# Patient Record
Sex: FEMALE | Race: WHITE | Employment: OTHER | URBAN - METROPOLITAN AREA
[De-identification: names, ages, dates, MRNs, and addresses within clinical notes are randomized per-mention and may not be internally consistent; named-entity substitution may affect disease eponyms.]

---

## 2019-01-02 ENCOUNTER — HOSPITAL ENCOUNTER (OUTPATIENT)
Age: 64
Setting detail: SPECIMEN
Discharge: HOME | End: 2019-01-02
Payer: COMMERCIAL

## 2019-01-04 LAB — SURGICAL PATHOLOGY REPORT: NORMAL

## 2019-01-24 ENCOUNTER — OFFICE VISIT (OUTPATIENT)
Dept: FAMILY MEDICINE CLINIC | Age: 64
End: 2019-01-24
Payer: COMMERCIAL

## 2019-01-24 VITALS
DIASTOLIC BLOOD PRESSURE: 82 MMHG | SYSTOLIC BLOOD PRESSURE: 120 MMHG | WEIGHT: 162 LBS | TEMPERATURE: 99.3 F | HEART RATE: 92 BPM | HEIGHT: 67 IN | BODY MASS INDEX: 25.43 KG/M2 | RESPIRATION RATE: 20 BRPM

## 2019-01-24 DIAGNOSIS — K58.8 OTHER IRRITABLE BOWEL SYNDROME: ICD-10-CM

## 2019-01-24 DIAGNOSIS — K21.00 GASTROESOPHAGEAL REFLUX DISEASE WITH ESOPHAGITIS: Primary | ICD-10-CM

## 2019-01-24 DIAGNOSIS — F41.9 ANXIETY: ICD-10-CM

## 2019-01-24 DIAGNOSIS — R05.3 CHRONIC COUGH: ICD-10-CM

## 2019-01-24 DIAGNOSIS — I34.1 MITRAL VALVE PROLAPSE: ICD-10-CM

## 2019-01-24 DIAGNOSIS — J45.909 UNCOMPLICATED ASTHMA, UNSPECIFIED ASTHMA SEVERITY, UNSPECIFIED WHETHER PERSISTENT: ICD-10-CM

## 2019-01-24 PROBLEM — K58.9 IRRITABLE BOWEL SYNDROME: Status: ACTIVE | Noted: 2017-05-08

## 2019-01-24 PROBLEM — K21.9 GASTROESOPHAGEAL REFLUX DISEASE: Status: ACTIVE | Noted: 2017-05-08

## 2019-01-24 LAB
EXPIRATORY TIME-POST: NORMAL SEC
EXPIRATORY TIME: NORMAL SEC
FEF 25-75% %CHNG: NORMAL
FEF 25-75% %PRED-POST: NORMAL %
FEF 25-75% %PRED-PRE: NORMAL L/SEC
FEF 25-75% PRED: NORMAL L/SEC
FEF 25-75%-POST: NORMAL L/SEC
FEF 25-75%-PRE: NORMAL L/SEC
FEV1 %PRED-POST: NORMAL %
FEV1 %PRED-PRE: NORMAL %
FEV1 PRED: NORMAL L
FEV1-POST: NORMAL L
FEV1-PRE: NORMAL L
FEV1/FVC %PRED-POST: NORMAL %
FEV1/FVC %PRED-PRE: NORMAL %
FEV1/FVC PRED: NORMAL %
FEV1/FVC-POST: NORMAL %
FEV1/FVC-PRE: NORMAL %
FVC %PRED-POST: NORMAL L
FVC %PRED-PRE: NORMAL %
FVC PRED: NORMAL L
FVC-POST: NORMAL L
FVC-PRE: NORMAL L
PEF %PRED-POST: NORMAL %
PEF %PRED-PRE: NORMAL L/SEC
PEF PRED: NORMAL L/SEC
PEF%CHNG: NORMAL
PEF-POST: NORMAL L/SEC
PEF-PRE: NORMAL L/SEC

## 2019-01-24 PROCEDURE — G8484 FLU IMMUNIZE NO ADMIN: HCPCS | Performed by: NURSE PRACTITIONER

## 2019-01-24 PROCEDURE — 3017F COLORECTAL CA SCREEN DOC REV: CPT | Performed by: NURSE PRACTITIONER

## 2019-01-24 PROCEDURE — G8419 CALC BMI OUT NRM PARAM NOF/U: HCPCS | Performed by: NURSE PRACTITIONER

## 2019-01-24 PROCEDURE — 1036F TOBACCO NON-USER: CPT | Performed by: NURSE PRACTITIONER

## 2019-01-24 PROCEDURE — 99203 OFFICE O/P NEW LOW 30 MIN: CPT | Performed by: NURSE PRACTITIONER

## 2019-01-24 PROCEDURE — G8427 DOCREV CUR MEDS BY ELIG CLIN: HCPCS | Performed by: NURSE PRACTITIONER

## 2019-01-24 RX ORDER — BENZONATATE 100 MG/1
100 CAPSULE ORAL 3 TIMES DAILY PRN
COMMUNITY
End: 2019-09-25

## 2019-01-24 RX ORDER — PANTOPRAZOLE SODIUM 40 MG/1
40 TABLET, DELAYED RELEASE ORAL NIGHTLY
Qty: 30 TABLET | Refills: 0 | Status: SHIPPED | OUTPATIENT
Start: 2019-01-24 | End: 2019-02-19 | Stop reason: SDUPTHER

## 2019-01-24 RX ORDER — ALBUTEROL SULFATE 2.5 MG/3ML
SOLUTION RESPIRATORY (INHALATION) PRN
COMMUNITY
Start: 2019-01-11 | End: 2019-01-24 | Stop reason: ALTCHOICE

## 2019-01-24 RX ORDER — ZOLPIDEM TARTRATE 5 MG/1
5 TABLET ORAL PRN
COMMUNITY
Start: 2012-07-25 | End: 2019-01-24 | Stop reason: ALTCHOICE

## 2019-01-24 RX ORDER — ACYCLOVIR 50 MG/G
OINTMENT TOPICAL DAILY
COMMUNITY
Start: 2017-05-08 | End: 2019-01-24 | Stop reason: ALTCHOICE

## 2019-01-24 RX ORDER — RANITIDINE 150 MG/1
150 TABLET ORAL 3 TIMES DAILY PRN
COMMUNITY
Start: 2018-11-02 | End: 2019-01-24 | Stop reason: ALTCHOICE

## 2019-01-24 RX ORDER — OMEPRAZOLE 20 MG/1
20 CAPSULE, DELAYED RELEASE ORAL DAILY
COMMUNITY
Start: 2018-12-26 | End: 2019-01-24 | Stop reason: ALTCHOICE

## 2019-01-24 ASSESSMENT — PATIENT HEALTH QUESTIONNAIRE - PHQ9
SUM OF ALL RESPONSES TO PHQ QUESTIONS 1-9: 1
1. LITTLE INTEREST OR PLEASURE IN DOING THINGS: 0
SUM OF ALL RESPONSES TO PHQ QUESTIONS 1-9: 1
2. FEELING DOWN, DEPRESSED OR HOPELESS: 1
SUM OF ALL RESPONSES TO PHQ9 QUESTIONS 1 & 2: 1

## 2019-01-24 ASSESSMENT — ENCOUNTER SYMPTOMS
COUGH: 1
ABDOMINAL PAIN: 1
RHINORRHEA: 0
EYE REDNESS: 0
SORE THROAT: 0
EYE ITCHING: 0
DIARRHEA: 0
SHORTNESS OF BREATH: 1
CONSTIPATION: 0
NAUSEA: 1
EYE DISCHARGE: 0

## 2019-03-22 ENCOUNTER — TELEPHONE (OUTPATIENT)
Dept: FAMILY MEDICINE CLINIC | Age: 64
End: 2019-03-22

## 2019-03-22 DIAGNOSIS — B37.0 THRUSH: Primary | ICD-10-CM

## 2019-03-22 RX ORDER — CLOTRIMAZOLE 10 MG/1
10 LOZENGE ORAL; TOPICAL
Qty: 50 TABLET | Refills: 0 | Status: SHIPPED | OUTPATIENT
Start: 2019-03-22 | End: 2019-04-01

## 2019-03-22 NOTE — TELEPHONE ENCOUNTER
Patient notified and verbalized understanding. Patient states that she did go to the [facility] clinic in [facility] to be evaluated as well. She states that they did prescribe something for her to take.

## 2019-03-22 NOTE — TELEPHONE ENCOUNTER
I can send some anti fungal to see if this relieves issues. Make sure she rinsing mouth out after steroid is taken. If issues do not resolve, let us know.  -SR

## 2019-03-22 NOTE — TELEPHONE ENCOUNTER
Patient called stating she believes she has thrush due to newly prescribed steroids. On day 4 of steroid medication prescribed by pulmonary specialist Dr. Venkat Dotson. Cannot get in with pulmonary. Patient is experiencing burning/fire in her throat, which is keeping her up at night. She has tried cough drops with no relief. Wondering if antifungal can be prescribed without being seen. No same day appointments available today. Suggested walk in clinic with worsening symptoms.

## 2019-12-11 ENCOUNTER — HOSPITAL ENCOUNTER (EMERGENCY)
Age: 64
Discharge: LEFT BEFORE DISCHARGE | End: 2019-12-11
Attending: EMERGENCY MEDICINE
Payer: COMMERCIAL

## 2019-12-14 ENCOUNTER — HOSPITAL ENCOUNTER (OUTPATIENT)
Age: 64
Setting detail: SPECIMEN
Discharge: HOME | End: 2019-12-14
Payer: COMMERCIAL

## 2019-12-14 ENCOUNTER — PATIENT MESSAGE (OUTPATIENT)
Dept: FAMILY MEDICINE CLINIC | Age: 64
End: 2019-12-14

## 2019-12-14 ENCOUNTER — OFFICE VISIT (OUTPATIENT)
Dept: FAMILY MEDICINE CLINIC | Age: 64
End: 2019-12-14
Payer: COMMERCIAL

## 2019-12-14 VITALS
BODY MASS INDEX: 24.43 KG/M2 | WEIGHT: 156 LBS | TEMPERATURE: 98.4 F | OXYGEN SATURATION: 98 % | DIASTOLIC BLOOD PRESSURE: 72 MMHG | SYSTOLIC BLOOD PRESSURE: 102 MMHG | HEART RATE: 102 BPM | RESPIRATION RATE: 18 BRPM

## 2019-12-14 DIAGNOSIS — R11.0 NAUSEA: ICD-10-CM

## 2019-12-14 DIAGNOSIS — I34.1 MITRAL VALVE PROLAPSE: ICD-10-CM

## 2019-12-14 DIAGNOSIS — F41.0 PANIC DISORDER: ICD-10-CM

## 2019-12-14 DIAGNOSIS — K58.0 IRRITABLE BOWEL SYNDROME WITH DIARRHEA: ICD-10-CM

## 2019-12-14 DIAGNOSIS — F41.9 ANXIETY: ICD-10-CM

## 2019-12-14 DIAGNOSIS — R10.11 RIGHT UPPER QUADRANT ABDOMINAL PAIN: ICD-10-CM

## 2019-12-14 DIAGNOSIS — Z79.2 PROPHYLACTIC ANTIBIOTIC: ICD-10-CM

## 2019-12-14 DIAGNOSIS — R73.01 ELEVATED FASTING BLOOD SUGAR: ICD-10-CM

## 2019-12-14 DIAGNOSIS — K21.9 GASTROESOPHAGEAL REFLUX DISEASE WITHOUT ESOPHAGITIS: ICD-10-CM

## 2019-12-14 DIAGNOSIS — F40.00 AGORAPHOBIA: ICD-10-CM

## 2019-12-14 DIAGNOSIS — F41.0 PANIC DISORDER: Primary | ICD-10-CM

## 2019-12-14 LAB
ABSOLUTE EOS #: <0.03 K/UL (ref 0–0.44)
ABSOLUTE IMMATURE GRANULOCYTE: 0.03 K/UL (ref 0–0.3)
ABSOLUTE LYMPH #: 1.35 K/UL (ref 1.1–3.7)
ABSOLUTE MONO #: 0.57 K/UL (ref 0.1–1.2)
ALBUMIN SERPL-MCNC: 4.6 G/DL (ref 3.5–5.2)
ALBUMIN/GLOBULIN RATIO: 1.4 (ref 1–2.5)
ALP BLD-CCNC: 84 U/L (ref 35–104)
ALT SERPL-CCNC: 15 U/L (ref 5–33)
AMYLASE: 75 U/L (ref 28–100)
ANION GAP SERPL CALCULATED.3IONS-SCNC: 18 MMOL/L (ref 9–17)
AST SERPL-CCNC: 23 U/L
BASOPHILS # BLD: 0 % (ref 0–2)
BASOPHILS ABSOLUTE: 0.04 K/UL (ref 0–0.2)
BILIRUB SERPL-MCNC: 0.69 MG/DL (ref 0.3–1.2)
BUN BLDV-MCNC: 16 MG/DL (ref 8–23)
BUN/CREAT BLD: ABNORMAL (ref 9–20)
CALCIUM SERPL-MCNC: 9.4 MG/DL (ref 8.6–10.4)
CHLORIDE BLD-SCNC: 99 MMOL/L (ref 98–107)
CHOLESTEROL, FASTING: 231 MG/DL
CHOLESTEROL/HDL RATIO: 3.6
CO2: 24 MMOL/L (ref 20–31)
CREAT SERPL-MCNC: 0.72 MG/DL (ref 0.5–0.9)
DIFFERENTIAL TYPE: ABNORMAL
EOSINOPHILS RELATIVE PERCENT: 0 % (ref 1–4)
GFR AFRICAN AMERICAN: >60 ML/MIN
GFR NON-AFRICAN AMERICAN: >60 ML/MIN
GFR SERPL CREATININE-BSD FRML MDRD: ABNORMAL ML/MIN/{1.73_M2}
GFR SERPL CREATININE-BSD FRML MDRD: ABNORMAL ML/MIN/{1.73_M2}
GLUCOSE FASTING: 66 MG/DL (ref 70–99)
HCT VFR BLD CALC: 46.5 % (ref 36.3–47.1)
HDLC SERPL-MCNC: 64 MG/DL
HEMOGLOBIN: 15.3 G/DL (ref 11.9–15.1)
IMMATURE GRANULOCYTES: 0 %
LDL CHOLESTEROL: 152 MG/DL (ref 0–130)
LIPASE: 19 U/L (ref 13–60)
LYMPHOCYTES # BLD: 12 % (ref 24–43)
MCH RBC QN AUTO: 30.9 PG (ref 25.2–33.5)
MCHC RBC AUTO-ENTMCNC: 32.9 G/DL (ref 28.4–34.8)
MCV RBC AUTO: 93.9 FL (ref 82.6–102.9)
MONOCYTES # BLD: 5 % (ref 3–12)
NRBC AUTOMATED: 0 PER 100 WBC
PDW BLD-RTO: 12.1 % (ref 11.8–14.4)
PLATELET # BLD: 322 K/UL (ref 138–453)
PLATELET ESTIMATE: ABNORMAL
PMV BLD AUTO: 12.5 FL (ref 8.1–13.5)
POTASSIUM SERPL-SCNC: 4.5 MMOL/L (ref 3.7–5.3)
RBC # BLD: 4.95 M/UL (ref 3.95–5.11)
RBC # BLD: ABNORMAL 10*6/UL
SEG NEUTROPHILS: 83 % (ref 36–65)
SEGMENTED NEUTROPHILS ABSOLUTE COUNT: 9.09 K/UL (ref 1.5–8.1)
SODIUM BLD-SCNC: 141 MMOL/L (ref 135–144)
TOTAL PROTEIN: 8 G/DL (ref 6.4–8.3)
TRIGLYCERIDE, FASTING: 76 MG/DL
TSH SERPL DL<=0.05 MIU/L-ACNC: 0.83 MIU/L (ref 0.3–5)
VITAMIN D 25-HYDROXY: 28.8 NG/ML (ref 30–100)
VLDLC SERPL CALC-MCNC: ABNORMAL MG/DL (ref 1–30)
WBC # BLD: 11.1 K/UL (ref 3.5–11.3)
WBC # BLD: ABNORMAL 10*3/UL

## 2019-12-14 PROCEDURE — G8420 CALC BMI NORM PARAMETERS: HCPCS | Performed by: NURSE PRACTITIONER

## 2019-12-14 PROCEDURE — 99214 OFFICE O/P EST MOD 30 MIN: CPT | Performed by: NURSE PRACTITIONER

## 2019-12-14 PROCEDURE — G8484 FLU IMMUNIZE NO ADMIN: HCPCS | Performed by: NURSE PRACTITIONER

## 2019-12-14 PROCEDURE — 1036F TOBACCO NON-USER: CPT | Performed by: NURSE PRACTITIONER

## 2019-12-14 PROCEDURE — G8427 DOCREV CUR MEDS BY ELIG CLIN: HCPCS | Performed by: NURSE PRACTITIONER

## 2019-12-14 PROCEDURE — 3017F COLORECTAL CA SCREEN DOC REV: CPT | Performed by: NURSE PRACTITIONER

## 2019-12-14 RX ORDER — FAMOTIDINE 20 MG
TABLET ORAL
Refills: 0 | COMMUNITY
Start: 2019-12-12 | End: 2020-09-28 | Stop reason: ALTCHOICE

## 2019-12-14 RX ORDER — OMEPRAZOLE 20 MG/1
20 CAPSULE, DELAYED RELEASE ORAL DAILY
COMMUNITY
Start: 2019-11-14 | End: 2020-02-20 | Stop reason: SDUPTHER

## 2019-12-14 RX ORDER — CLONAZEPAM 0.5 MG/1
.5-1 TABLET ORAL 2 TIMES DAILY PRN
Qty: 60 TABLET | Refills: 0 | Status: SHIPPED | OUTPATIENT
Start: 2019-12-14 | End: 2021-09-08 | Stop reason: SDUPTHER

## 2019-12-14 RX ORDER — AMOXICILLIN AND CLAVULANATE POTASSIUM 875; 125 MG/1; MG/1
1 TABLET, FILM COATED ORAL 2 TIMES DAILY
Qty: 6 TABLET | Refills: 3 | Status: SHIPPED | OUTPATIENT
Start: 2019-12-14 | End: 2019-12-26

## 2019-12-14 RX ORDER — FLUCONAZOLE 100 MG/1
100 TABLET ORAL DAILY
COMMUNITY
Start: 2019-11-25 | End: 2020-09-28 | Stop reason: ALTCHOICE

## 2019-12-14 ASSESSMENT — ENCOUNTER SYMPTOMS
CONSTIPATION: 0
EYE ITCHING: 0
EYE REDNESS: 0
SHORTNESS OF BREATH: 1
EYE DISCHARGE: 0
COUGH: 1
ABDOMINAL PAIN: 1
DIARRHEA: 0
RHINORRHEA: 0
SORE THROAT: 0
NAUSEA: 1

## 2019-12-15 LAB
BILIRUBIN URINE: NEGATIVE
COLOR: YELLOW
COMMENT UA: NORMAL
CULTURE: NORMAL
GLUCOSE URINE: NEGATIVE
KETONES, URINE: NEGATIVE
LEUKOCYTE ESTERASE, URINE: NEGATIVE
Lab: NORMAL
NITRITE, URINE: NEGATIVE
PH UA: 5 (ref 5–8)
PROTEIN UA: NEGATIVE
SPECIFIC GRAVITY UA: 1.02 (ref 1–1.03)
SPECIMEN DESCRIPTION: NORMAL
TURBIDITY: CLEAR
URINE HGB: NEGATIVE
UROBILINOGEN, URINE: NORMAL

## 2019-12-16 LAB
ESTIMATED AVERAGE GLUCOSE: 91 MG/DL
HBA1C MFR BLD: 4.8 % (ref 4–6)

## 2020-02-20 RX ORDER — OMEPRAZOLE 20 MG/1
20 CAPSULE, DELAYED RELEASE ORAL DAILY
Qty: 90 CAPSULE | Refills: 1 | Status: SHIPPED | OUTPATIENT
Start: 2020-02-20 | End: 2020-04-24 | Stop reason: SDUPTHER

## 2020-02-20 RX ORDER — PANTOPRAZOLE SODIUM 40 MG/1
40 TABLET, DELAYED RELEASE ORAL NIGHTLY
Qty: 30 TABLET | Refills: 0 | Status: SHIPPED | OUTPATIENT
Start: 2020-02-20 | End: 2020-09-28 | Stop reason: ALTCHOICE

## 2020-04-24 NOTE — TELEPHONE ENCOUNTER
Last visit: 1/24/19  Last Med refill: 2/20/20  Does patient have enough medication for 72 hours: Yes-patient's insurance changed and she now has to use [email or web address]. [facility] will be due for refill may 1st but they need it on file in order to process for a fill. Next Visit Date:  No future appointments.     Health Maintenance   Topic Date Due    DTaP/Tdap/Td vaccine (1 - Tdap) 03/09/1974    Cervical cancer screen  03/09/1976    Breast cancer screen  03/09/2005    Colon cancer screen colonoscopy  03/09/2005    DEXA (modify frequency per FRAX score)  03/09/2010    Pneumococcal 65+ years Vaccine (1 of 1 - PPSV23) 03/09/2020    Flu vaccine (Season Ended) 12/14/2020 (Originally 9/1/2020)    Shingles Vaccine (1 of 2) 12/14/2020 (Originally 3/9/2005)    Lipid screen  12/14/2024    Hepatitis A vaccine  Aged Out    Hepatitis B vaccine  Aged Out    Hib vaccine  Aged Out    Meningococcal (ACWY) vaccine  Aged Out    Hepatitis C screen  Discontinued    HIV screen  Discontinued       Hemoglobin A1C (%)   Date Value   12/14/2019 4.8             ( goal A1C is < 7)   No results found for: LABMICR  LDL Cholesterol (mg/dL)   Date Value   12/14/2019 152 (H)       (goal LDL is <100)   AST (U/L)   Date Value   12/14/2019 23     ALT (U/L)   Date Value   12/14/2019 15     BUN (mg/dL)   Date Value   12/14/2019 16     BP Readings from Last 3 Encounters:   12/14/19 102/72   01/24/19 120/82          (goal 120/80)    All Future Testing planned in [facility]              Patient Active Problem List:     Anxiety     Gastroesophageal reflux disease     Irritable bowel syndrome     Mitral valve prolapse     Asthma     Agoraphobia     Panic disorder     Prophylactic antibiotic

## 2020-04-27 RX ORDER — OMEPRAZOLE 20 MG/1
20 CAPSULE, DELAYED RELEASE ORAL DAILY
Qty: 90 CAPSULE | Refills: 1 | Status: SHIPPED | OUTPATIENT
Start: 2020-04-27 | End: 2020-11-15 | Stop reason: SDUPTHER

## 2020-09-28 ENCOUNTER — OFFICE VISIT (OUTPATIENT)
Dept: FAMILY MEDICINE CLINIC | Age: 65
End: 2020-09-28
Payer: MEDICARE

## 2020-09-28 VITALS
OXYGEN SATURATION: 98 % | RESPIRATION RATE: 20 BRPM | BODY MASS INDEX: 25.72 KG/M2 | SYSTOLIC BLOOD PRESSURE: 104 MMHG | DIASTOLIC BLOOD PRESSURE: 74 MMHG | WEIGHT: 164.2 LBS | TEMPERATURE: 97.9 F | HEART RATE: 84 BPM

## 2020-09-28 PROCEDURE — 4040F PNEUMOC VAC/ADMIN/RCVD: CPT | Performed by: NURSE PRACTITIONER

## 2020-09-28 PROCEDURE — 3017F COLORECTAL CA SCREEN DOC REV: CPT | Performed by: NURSE PRACTITIONER

## 2020-09-28 PROCEDURE — G8417 CALC BMI ABV UP PARAM F/U: HCPCS | Performed by: NURSE PRACTITIONER

## 2020-09-28 PROCEDURE — 99214 OFFICE O/P EST MOD 30 MIN: CPT | Performed by: NURSE PRACTITIONER

## 2020-09-28 PROCEDURE — G8400 PT W/DXA NO RESULTS DOC: HCPCS | Performed by: NURSE PRACTITIONER

## 2020-09-28 PROCEDURE — G8427 DOCREV CUR MEDS BY ELIG CLIN: HCPCS | Performed by: NURSE PRACTITIONER

## 2020-09-28 PROCEDURE — 1036F TOBACCO NON-USER: CPT | Performed by: NURSE PRACTITIONER

## 2020-09-28 PROCEDURE — 81003 URINALYSIS AUTO W/O SCOPE: CPT | Performed by: NURSE PRACTITIONER

## 2020-09-28 PROCEDURE — 1123F ACP DISCUSS/DSCN MKR DOCD: CPT | Performed by: NURSE PRACTITIONER

## 2020-09-28 PROCEDURE — 90471 IMMUNIZATION ADMIN: CPT | Performed by: NURSE PRACTITIONER

## 2020-09-28 PROCEDURE — 1090F PRES/ABSN URINE INCON ASSESS: CPT | Performed by: NURSE PRACTITIONER

## 2020-09-28 PROCEDURE — 90715 TDAP VACCINE 7 YRS/> IM: CPT | Performed by: NURSE PRACTITIONER

## 2020-09-28 RX ORDER — ZOLPIDEM TARTRATE 5 MG/1
5 TABLET ORAL PRN
COMMUNITY
End: 2021-07-16 | Stop reason: SDUPTHER

## 2020-09-28 RX ORDER — GREEN TEA/HOODIA GORDONII 315-12.5MG
1 CAPSULE ORAL DAILY
COMMUNITY

## 2020-09-28 RX ORDER — BENZONATATE 100 MG/1
CAPSULE ORAL PRN
COMMUNITY
End: 2022-07-24 | Stop reason: ALTCHOICE

## 2020-09-28 ASSESSMENT — ENCOUNTER SYMPTOMS
WHEEZING: 0
COUGH: 0
DIARRHEA: 1
ABDOMINAL PAIN: 0
SHORTNESS OF BREATH: 0
EYES NEGATIVE: 1
CONSTIPATION: 0
BACK PAIN: 0
CHEST TIGHTNESS: 0
TROUBLE SWALLOWING: 0
SINUS PRESSURE: 0
NAUSEA: 0
RHINORRHEA: 0
BLOOD IN STOOL: 0
SORE THROAT: 0

## 2020-09-28 ASSESSMENT — PATIENT HEALTH QUESTIONNAIRE - PHQ9
2. FEELING DOWN, DEPRESSED OR HOPELESS: 0
SUM OF ALL RESPONSES TO PHQ9 QUESTIONS 1 & 2: 0
1. LITTLE INTEREST OR PLEASURE IN DOING THINGS: 0
SUM OF ALL RESPONSES TO PHQ QUESTIONS 1-9: 0
SUM OF ALL RESPONSES TO PHQ QUESTIONS 1-9: 0

## 2020-09-28 ASSESSMENT — VISUAL ACUITY: OU: 1

## 2020-09-28 NOTE — PROGRESS NOTES
[address] [facility]   [address] [address]  [phone]    9/28/2020    Visit Information    Have you changed or started any medications since your last visit including any over-the-counter medicines, vitamins, or herbal medicines? yes - Med list updated   Are you having any side effects from any of your medications? -  no  Have you stopped taking any of your medications? Is so, why? -  yes - Med list updated    Have you seen any other physician or provider since your last visit? No  Have you had any other diagnostic tests since your last visit? No  Have you been seen in the emergency room and/or had an admission to a hospital since we last saw you? No  Have you had your routine dental cleaning in the past 6 months? yes -      Have you activated your [email or web address] account? If not, what are your barriers? Yes     Patient Care Team:  Verlinda Runner, APRN - CNP as PCP - General (Nurse Practitioner Family)  Verlinda Runner, APRN - CNP as PCP - [facility] Empaneled Provider      Dimitry Thomson is a 72 y.o. female who presents today for her  medical conditions/complaints as noted below. Dimitry Thomson is c/o of Anxiety; Panic Attack; Mitral Valve Prolapse; and Irritable Bowel Syndrome  . HPI:     Is a very pleasant 79-year-old  female. She presents the office today to follow-up regarding her care. Patient is being treated for anxiety as well as panic attacks. Patient goes on a great detail to tell me about her living situation. [place] the facility/building next to her holds recreational activities such as wetting receptions every weekend. The constant noise as well as constant strangers significantly worsens her anxiety and her panic attacks. Patient also has been being treated for IBS. Patient states when her anxiety is incline it does increase her loose bowel movements. Patient does take Klonopin as needed for her increased anxiety. She also has been prescribed in the past Ambien to help her sleep. I did discuss with patient in great detail that I would prefer her not to take both of these. Patient states she is willing to just do the Klonopin but instead of taking half she will take a whole and we will remove Ambien moving forward. Patient states she is never even taken an entire full dose of the Klonopin in the past.  Patient also does follow with gynecology. She has not had a cervical exam in 3+ years. Patient states she has known prolapsed bladder. I did encourage her to get another pelvic examination. I did offer that we could do within our facility, however if I did notice any type of prolapse I would send her back to her gynecologist as we do not do pessary or any type of interventions within our office. Patient also has a history of mitral valve prolapse. She does not follow with cardiology and this is simply being monitored. Patient is denying most of her health maintenance. She is willing to get a tetanus vaccination today. She has declined influenza and pneumonia. She has also declining mammograms or DEXA scans. She states if it is her time is her time. Vitals:    20 1235   BP: 104/74   Site: Left Upper Arm   Position: Sitting   Cuff Size: Medium Adult   Pulse: 84   Resp: 20   Temp: 97.9 °F (36.6 °C)   TempSrc: Temporal   SpO2: 98%   Weight: 164 lb 3.2 oz (74.5 kg)      Past Medical History:   Diagnosis Date    Acid reflux     Agoraphobia     Asthma     Herpes     Irritable bowel syndrome     Mitral valve prolapse       Past Surgical History:   Procedure Laterality Date    BLADDER REPAIR       SECTION      CHOLECYSTECTOMY       History reviewed. No pertinent family history.   Social History     Tobacco Use    Smoking status: Never Smoker    Smokeless tobacco: Never Used   Substance Use Topics    Alcohol use: No      Current Outpatient Medications   Medication Sig Dispense Refill    benzonatate (TESSALON PERLES) 100 MG capsule as needed      zolpidem (AMBIEN) 5 MG tablet Take 5 mg by mouth as needed.  Probiotic Acidophilus (FLORANEX) TABS Take 1 tablet by mouth daily      omeprazole (PRILOSEC) 20 MG delayed release capsule Take 1 capsule by mouth daily 90 capsule 1    clonazePAM (KLONOPIN) 0.5 MG tablet Take 1-2 tablets by mouth 2 times daily as needed for Anxiety (panic) for up to 15 days. 60 tablet 0     No current facility-administered medications for this visit. Allergies   Allergen Reactions    Sulfa Antibiotics      Unsure of reaction    Robitussin (Alcohol Free) [Guaifenesin] Hives and Rash       Health Maintenance   Topic Date Due    Cervical cancer screen  03/09/1976    Breast cancer screen  03/09/2005    Colon cancer screen colonoscopy  03/09/2005    DEXA (modify frequency per FRAX score)  03/09/2010    Pneumococcal 65+ years Vaccine (1 of 1 - PPSV23) 03/09/2020    Flu vaccine (1) 09/01/2020    Annual Wellness Visit (AWV)  09/02/2020    Shingles Vaccine (1 of 2) 12/14/2020 (Originally 3/9/2005)    Lipid screen  12/14/2024    DTaP/Tdap/Td vaccine (2 - Td) 09/28/2030    Hepatitis A vaccine  Aged Out    Hepatitis B vaccine  Aged Out    Hib vaccine  Aged Out    Meningococcal (ACWY) vaccine  Aged Out    Hepatitis C screen  Discontinued    HIV screen  Discontinued       Subjective:      Review of Systems   Constitutional: Negative for activity change, appetite change, chills, fatigue and fever. HENT: Negative for congestion, ear pain, postnasal drip, rhinorrhea, sinus pressure, sneezing, sore throat and trouble swallowing. Eyes: Negative. Respiratory: Negative for cough, chest tightness, shortness of breath and wheezing. Cardiovascular: Negative for chest pain and leg swelling. Gastrointestinal: Positive for diarrhea. Negative for abdominal pain, blood in stool, constipation and nausea. Known IBS    Endocrine: Negative.     Genitourinary: equal, round, and reactive to light. Neck:      Musculoskeletal: Full passive range of motion without pain and normal range of motion. No neck rigidity or pain with movement. Cardiovascular:      Rate and Rhythm: Normal rate and regular rhythm. No extrasystoles are present. Pulses: Normal pulses. Radial pulses are 2+ on the right side and 2+ on the left side. Dorsalis pedis pulses are 2+ on the right side and 2+ on the left side. Heart sounds: Normal heart sounds, S1 normal and S2 normal. No murmur. Pulmonary:      Effort: Pulmonary effort is normal. No accessory muscle usage, prolonged expiration or respiratory distress. Breath sounds: Normal breath sounds and air entry. Abdominal:      General: There is no distension. Palpations: Abdomen is soft. Tenderness: There is no abdominal tenderness. Musculoskeletal: Normal range of motion. Right lower leg: No edema. Left lower leg: No edema. Comments: Full active and passive range of motion. Lymphadenopathy:      Cervical: No cervical adenopathy. Skin:     General: Skin is warm and dry. Neurological:      General: No focal deficit present. Mental Status: She is alert and oriented to person, place, and time. Motor: Motor function is intact. Coordination: Coordination is intact. Gait: Gait is intact. Psychiatric:         Attention and Perception: Attention and perception normal.         Mood and Affect: Mood and affect normal.         Speech: Speech normal.         Behavior: Behavior normal. Behavior is cooperative. Thought Content: Thought content normal.         Cognition and Memory: Cognition and memory normal.         Judgment: Judgment normal.          Assessment:      1. Panic disorder  -Stable, medicated, monitered  May continue his Klonopin as prescribed. 2. Agoraphobia  -Stable, medicated, monitered     3.  Uncomplicated asthma, unspecified asthma severity, unspecified whether persistent  -Stable, monitered     4. Gastroesophageal reflux disease without esophagitis  - CBC; Future    5. Screening for cardiovascular condition  - CBC; Future  - Lipid Panel; Future  - Urinalysis; Future    6. Screening for diabetes mellitus (DM)  - Comprehensive Metabolic Panel, Fasting; Future    7. Need for tetanus, diphtheria, and acellular pertussis (Tdap) vaccine  - Tdap (age 6y and older) IM ([address])       Plan:      Return in about 1 year (around 9/28/2021) for Follow up as needed. Orders Placed This Encounter   Procedures    Tdap (age 6y and older) IM ([address])    CBC     Standing Status:   Future     Standing Expiration Date:   9/28/2021    Comprehensive Metabolic Panel, Fasting     Standing Status:   Future     Standing Expiration Date:   9/28/2021    Lipid Panel     Standing Status:   Future     Standing Expiration Date:   9/28/2021     Order Specific Question:   Is Patient Fasting?/# of Hours     Answer:   yes    Urinalysis     Standing Status:   Future     Standing Expiration Date:   9/28/2021     Orders Placed This Encounter   Medications    DISCONTD: Tetanus-Diphth-Acell Pertussis (BOOSTRIX) injection 0.5 mL       Reviewed health maintenance, prior labs and imaging. Patient given educational materials - see patient instructions. Discussed use, benefit, and side effects of prescribed medications. Barriers to medication compliance addressed. All patient questions answered. Pt voiced understanding to plan of care. Instructed to continue medications as discussed, healthy diet and exercise. Patient agreed with treatment plan. Follow up as directed below. Communication:      Items for Patient/Writer/Staff to Accomplish  To complete order blood work. Patient to follow-up as needed. I strongly encouraged her to look into and potentially decide yes to any health maintenance discussed at next ointment.   Patient is to continue taking the Klonopin for her anxiety. Strongly encouraged to stop taking Ambien as I would not prescribe this moving forward.     Quality Measures  BMI Readings from Last 1 Encounters:   09/28/20 25.72 kg/m²        Lab Results   Component Value Date    LABA1C 4.8 12/14/2019       BP Readings from Last 3 Encounters:   09/28/20 104/74   12/14/19 102/72   01/24/19 120/82        The 10-year ASCVD risk score (Mague Gill, et al., 2013) is: 3.8%    Values used to calculate the score:      Age: 72 years      Sex: Female      Is Non- : No      Diabetic: No      Tobacco smoker: No      Systolic Blood Pressure: 705 mmHg      Is BP treated: No      HDL Cholesterol: 64 mg/dL      Total Cholesterol: 231 mg/dL     PHQ Scores 9/28/2020 1/24/2019   PHQ2 Score 0 1   PHQ9 Score 0 1     Interpretation of Total Score Depression Severity: 1-4 = Minimal depression, 5-9 = Mild depression, 10-14 = Moderate depression, 15-19 = Moderately severe depression, 20-27 = Severe depression     Electronically signed by SIERRA Mcneill on 9/28/2020 at 1:49 PM

## 2020-11-16 RX ORDER — OMEPRAZOLE 20 MG/1
20 CAPSULE, DELAYED RELEASE ORAL DAILY
Qty: 90 CAPSULE | Refills: 1 | Status: SHIPPED | OUTPATIENT
Start: 2020-11-16 | End: 2021-02-10 | Stop reason: SDUPTHER

## 2020-11-16 NOTE — TELEPHONE ENCOUNTER
LOV 9/28/20  RTO 1 year  LRF 4/27/20    Health Maintenance   Topic Date Due    Cervical cancer screen  03/09/1976    Breast cancer screen  03/09/2005    Colon cancer screen colonoscopy  03/09/2005    DEXA (modify frequency per FRAX score)  03/09/2010    Pneumococcal 65+ years Vaccine (1 of 1 - PPSV23) 03/09/2020    Flu vaccine (1) 09/01/2020    Annual Wellness Visit (AWV)  09/02/2020    Shingles Vaccine (1 of 2) 12/14/2020 (Originally 3/9/2005)    Lipid screen  12/14/2024    DTaP/Tdap/Td vaccine (2 - Td) 09/28/2030    Hepatitis A vaccine  Aged Out    Hepatitis B vaccine  Aged Out    Hib vaccine  Aged Out    Meningococcal (ACWY) vaccine  Aged Out    Hepatitis C screen  Discontinued    HIV screen  Discontinued             (applicable per patient's age: Cancer Screenings, Depression Screening, Fall Risk Screening, Immunizations)    Hemoglobin A1C (%)   Date Value   12/14/2019 4.8     LDL Cholesterol (mg/dL)   Date Value   12/14/2019 152 (H)     AST (U/L)   Date Value   12/14/2019 23     ALT (U/L)   Date Value   12/14/2019 15     BUN (mg/dL)   Date Value   12/14/2019 16      (goal A1C is < 7)   (goal LDL is <100) need 30-50% reduction from baseline     BP Readings from Last 3 Encounters:   09/28/20 104/74   12/14/19 102/72   01/24/19 120/82    (goal /80)      All Future Testing planned in [facility]:  Lab Frequency Next Occurrence   CBC Once 09/28/2020   Comprehensive Metabolic Panel, Fasting Once 09/28/2020   Lipid Panel Once 09/28/2020   Urinalysis Once 09/28/2020       Next Visit Date:  No future appointments.          Patient Active Problem List:     Anxiety     Gastroesophageal reflux disease     Irritable bowel syndrome     Mitral valve prolapse     Asthma     Agoraphobia     Panic disorder     Prophylactic antibiotic

## 2020-11-19 ENCOUNTER — TELEPHONE (OUTPATIENT)
Dept: FAMILY MEDICINE CLINIC | Age: 65
End: 2020-11-19

## 2020-11-19 DIAGNOSIS — Z79.2 PROPHYLACTIC ANTIBIOTIC: Primary | ICD-10-CM

## 2020-11-19 RX ORDER — AMOXICILLIN 500 MG/1
2000 CAPSULE ORAL ONCE
Qty: 4 CAPSULE | Refills: 0 | Status: SHIPPED | OUTPATIENT
Start: 2020-11-19 | End: 2020-11-19

## 2021-02-07 DIAGNOSIS — K21.9 GASTROESOPHAGEAL REFLUX DISEASE WITHOUT ESOPHAGITIS: ICD-10-CM

## 2021-02-10 DIAGNOSIS — K21.9 GASTROESOPHAGEAL REFLUX DISEASE WITHOUT ESOPHAGITIS: ICD-10-CM

## 2021-02-10 RX ORDER — OMEPRAZOLE 20 MG/1
20 CAPSULE, DELAYED RELEASE ORAL DAILY
Qty: 90 CAPSULE | Refills: 1 | OUTPATIENT
Start: 2021-02-10 | End: 2021-08-09

## 2021-02-10 RX ORDER — OMEPRAZOLE 20 MG/1
20 CAPSULE, DELAYED RELEASE ORAL DAILY
Qty: 90 CAPSULE | Refills: 1 | Status: SHIPPED | OUTPATIENT
Start: 2021-02-10 | End: 2021-05-04 | Stop reason: SDUPTHER

## 2021-02-10 NOTE — TELEPHONE ENCOUNTER
LOV 9/28/20  RTO 1 year  [facility] 11/16/20    Health Maintenance   Topic Date Due    COVID-19 Vaccine (1 of 2) 03/09/1971    Cervical cancer screen  03/09/1976    Breast cancer screen  03/09/2005    Shingles Vaccine (1 of 2) 03/09/2005    Colon cancer screen colonoscopy  03/09/2005    DEXA (modify frequency per FRAX score)  03/09/2010    Pneumococcal 65+ years Vaccine (1 of 1 - PPSV23) 03/09/2020    Flu vaccine (1) 09/01/2020    Annual Wellness Visit (AWV)  09/02/2020    Lipid screen  12/14/2024    DTaP/Tdap/Td vaccine (2 - Td) 09/28/2030    Hepatitis A vaccine  Aged Out    Hepatitis B vaccine  Aged Out    Hib vaccine  Aged Out    Meningococcal (ACWY) vaccine  Aged Out    Hepatitis C screen  Discontinued    HIV screen  Discontinued             (applicable per patient's age: Cancer Screenings, Depression Screening, Fall Risk Screening, Immunizations)    Hemoglobin A1C (%)   Date Value   12/14/2019 4.8     LDL Cholesterol (mg/dL)   Date Value   12/14/2019 152 (H)     AST (U/L)   Date Value   12/14/2019 23     ALT (U/L)   Date Value   12/14/2019 15     BUN (mg/dL)   Date Value   12/14/2019 16      (goal A1C is < 7)   (goal LDL is <100) need 30-50% reduction from baseline     BP Readings from Last 3 Encounters:   09/28/20 104/74   12/14/19 102/72   01/24/19 120/82    (goal /80)      All Future Testing planned in [facility]:  Lab Frequency Next Occurrence   CBC Once 09/28/2020   Comprehensive Metabolic Panel, Fasting Once 09/28/2020   Lipid Panel Once 09/28/2020   Urinalysis Once 09/28/2020       Next Visit Date:  No future appointments.          Patient Active Problem List:     Anxiety     Gastroesophageal reflux disease     Irritable bowel syndrome     Mitral valve prolapse     Asthma     Agoraphobia     Panic disorder     Prophylactic antibiotic

## 2021-05-04 ENCOUNTER — OFFICE VISIT (OUTPATIENT)
Dept: PRIMARY CARE CLINIC | Age: 66
End: 2021-05-04
Payer: COMMERCIAL

## 2021-05-04 VITALS
SYSTOLIC BLOOD PRESSURE: 100 MMHG | HEART RATE: 85 BPM | DIASTOLIC BLOOD PRESSURE: 70 MMHG | BODY MASS INDEX: 25.69 KG/M2 | OXYGEN SATURATION: 98 % | WEIGHT: 164 LBS | TEMPERATURE: 98 F

## 2021-05-04 DIAGNOSIS — R31.29 OTHER MICROSCOPIC HEMATURIA: ICD-10-CM

## 2021-05-04 DIAGNOSIS — B34.9 VIRAL DISEASE: Primary | ICD-10-CM

## 2021-05-04 DIAGNOSIS — K21.9 GASTROESOPHAGEAL REFLUX DISEASE WITHOUT ESOPHAGITIS: ICD-10-CM

## 2021-05-04 DIAGNOSIS — R53.83 FATIGUE, UNSPECIFIED TYPE: ICD-10-CM

## 2021-05-04 LAB
BILIRUBIN, POC: NORMAL
BLOOD URINE, POC: NORMAL
CLARITY, POC: CLEAR
COLOR, POC: YELLOW
GLUCOSE URINE, POC: NORMAL
KETONES, POC: NORMAL
LEUKOCYTE EST, POC: NORMAL
NITRITE, POC: NORMAL
PH, POC: 5.5
PROTEIN, POC: NORMAL
SPECIFIC GRAVITY, POC: 1.02
UROBILINOGEN, POC: 0.2

## 2021-05-04 PROCEDURE — 81002 URINALYSIS NONAUTO W/O SCOPE: CPT | Performed by: FAMILY MEDICINE

## 2021-05-04 PROCEDURE — G8417 CALC BMI ABV UP PARAM F/U: HCPCS | Performed by: FAMILY MEDICINE

## 2021-05-04 PROCEDURE — 1036F TOBACCO NON-USER: CPT | Performed by: FAMILY MEDICINE

## 2021-05-04 PROCEDURE — 1123F ACP DISCUSS/DSCN MKR DOCD: CPT | Performed by: FAMILY MEDICINE

## 2021-05-04 PROCEDURE — G8400 PT W/DXA NO RESULTS DOC: HCPCS | Performed by: FAMILY MEDICINE

## 2021-05-04 PROCEDURE — 4040F PNEUMOC VAC/ADMIN/RCVD: CPT | Performed by: FAMILY MEDICINE

## 2021-05-04 PROCEDURE — G8427 DOCREV CUR MEDS BY ELIG CLIN: HCPCS | Performed by: FAMILY MEDICINE

## 2021-05-04 PROCEDURE — 99214 OFFICE O/P EST MOD 30 MIN: CPT | Performed by: FAMILY MEDICINE

## 2021-05-04 PROCEDURE — 3017F COLORECTAL CA SCREEN DOC REV: CPT | Performed by: FAMILY MEDICINE

## 2021-05-04 PROCEDURE — 1090F PRES/ABSN URINE INCON ASSESS: CPT | Performed by: FAMILY MEDICINE

## 2021-05-04 RX ORDER — NITROFURANTOIN 25; 75 MG/1; MG/1
100 CAPSULE ORAL 2 TIMES DAILY
Qty: 14 CAPSULE | Refills: 0 | Status: SHIPPED | OUTPATIENT
Start: 2021-05-04 | End: 2021-05-11

## 2021-05-04 RX ORDER — OMEPRAZOLE 20 MG/1
20 CAPSULE, DELAYED RELEASE ORAL DAILY
Qty: 90 CAPSULE | Refills: 1 | Status: SHIPPED | OUTPATIENT
Start: 2021-05-04 | End: 2021-11-29

## 2021-05-04 NOTE — TELEPHONE ENCOUNTER
Lov: 9/28/20  Lrf: 2/10/21  Na: 0  Health Maintenance   Topic Date Due    Breast cancer screen  Never done    Shingles Vaccine (1 of 2) Never done    Colon cancer screen colonoscopy  Never done    DEXA (modify frequency per FRAX score)  Never done    Pneumococcal 65+ years Vaccine (1 of 1 - PPSV23) Never done   [facility] Visit (AWV)  Never done    COVID-19 Vaccine (2 - Moderna 2-dose series) 05/05/2021    Flu vaccine (Season Ended) 09/01/2021    Lipid screen  12/14/2024    DTaP/Tdap/Td vaccine (2 - Td) 09/28/2030    Hepatitis A vaccine  Aged Out    Hepatitis B vaccine  Aged Out    Hib vaccine  Aged Out    Meningococcal (ACWY) vaccine  Aged Out    Hepatitis C screen  Discontinued             (applicable per patient's age: Cancer Screenings, Depression Screening, Fall Risk Screening, Immunizations)    Hemoglobin A1C (%)   Date Value   12/14/2019 4.8     LDL Cholesterol (mg/dL)   Date Value   12/14/2019 152 (H)     AST (U/L)   Date Value   12/14/2019 23     ALT (U/L)   Date Value   12/14/2019 15     BUN (mg/dL)   Date Value   12/14/2019 16      (goal A1C is < 7)   (goal LDL is <100) need 30-50% reduction from baseline     BP Readings from Last 3 Encounters:   05/04/21 100/70   09/28/20 104/74   12/14/19 102/72    (goal /80)      All Future Testing planned in [facility]:  Lab Frequency Next Occurrence   CBC Once 09/28/2021   Comprehensive Metabolic Panel, Fasting Once 09/28/2021   Lipid Panel Once 09/28/2021   Urinalysis Once 09/28/2021   Culture, Urine Once 05/04/2021       Next Visit Date:  No future appointments.          Patient Active Problem List:     Anxiety     Gastroesophageal reflux disease     Irritable bowel syndrome     Mitral valve prolapse     Asthma     Agoraphobia     Panic disorder     Prophylactic antibiotic

## 2021-05-04 NOTE — PROGRESS NOTES
Subjective:  Rosa Marvni presents for   Chief Complaint   Patient presents with    Fatigue     Started 2 days ago. Sleeping a lot more than usual and very tired all the time. Due for her 2nd covid shot tomorrow and she is nervous about getting it.  Other     heartbeat seems to be faster. and no appetite     No fevers or chills  Fluids are normal  Diet is less. No gi  Sx. No resp sx    Everyone around her is fine    Has  Hx of having utis' and feeling like this        Patient Active Problem List   Diagnosis    Anxiety    Gastroesophageal reflux disease    Irritable bowel syndrome    Mitral valve prolapse    Asthma    Agoraphobia    Panic disorder    Prophylactic antibiotic         Review of Systems:  · General: no significant weight changes. · Respiratory: no cough, pleuritic chest pain, dyspnea, or wheezing  · Cardiovascular: no pain, SHEEHAN, orthopnea, palpitations or claudication  · Gastrointestinal: no chronic nausea, vomiting, heartburn, diarrhea, constipation, bloating, or abdominal pain. No bloody or black stools. Objective:  Physical Exam   Vitals:   Vitals:    05/04/21 1233   BP: 100/70   Site: Left Upper Arm   Position: Sitting   Cuff Size: Medium Adult   Pulse: 85   Temp: 98 °F (36.7 °C)   SpO2: 98%   Weight: 164 lb (74.4 kg)     Wt Readings from Last 3 Encounters:   05/04/21 164 lb (74.4 kg)   09/28/20 164 lb 3.2 oz (74.5 kg)   12/14/19 156 lb (70.8 kg)     Ht Readings from Last 3 Encounters:   01/24/19 5' 7\" (1.702 m)     Body mass index is 25.69 kg/m². Constitutional: She is oriented to person, place, and time. She appears well-developed and well-nourished and in no acute distress. Answers all my questions appropriately. Head: Normocephalic and atraumatic. Eyes:conjunctiva appear normal.  Right Ear: External ear normal. TM is clear  Left Ear: External ear normal. TM is clear  Nose: pink, non-edematous mucosa. No polyps.   No septal deviation  Throat: no erythema, tonsillar

## 2021-06-14 ENCOUNTER — OFFICE VISIT (OUTPATIENT)
Dept: PRIMARY CARE CLINIC | Age: 66
End: 2021-06-14
Payer: MEDICARE

## 2021-06-14 ENCOUNTER — HOSPITAL ENCOUNTER (OUTPATIENT)
Age: 66
Setting detail: SPECIMEN
Discharge: HOME | End: 2021-06-14
Payer: MEDICARE

## 2021-06-14 VITALS
OXYGEN SATURATION: 98 % | SYSTOLIC BLOOD PRESSURE: 116 MMHG | TEMPERATURE: 98 F | HEART RATE: 72 BPM | BODY MASS INDEX: 24.9 KG/M2 | DIASTOLIC BLOOD PRESSURE: 76 MMHG | WEIGHT: 159 LBS

## 2021-06-14 DIAGNOSIS — N89.8 VAGINAL IRRITATION: ICD-10-CM

## 2021-06-14 DIAGNOSIS — R30.0 DYSURIA: ICD-10-CM

## 2021-06-14 DIAGNOSIS — R30.0 BURNING WITH URINATION: ICD-10-CM

## 2021-06-14 DIAGNOSIS — N89.8 VAGINAL IRRITATION: Primary | ICD-10-CM

## 2021-06-14 DIAGNOSIS — R35.0 INCREASED FREQUENCY OF URINATION: ICD-10-CM

## 2021-06-14 LAB
BILIRUBIN, POC: ABNORMAL
BLOOD URINE, POC: ABNORMAL
CLARITY, POC: CLEAR
COLOR, POC: YELLOW
GLUCOSE URINE, POC: ABNORMAL
KETONES, POC: 15
LEUKOCYTE EST, POC: ABNORMAL
NITRITE, POC: ABNORMAL
PH, POC: 5
PROTEIN, POC: ABNORMAL
SPECIFIC GRAVITY, POC: 1.03
UROBILINOGEN, POC: 0.2

## 2021-06-14 PROCEDURE — 81003 URINALYSIS AUTO W/O SCOPE: CPT | Performed by: PHYSICIAN ASSISTANT

## 2021-06-14 PROCEDURE — 99213 OFFICE O/P EST LOW 20 MIN: CPT | Performed by: PHYSICIAN ASSISTANT

## 2021-06-14 RX ORDER — FLUCONAZOLE 100 MG/1
100 TABLET ORAL DAILY
Qty: 3 TABLET | Refills: 0 | Status: SHIPPED | OUTPATIENT
Start: 2021-06-14 | End: 2021-06-17

## 2021-06-14 RX ORDER — CEPHALEXIN 500 MG/1
500 CAPSULE ORAL 2 TIMES DAILY
Qty: 14 CAPSULE | Refills: 0 | Status: SHIPPED | OUTPATIENT
Start: 2021-06-14 | End: 2021-06-21

## 2021-06-14 NOTE — PATIENT INSTRUCTIONS
sprays, and other feminine hygiene products that have deodorants. · After going to the bathroom, wipe from front to back. When should you call for help? Call your doctor now or seek immediate medical care if:    · Symptoms such as fever, chills, nausea, or vomiting get worse or appear for the first time.     · You have new pain in your back just below your rib cage. This is called flank pain.     · There is new blood or pus in your urine.     · You have any problems with your antibiotic medicine. Watch closely for changes in your health, and be sure to contact your doctor if:    · You are not getting better after taking an antibiotic for 2 days.     · Your symptoms go away but then come back. Where can you learn more? Go to [email or web address]://[email or web address]. org and sign in to your [employer] account. Enter Q314 in the [place] box to learn more about \"Urinary Tract Infection (UTI) in Women: Care Instructions. \"     If you do not have an account, please click on the \"Sign Up Now\" link. Current as of: June 29, 2020               Content Version: 12.8  © 2006-2021 [employer]. Care instructions adapted under license by [facility] ([facility]). If you have questions about a medical condition or this instruction, always ask your healthcare professional. [address] any warranty or liability for your use of this information. Patient Education        Vaginal Yeast Infection: Care Instructions  Overview     A vaginal yeast infection is the growth of too many yeast cells in the vagina. This is common in women of all ages. Itching, vaginal discharge and irritation, and other symptoms can bother you. But yeast infections don't often cause other health problems. Some medicines can increase your risk of getting a yeast infection. These include antibiotics, birth control pills, hormones, and steroids.  You may also be more likely to get a yeast infection if you are pregnant, have diabetes, douche, or wear tight clothes. With treatment, most yeast infections get better in 2 to 3 days. Follow-up care is a key part of your treatment and safety. Be sure to make and go to all appointments, and call your doctor if you are having problems. It's also a good idea to know your test results and keep a list of the medicines you take. How can you care for yourself at home? · Take your medicines exactly as prescribed. Call your doctor if you think you are having a problem with your medicine. · Ask your doctor about over-the-counter (OTC) medicines for yeast infections. They may cost less than prescription medicines. If you use an OTC treatment, read and follow all instructions on the label. · Don't use tampons while using a vaginal cream or suppository. The tampons can absorb the medicine. Use pads instead. · Wear loose cotton clothing. Don't wear nylon or other fabric that holds body heat and moisture close to the skin. · Try sleeping without underwear. · Don't scratch. Relieve itching with a cold pack or a cool bath. · Don't wash your vaginal area more than once a day. Use plain water or a mild, unscented soap. Air-dry the vaginal area. · Change out of wet swimsuits after swimming. · If you are using a vaginal medicine, don't have sex until you have finished your treatment. But if you do have sex, don't depend on a condom or diaphragm for birth control. The oil in some vaginal medicines weakens latex. · Don't douche. When should you call for help? Call your doctor now or seek immediate medical care if:    · You have unexpected vaginal bleeding.     · You have new or increased pain in your vagina or pelvis. Watch closely for changes in your health, and be sure to contact your doctor if:    · You have a fever.     · You are not getting better after 2 days.     · Your symptoms come back after you finish your medicines. Where can you learn more?   Go to [email or web address] org and sign in to your [employer]Feed[employer] account. Enter P797 in the [place] box to learn more about \"Vaginal Yeast Infection: Care Instructions. \"     If you do not have an account, please click on the \"Sign Up Now\" link. Current as of: July 17, 2020               Content Version: 12.8  © 4506-6793 [employer], [facility]. Care instructions adapted under license by [facility] ([facility]). If you have questions about a medical condition or this instruction, always ask your healthcare professional. [address] any warranty or liability for your use of this information.

## 2021-06-15 ENCOUNTER — TELEPHONE (OUTPATIENT)
Dept: FAMILY MEDICINE CLINIC | Age: 66
End: 2021-06-15

## 2021-06-15 LAB
DIRECT EXAM: ABNORMAL
Lab: ABNORMAL
SPECIMEN DESCRIPTION: ABNORMAL

## 2021-06-15 RX ORDER — METRONIDAZOLE 7.5 MG/G
1 GEL VAGINAL DAILY
Qty: 1 TUBE | Refills: 0 | Status: SHIPPED | OUTPATIENT
Start: 2021-06-15 | End: 2021-06-20

## 2021-06-16 LAB
CULTURE: NORMAL
Lab: NORMAL
SPECIMEN DESCRIPTION: NORMAL

## 2021-06-18 ENCOUNTER — TELEPHONE (OUTPATIENT)
Dept: FAMILY MEDICINE CLINIC | Age: 66
End: 2021-06-18

## 2021-06-18 DIAGNOSIS — N94.9 VAGINAL BURNING: Primary | ICD-10-CM

## 2021-06-18 RX ORDER — FLUCONAZOLE 150 MG/1
150 TABLET ORAL DAILY
Qty: 3 TABLET | Refills: 0 | Status: SHIPPED | OUTPATIENT
Start: 2021-06-18 | End: 2021-06-21

## 2021-06-18 ASSESSMENT — ENCOUNTER SYMPTOMS
GASTROINTESTINAL NEGATIVE: 1
RESPIRATORY NEGATIVE: 1

## 2021-06-18 NOTE — PROGRESS NOTES
[place] [address] [place]   [address] [facility]  Phone: [phone]  Fax: Alireza Jeff    Pt Name: Mian Puente  MRN: [id number]  Jermain 1955  Date of evaluation: 6/14/2021  Provider: Laura Lang PA-C     CHIEF COMPLAINT       Chief Complaint   Patient presents with    Vaginitis     Started last week. Burning vagina- thinks she has a yeast infection- took monistat.  Urinary Tract Infection     Everything \"feels raw\" Urinary frequency and doesnt have a lot of urine. Has some blood showing up in her urine and now when she did the vaginitis swab there was blood. HISTORY OF PRESENT ILLNESS  (Location/Symptom, Timing/Onset, Context/Setting, Quality, Duration, Modifying Factors, Severity.)   Mian Puente is a 77 y.o. White [1] female who presents to the office for evaluation of      Urinary Tract Infection   This is a new problem. The current episode started in the past 7 days. Associated symptoms include frequency and urgency. Pertinent negatives include no chills, flank pain or hematuria. Associated symptoms comments: Burning- raw. Nursing Notes were reviewed. REVIEW OF SYSTEMS    (2-9 systems for level 4, 10 or more for level 5)     Review of Systems   Constitutional: Negative for chills, diaphoresis, fatigue and fever. HENT: Negative. Respiratory: Negative. Cardiovascular: Negative. Gastrointestinal: Negative. Genitourinary: Positive for dysuria, frequency, urgency and vaginal pain. Negative for difficulty urinating, dyspareunia, enuresis, flank pain, genital sores, hematuria, menstrual problem and pelvic pain. Musculoskeletal: Negative. Skin: Negative. Except as noted above the remainder of the review of systems was reviewed andnegative. PAST MEDICAL HISTORY   History reviewed.     Past Medical History:   Diagnosis Date    Acid reflux     Agoraphobia     Asthma     Herpes     Irritable bowel syndrome     Mitral valve prolapse          SURGICAL HISTORY     History reviewed. Past Surgical History:   Procedure Laterality Date    BLADDER REPAIR       SECTION      CHOLECYSTECTOMY           CURRENT MEDICATIONS       Current Outpatient Medications   Medication Sig Dispense Refill    cephALEXin (KEFLEX) 500 MG capsule Take 1 capsule by mouth 2 times daily for 7 days 14 capsule 0    fluconazole (DIFLUCAN) 150 MG tablet Take 1 tablet by mouth daily for 3 days 3 tablet 0    metroNIDAZOLE (METROGEL) 0.75 % vaginal gel Place 1 Applicatorful vaginally daily for 5 days 1 Tube 0    omeprazole (PRILOSEC) 20 MG delayed release capsule Take 1 capsule by mouth daily 90 capsule 1    benzonatate (TESSALON PERLES) 100 MG capsule as needed      zolpidem (AMBIEN) 5 MG tablet Take 5 mg by mouth as needed.  Probiotic Acidophilus (FLORANEX) TABS Take 1 tablet by mouth daily      clonazePAM (KLONOPIN) 0.5 MG tablet Take 1-2 tablets by mouth 2 times daily as needed for Anxiety (panic) for up to 15 days. 60 tablet 0     No current facility-administered medications for this visit. ALLERGIES     Sulfa antibiotics and Robitussin (alcohol free) [guaifenesin]    FAMILY HISTORY     No family history on file. No family status information on file. SOCIAL HISTORY      reports that she has never smoked. She has never used smokeless tobacco. She reports that she does not drink alcohol and does not use drugs. PHYSICAL EXAM    (up to 7 for level 4, 8 or more for level 5)     Vitals:    21 1557   BP: 116/76   Site: Left Upper Arm   Position: Sitting   Cuff Size: Medium Adult   Pulse: 72   Temp: 98 °F (36.7 °C)   SpO2: 98%   Weight: 159 lb (72.1 kg)         Physical Exam  Vitals and nursing note reviewed. Constitutional:       Appearance: Normal appearance. HENT:      Head: Normocephalic and atraumatic.       Right Ear: External ear normal.      Left Ear: External ear normal. Nose: Nose normal.      Mouth/Throat:      Mouth: Mucous membranes are moist.   Eyes:      Extraocular Movements: Extraocular movements intact. Conjunctiva/sclera: Conjunctivae normal.      Pupils: Pupils are equal, round, and reactive to light. Pulmonary:      Effort: Pulmonary effort is normal.   Abdominal:      Palpations: Abdomen is soft. Skin:     General: Skin is warm and dry. Neurological:      Mental Status: She is alert and oriented to person, place, and time. DIFFERENTIAL DIAGNOSIS:     Babarmatt Faria reviewed the disposition diagnosis with the patient and or their family/guardian. I have answered their questions and given discharge instructions. They voiced understanding of these instructions and did not have anyfurther questions or complaints. PROCEDURES:  Orders Placed This Encounter   Procedures    Culture, Urine     Standing Status:   Future     Number of Occurrences:   1     Standing Expiration Date:   6/14/2022     Order Specific Question:   Specify (ex-cath, midstream, cysto, etc)?      Answer:   midstream    Vaginitis DNA Probe     Standing Status:   Future     Standing Expiration Date:   6/14/2022    POCT Urinalysis No Micro (Auto)       Results for orders placed or performed in visit on 06/14/21   POCT Urinalysis No Micro (Auto)   Result Value Ref Range    Color, UA yellow     Clarity, UA clear     Glucose, UA POC neg     Bilirubin, UA neg     Ketones, UA 15     Spec Grav, UA 1.030     Blood, UA POC moderate     pH, UA 5.0     Protein, UA POC neg     Urobilinogen, UA 0.2     Leukocytes, UA neg     Nitrite, UA neg        FINALIMPRESSION      Visit Diagnoses and Associated Orders     Vaginal irritation    -  Primary    POCT Urinalysis No Micro (Auto) [[id number] Custom]      Culture, Urine [[id number] Custom]   - Future Order    Vaginitis DNA Probe [[id number] Custom]   - Future Order         Burning with urination        POCT Urinalysis No Micro (Auto) [[id number] Custom]      Culture, Urine [[id number] Custom]   - Future Order    Vaginitis DNA Probe [[id number] Custom]   - Future Order         Dysuria        POCT Urinalysis No Micro (Auto) [[id number] Custom]      Culture, Urine [[id number] Custom]   - Future Order    Vaginitis DNA Probe [[id number] Custom]   - Future Order         Increased frequency of urination        POCT Urinalysis No Micro (Auto) [[id number] Custom]      Culture, Urine [[id number] Custom]   - Future Order    Vaginitis DNA Probe [[id number] Custom]   - Future Order         ORDERS WITHOUT AN ASSOCIATED DIAGNOSIS    fluconazole (DIFLUCAN) 100 MG tablet [[id number]]      cephALEXin (KEFLEX) 500 MG capsule [9500]              PLAN     Return if symptoms worsen or fail to improve. DISCHARGEMEDICATIONS:  Orders Placed This Encounter   Medications    fluconazole (DIFLUCAN) 100 MG tablet     Sig: Take 1 tablet by mouth daily for 3 doses     Dispense:  3 tablet     Refill:  0    cephALEXin (KEFLEX) 500 MG capsule     Sig: Take 1 capsule by mouth 2 times daily for 7 days     Dispense:  14 capsule     Refill:  0         Plan:  Specimen sent for a culture. Possible treatment alteration based on the results. Patient instructed to complete entire antibiotic course. Increase fluid intake. Educational materials regarding UTI given on AVS.  Patient agreeable to treatment plan. Follow up if symptoms do not improve/worsen. Patient instructed to return to the office if symptoms worsen, return, or have any other concerns. Patient understands and is agreeable.          Jaye Gottlieb PA-C 6/18/2021 5:54 PM

## 2021-06-18 NOTE — TELEPHONE ENCOUNTER
Pt states that she is still having sx of vaginal yeast. She has burning on the ouside of her vagina. She is using metrogel and taking keflex. Pt had previously stopped her diflucan. But, since she is having continued issues we suggest that she take her diflucan. Pt states that she threw that away and is wondering if you will send another rx to her pharm? Please advise- orders pending. Thank you.

## 2021-06-22 ENCOUNTER — TELEPHONE (OUTPATIENT)
Dept: FAMILY MEDICINE CLINIC | Age: 66
End: 2021-06-22

## 2021-06-22 DIAGNOSIS — B37.0 ORAL THRUSH: Primary | ICD-10-CM

## 2021-06-22 RX ORDER — FLUCONAZOLE 100 MG/1
100 TABLET ORAL DAILY
Qty: 12 TABLET | Refills: 0 | Status: SHIPPED | OUTPATIENT
Start: 2021-06-22 | End: 2021-07-04

## 2021-06-22 NOTE — TELEPHONE ENCOUNTER
Patient was being treated for a UTI took last dose of antibiotic yesterday and last diflucan on Sunday, has a history of thrush and vaginal yeast infections. Diflucan has not seemed to help this time and the oral thrush has not changed. Please advise. Patient is calling the pharmacy to see what she was on in the past that work well for her and will send a my chart message with that information.

## 2021-07-16 ENCOUNTER — OFFICE VISIT (OUTPATIENT)
Dept: FAMILY MEDICINE CLINIC | Age: 66
End: 2021-07-16
Payer: MEDICARE

## 2021-07-16 ENCOUNTER — HOSPITAL ENCOUNTER (OUTPATIENT)
Age: 66
Setting detail: SPECIMEN
Discharge: HOME | End: 2021-07-16
Payer: MEDICARE

## 2021-07-16 VITALS
HEART RATE: 81 BPM | WEIGHT: 162.4 LBS | DIASTOLIC BLOOD PRESSURE: 80 MMHG | OXYGEN SATURATION: 98 % | SYSTOLIC BLOOD PRESSURE: 110 MMHG | RESPIRATION RATE: 20 BRPM | TEMPERATURE: 98.1 F | BODY MASS INDEX: 25.44 KG/M2

## 2021-07-16 DIAGNOSIS — F41.9 ANXIETY: ICD-10-CM

## 2021-07-16 DIAGNOSIS — R10.9 FLANK PAIN: ICD-10-CM

## 2021-07-16 DIAGNOSIS — F40.00 AGORAPHOBIA: ICD-10-CM

## 2021-07-16 DIAGNOSIS — F51.04 PSYCHOPHYSIOLOGICAL INSOMNIA: ICD-10-CM

## 2021-07-16 DIAGNOSIS — F41.0 PANIC DISORDER: Primary | ICD-10-CM

## 2021-07-16 PROCEDURE — 81003 URINALYSIS AUTO W/O SCOPE: CPT | Performed by: NURSE PRACTITIONER

## 2021-07-16 PROCEDURE — 99215 OFFICE O/P EST HI 40 MIN: CPT | Performed by: NURSE PRACTITIONER

## 2021-07-16 RX ORDER — ZOLPIDEM TARTRATE 5 MG/1
5 TABLET ORAL NIGHTLY PRN
Qty: 30 TABLET | Refills: 0 | Status: SHIPPED | OUTPATIENT
Start: 2021-07-16 | End: 2021-08-15

## 2021-07-16 ASSESSMENT — PATIENT HEALTH QUESTIONNAIRE - PHQ9
1. LITTLE INTEREST OR PLEASURE IN DOING THINGS: 0
2. FEELING DOWN, DEPRESSED OR HOPELESS: 0
SUM OF ALL RESPONSES TO PHQ9 QUESTIONS 1 & 2: 0
SUM OF ALL RESPONSES TO PHQ QUESTIONS 1-9: 0

## 2021-07-16 NOTE — PROGRESS NOTES
[address]  [phone]    7/16/21     Patient ID: Kamron Addison is a 77 y.o. female  Established patient    Chief Complaint  Kamron Addison presents today for Flank Pain (Rt side. Intermittent. Front to back. Worse after a big meal. ), Anxiety, and Medication Check      Have you seen any other physician or provider since your last visit? Yes - Records Obtained [address] 5/04/21 and 6/14/21, OBGYN- Dr. Yumiko Cheung  Have you had any other diagnostic tests since your last visit? Yes - Records Obtained PAP, Vaginitis, Urinalysis   Have you been seen in the emergency room and/or had an admission to a hospital since we last saw you? No    ASSESSMENT/PLAN    1. Panic disorder  2. Agoraphobia  3. Anxiety  4. Psychophysiological insomnia  -     zolpidem (AMBIEN) 5 MG tablet; Take 1 tablet by mouth nightly as needed for Sleep for up to 30 days. , Disp-30 tablet, R-0Normal  5. Flank pain  -     POCT Urinalysis No Micro (Auto)  -     Culture, Urine; Future       Return in about 6 months (around 1/16/2022) for Call if systems do not improve or get worse. On this date 7/16/2021 I have spent 40+ minutes reviewing previous notes, test results and face to face with the patient discussing the diagnosis and importance of compliance with the treatment plan as well as documenting on the day of the visit. Patient Care Team:  Elisabet SettingMARIA DE JESUS - CNP as PCP - General (Nurse Practitioner Family)  Elisabet Setting, APRTISHA - CNP as PCP - [facility] Empaneled Provider    SUBJECTIVE/OBJECTIVE    History of Present illness / Visit Summary   Anxiety: Patient complains of evaluation of anxiety disorder, panic attacks and sleep disturbance. She has the following anxiety symptoms: difficulty concentrating, feelings of losing control, insomnia, irritable, palpitations, racing thoughts. Onset of symptoms was approximately several years ago, stable since that time.  She denies current suicidal and homicidal ideation. Previous treatment includes benzodiazepines Klonopin, Ambien. She complains of the following side effects from the treatment: none. Stressed to patient to never take both Klonopin and Ambien at the same time. Per refill, it is noted the patient does rarely use his medications. She utilizes Ambien at night to help her sleep. She only takes it if she is unable to fall asleep naturally. Klonopin she uses for moments of extreme anxiety and panic attack. Flank Pain  This is a recurrent problem. The current episode started more than 1 month ago. The problem occurs intermittently. The problem has been waxing and waning since onset. Pain location: right flank. The quality of the pain is described as aching. The pain does not radiate. The pain is mild. The symptoms are aggravated by twisting. Pertinent negatives include no abdominal pain, bladder incontinence, bowel incontinence, chest pain, dysuria, fever, headaches, numbness or pelvic pain. She has tried nothing for the symptoms. Review of Systems  Review of Systems   Constitutional: Positive for fatigue. Negative for activity change, appetite change, chills and fever. HENT: Negative for congestion, ear pain, postnasal drip, rhinorrhea, sinus pressure, sneezing, sore throat and trouble swallowing. Current dental exams    Eyes:        Glasses - current eye exam    Respiratory: Negative for cough, chest tightness, shortness of breath and wheezing. Cardiovascular: Negative for chest pain, palpitations and leg swelling. Gastrointestinal: Negative for abdominal pain, blood in stool, bowel incontinence, constipation, diarrhea and nausea. Genitourinary: Positive for flank pain. Negative for bladder incontinence, difficulty urinating, dysuria, frequency, hematuria, pelvic pain and urgency. Musculoskeletal: Negative for arthralgias, back pain, gait problem, joint swelling and myalgias.         Generalized body/joint aches    Skin: Negative for rash and wound. Allergic/Immunologic: Negative for environmental allergies. Neurological: Negative for dizziness, syncope, light-headedness, numbness and headaches. Hematological: Does not bruise/bleed easily. Psychiatric/Behavioral: Positive for sleep disturbance. Negative for agitation, decreased concentration, self-injury and suicidal ideas. The patient is nervous/anxious. Physical exam   Physical Exam  Vitals and nursing note reviewed. Constitutional:       General: She is not in acute distress. Appearance: Normal appearance. She is well-developed and well-groomed. She is not ill-appearing or toxic-appearing. HENT:      Head: Normocephalic. Right Ear: Ear canal and external ear normal. A middle ear effusion is present. There is no impacted cerumen. Tympanic membrane is not erythematous, retracted or bulging. Left Ear: Ear canal and external ear normal. A middle ear effusion is present. There is no impacted cerumen. Tympanic membrane is not erythematous, retracted or bulging. Nose: Mucosal edema present. No congestion or rhinorrhea. Right Turbinates: Swollen and pale. Not enlarged. Left Turbinates: Swollen and pale. Not enlarged. Right Sinus: No maxillary sinus tenderness or frontal sinus tenderness. Left Sinus: No maxillary sinus tenderness or frontal sinus tenderness. Mouth/Throat:      Lips: Pink. Mouth: Mucous membranes are moist.      Dentition: Normal dentition. No dental caries. Pharynx: Oropharynx is clear. No oropharyngeal exudate, posterior oropharyngeal erythema or uvula swelling. Comments: Post nasal drip   Eyes:      General: Lids are normal. Vision grossly intact. No allergic shiner. Conjunctiva/sclera: Conjunctivae normal.      Comments: Glasses    Cardiovascular:      Rate and Rhythm: Normal rate and regular rhythm. No extrasystoles are present. Pulses: Normal pulses.            Radial pulses are 2+ on the right side and 2+ on the left side. Dorsalis pedis pulses are 2+ on the right side and 2+ on the left side. Heart sounds: Normal heart sounds, S1 normal and S2 normal. No murmur heard. Pulmonary:      Effort: Pulmonary effort is normal. No accessory muscle usage, prolonged expiration or respiratory distress. Breath sounds: Normal breath sounds and air entry. Musculoskeletal:      Cervical back: Normal range of motion. No torticollis. No pain with movement. Right lower leg: No edema. Left lower leg: No edema. Lymphadenopathy:      Cervical: No cervical adenopathy. Skin:     General: Skin is warm and dry. Coloration: Skin is not ashen, cyanotic, jaundiced or pale. Neurological:      General: No focal deficit present. Mental Status: She is alert and oriented to person, place, and time. Motor: Motor function is intact. Gait: Gait is intact. Psychiatric:         Attention and Perception: Attention and perception normal.         Mood and Affect: Affect normal. Mood is anxious. Speech: Speech normal.         Behavior: Behavior normal. Behavior is cooperative. Thought Content: Thought content normal. Thought content does not include suicidal ideation. Thought content does not include suicidal plan. Cognition and Memory: Cognition and memory normal.         Judgment: Judgment normal.           Electronically signed by MARIA DE JESUS Echols CNP, APRN-CNP on 7/19/2021 at 7:37 AM    Please note that this chart was generated using voice recognition Dragon dictation software. Although every effort was made to ensure the accuracy of this automated transcription, some errors in transcription may have occurred.

## 2021-07-18 LAB
CULTURE: NORMAL
Lab: NORMAL
SPECIMEN DESCRIPTION: NORMAL

## 2021-07-19 ASSESSMENT — ENCOUNTER SYMPTOMS
NAUSEA: 0
CONSTIPATION: 0
BACK PAIN: 0
CHEST TIGHTNESS: 0
RHINORRHEA: 0
WHEEZING: 0
SORE THROAT: 0
BOWEL INCONTINENCE: 0
TROUBLE SWALLOWING: 0
ABDOMINAL PAIN: 0
SHORTNESS OF BREATH: 0
SINUS PRESSURE: 0
DIARRHEA: 0
BLOOD IN STOOL: 0
COUGH: 0

## 2021-07-19 ASSESSMENT — VISUAL ACUITY: OU: 1

## 2021-08-05 ENCOUNTER — TELEPHONE (OUTPATIENT)
Dept: FAMILY MEDICINE CLINIC | Age: 66
End: 2021-08-05

## 2021-08-05 DIAGNOSIS — B37.0 ORAL THRUSH: Primary | ICD-10-CM

## 2021-08-05 RX ORDER — CLOTRIMAZOLE 10 MG/1
10 LOZENGE ORAL; TOPICAL
Qty: 50 TABLET | Refills: 0 | Status: SHIPPED | OUTPATIENT
Start: 2021-08-05 | End: 2021-08-15

## 2021-08-05 NOTE — TELEPHONE ENCOUNTER
----- Message from Dale Kendrick sent at 8/5/2021  2:27 PM EDT -----  Subject: Message to Provider    QUESTIONS  Information for Provider? Patient has thrush starting again her tongue is   turning white for the 3rd time now. She is not sure why she keeps getting   it or how to prevent. She would like to know if something can be called in   for it. Please send to [employer] in [place]. Please call patient to advise.  ---------------------------------------------------------------------------  --------------  CALL BACK INFO  What is the best way for the office to contact you? OK to leave message on   voicemail  Preferred Call Back Phone Number? [phone]  ---------------------------------------------------------------------------  --------------  SCRIPT ANSWERS  Relationship to Patient?  Self

## 2021-08-09 ENCOUNTER — TELEPHONE (OUTPATIENT)
Dept: FAMILY MEDICINE CLINIC | Age: 66
End: 2021-08-09

## 2021-08-09 DIAGNOSIS — B37.0 ORAL THRUSH: Primary | ICD-10-CM

## 2021-08-09 DIAGNOSIS — F41.9 ANXIETY: ICD-10-CM

## 2021-08-09 DIAGNOSIS — Z13.1 SCREENING FOR DIABETES MELLITUS (DM): ICD-10-CM

## 2021-08-09 DIAGNOSIS — F41.0 PANIC DISORDER (EPISODIC PAROXYSMAL ANXIETY): ICD-10-CM

## 2021-08-09 DIAGNOSIS — E55.9 VITAMIN D DEFICIENCY: ICD-10-CM

## 2021-08-09 DIAGNOSIS — R71.8 OTHER ABNORMALITY OF RED BLOOD CELLS: ICD-10-CM

## 2021-08-09 DIAGNOSIS — Z13.6 SCREENING FOR CARDIOVASCULAR CONDITION: ICD-10-CM

## 2021-08-09 DIAGNOSIS — L29.8 OTHER PRURITUS: ICD-10-CM

## 2021-08-09 NOTE — TELEPHONE ENCOUNTER
----- Message from Elan Abebe sent at 8/6/2021 11:47 AM EDT -----  Subject: Message to Provider    QUESTIONS  Information for Provider? Patient is wanting to use the fluconazole   (DIFLUCAN) 100 MG tablet for her thrush instead of the clotrimazole   (MYCELEX) 10 MG mona. She had some of the fluconazole from the last time   she had oral thrush and would like to continue with that. Pls advise   ---------------------------------------------------------------------------  --------------  CALL BACK INFO  What is the best way for the office to contact you? OK to leave message on   voicemail  Preferred Call Back Phone Number? [phone]  ---------------------------------------------------------------------------  --------------  SCRIPT ANSWERS  Relationship to Patient?  Self

## 2021-08-09 NOTE — TELEPHONE ENCOUNTER
----- Message from Rita Flores sent at 8/6/2021 11:47 AM EDT -----  Subject: Message to Provider    QUESTIONS  Information for Provider? Patient is wanting to use the fluconazole   (DIFLUCAN) 100 MG tablet for her thrush instead of the clotrimazole   (MYCELEX) 10 MG mona. She had some of the fluconazole from the last time   she had oral thrush and would like to continue with that. Pls advise   ---------------------------------------------------------------------------  --------------  CALL BACK INFO  What is the best way for the office to contact you? OK to leave message on   voicemail  Preferred Call Back Phone Number? [phone]  ---------------------------------------------------------------------------  --------------  SCRIPT ANSWERS  Relationship to Patient?  Self

## 2021-08-10 NOTE — TELEPHONE ENCOUNTER
Patient asking how to decipher how she is getting the thrush what would be the next course of action at this time?    Please advise

## 2021-08-11 NOTE — TELEPHONE ENCOUNTER
Lab work. She has not completed any labs I have ordered. Has not had done in 2 years. I would like them to be completed. I updated to Epic. Basic labs in addition labs to rule out autoimmune diseases, diabetes, etc..  Hopefully all normal. If so, will guide in other directions to look/test.

## 2021-08-13 ENCOUNTER — HOSPITAL ENCOUNTER (OUTPATIENT)
Age: 66
Setting detail: SPECIMEN
Discharge: HOME | End: 2021-08-13
Payer: MEDICARE

## 2021-08-13 DIAGNOSIS — F41.0 PANIC DISORDER (EPISODIC PAROXYSMAL ANXIETY): ICD-10-CM

## 2021-08-13 DIAGNOSIS — E55.9 VITAMIN D DEFICIENCY: ICD-10-CM

## 2021-08-13 DIAGNOSIS — B37.0 ORAL THRUSH: ICD-10-CM

## 2021-08-13 DIAGNOSIS — L29.8 OTHER PRURITUS: ICD-10-CM

## 2021-08-13 DIAGNOSIS — R71.8 OTHER ABNORMALITY OF RED BLOOD CELLS: ICD-10-CM

## 2021-08-13 DIAGNOSIS — Z13.6 SCREENING FOR CARDIOVASCULAR CONDITION: ICD-10-CM

## 2021-08-13 DIAGNOSIS — Z13.1 SCREENING FOR DIABETES MELLITUS (DM): ICD-10-CM

## 2021-08-13 LAB
-: NORMAL
ABSOLUTE EOS #: 0.11 K/UL (ref 0–0.44)
ABSOLUTE IMMATURE GRANULOCYTE: <0.03 K/UL (ref 0–0.3)
ABSOLUTE LYMPH #: 2.18 K/UL (ref 1.1–3.7)
ABSOLUTE MONO #: 0.38 K/UL (ref 0.1–1.2)
ALBUMIN SERPL-MCNC: 4.2 G/DL (ref 3.5–5.2)
ALBUMIN/GLOBULIN RATIO: 1.4 (ref 1–2.5)
ALP BLD-CCNC: 80 U/L (ref 35–104)
ALT SERPL-CCNC: 14 U/L (ref 5–33)
AMORPHOUS: NORMAL
ANION GAP SERPL CALCULATED.3IONS-SCNC: 14 MMOL/L (ref 9–17)
AST SERPL-CCNC: 16 U/L
BACTERIA: NORMAL
BASOPHILS # BLD: 1 % (ref 0–2)
BASOPHILS ABSOLUTE: 0.03 K/UL (ref 0–0.2)
BILIRUB SERPL-MCNC: 0.75 MG/DL (ref 0.3–1.2)
BILIRUBIN URINE: NEGATIVE
BUN BLDV-MCNC: 15 MG/DL (ref 8–23)
BUN/CREAT BLD: NORMAL (ref 9–20)
C-REACTIVE PROTEIN: <3 MG/L (ref 0–5)
CALCIUM SERPL-MCNC: 9.2 MG/DL (ref 8.6–10.4)
CASTS UA: NORMAL /LPF (ref 0–8)
CHLORIDE BLD-SCNC: 105 MMOL/L (ref 98–107)
CHOLESTEROL/HDL RATIO: 3.5
CHOLESTEROL: 246 MG/DL
CO2: 22 MMOL/L (ref 20–31)
COLOR: YELLOW
COMMENT UA: ABNORMAL
CREAT SERPL-MCNC: 0.73 MG/DL (ref 0.5–0.9)
CRYSTALS, UA: NORMAL /HPF
DIFFERENTIAL TYPE: NORMAL
EOSINOPHILS RELATIVE PERCENT: 2 % (ref 1–4)
EPITHELIAL CELLS UA: NORMAL /HPF (ref 0–5)
FERRITIN: 97 UG/L (ref 13–150)
FOLATE: 17.7 NG/ML
GFR AFRICAN AMERICAN: >60 ML/MIN
GFR NON-AFRICAN AMERICAN: >60 ML/MIN
GFR SERPL CREATININE-BSD FRML MDRD: NORMAL ML/MIN/{1.73_M2}
GFR SERPL CREATININE-BSD FRML MDRD: NORMAL ML/MIN/{1.73_M2}
GLUCOSE FASTING: 92 MG/DL (ref 70–99)
GLUCOSE URINE: NEGATIVE
HCT VFR BLD CALC: 44.9 % (ref 36.3–47.1)
HDLC SERPL-MCNC: 71 MG/DL
HEMOGLOBIN: 14.6 G/DL (ref 11.9–15.1)
IMMATURE GRANULOCYTES: 0 %
INSULIN COMMENT: NORMAL
INSULIN REFERENCE RANGE:: NORMAL
INSULIN: 19.7 MU/L
IRON SATURATION: 45 % (ref 20–55)
IRON: 108 UG/DL (ref 37–145)
KETONES, URINE: NEGATIVE
LDL CHOLESTEROL: 160 MG/DL (ref 0–130)
LEUKOCYTE ESTERASE, URINE: ABNORMAL
LYMPHOCYTES # BLD: 37 % (ref 24–43)
MCH RBC QN AUTO: 30 PG (ref 25.2–33.5)
MCHC RBC AUTO-ENTMCNC: 32.5 G/DL (ref 28.4–34.8)
MCV RBC AUTO: 92.4 FL (ref 82.6–102.9)
MONOCYTES # BLD: 7 % (ref 3–12)
MUCUS: NORMAL
NITRITE, URINE: NEGATIVE
NRBC AUTOMATED: 0 PER 100 WBC
OTHER OBSERVATIONS UA: NORMAL
PDW BLD-RTO: 12.2 % (ref 11.8–14.4)
PH UA: 5 (ref 5–8)
PLATELET # BLD: 258 K/UL (ref 138–453)
PLATELET ESTIMATE: NORMAL
PMV BLD AUTO: 11.8 FL (ref 8.1–13.5)
POTASSIUM SERPL-SCNC: 4.4 MMOL/L (ref 3.7–5.3)
PROTEIN UA: NEGATIVE
RBC # BLD: 4.86 M/UL (ref 3.95–5.11)
RBC # BLD: NORMAL 10*6/UL
RBC UA: NORMAL /HPF (ref 0–4)
RENAL EPITHELIAL, UA: NORMAL /HPF
RHEUMATOID FACTOR: <10 IU/ML
SEDIMENTATION RATE, ERYTHROCYTE: 2 MM (ref 0–30)
SEG NEUTROPHILS: 53 % (ref 36–65)
SEGMENTED NEUTROPHILS ABSOLUTE COUNT: 3.18 K/UL (ref 1.5–8.1)
SODIUM BLD-SCNC: 141 MMOL/L (ref 135–144)
SPECIFIC GRAVITY UA: 1.02 (ref 1–1.03)
TOTAL IRON BINDING CAPACITY: 241 UG/DL (ref 250–450)
TOTAL PROTEIN: 7.2 G/DL (ref 6.4–8.3)
TRICHOMONAS: NORMAL
TRIGL SERPL-MCNC: 76 MG/DL
TSH SERPL DL<=0.05 MIU/L-ACNC: 2.29 MIU/L (ref 0.3–5)
TURBIDITY: ABNORMAL
UNSATURATED IRON BINDING CAPACITY: 133 UG/DL (ref 112–347)
URINE HGB: ABNORMAL
UROBILINOGEN, URINE: NORMAL
VITAMIN B-12: 725 PG/ML (ref 232–1245)
VITAMIN D 25-HYDROXY: 29.2 NG/ML (ref 30–100)
VLDLC SERPL CALC-MCNC: ABNORMAL MG/DL (ref 1–30)
WBC # BLD: 5.9 K/UL (ref 3.5–11.3)
WBC # BLD: NORMAL 10*3/UL
WBC UA: NORMAL /HPF (ref 0–5)
YEAST: NORMAL

## 2021-08-15 ENCOUNTER — NURSE TRIAGE (OUTPATIENT)
Dept: OTHER | Age: 66
End: 2021-08-15

## 2021-08-15 DIAGNOSIS — B37.0 ORAL THRUSH: Primary | ICD-10-CM

## 2021-08-15 NOTE — PROGRESS NOTES
Patient called into answering service this morning. She was treated for oral thrush for walk-in provider after being treated with oral antibiotic. She was given Diflucan. To my knowledge it helped however it came back worse. We sent more of a Trope/lozenge. It is noted that it is worsened, no improvement, and now is painful for her to swallow. I will send nystatin solution to the pharmacy. Patient will need to be seen in the office this week as I personally have never seen her symptoms in a want to ensure we are appropriately treating.

## 2021-08-17 ENCOUNTER — OFFICE VISIT (OUTPATIENT)
Dept: PRIMARY CARE CLINIC | Age: 66
End: 2021-08-17
Payer: MEDICARE

## 2021-08-17 ENCOUNTER — HOSPITAL ENCOUNTER (OUTPATIENT)
Age: 66
Setting detail: SPECIMEN
Discharge: HOME | End: 2021-08-17
Payer: MEDICARE

## 2021-08-17 VITALS
TEMPERATURE: 98 F | BODY MASS INDEX: 24.59 KG/M2 | HEART RATE: 87 BPM | WEIGHT: 157 LBS | DIASTOLIC BLOOD PRESSURE: 76 MMHG | SYSTOLIC BLOOD PRESSURE: 100 MMHG | OXYGEN SATURATION: 98 %

## 2021-08-17 DIAGNOSIS — R30.0 DYSURIA: ICD-10-CM

## 2021-08-17 DIAGNOSIS — N89.8 VAGINAL IRRITATION: Primary | ICD-10-CM

## 2021-08-17 LAB
BILIRUBIN, POC: NORMAL
BLOOD URINE, POC: NORMAL
CLARITY, POC: CLEAR
COLOR, POC: YELLOW
GLUCOSE URINE, POC: NORMAL
KETONES, POC: NORMAL
LEUKOCYTE EST, POC: NORMAL
NITRITE, POC: NORMAL
PH, POC: 5
PROTEIN, POC: NORMAL
SPECIFIC GRAVITY, POC: 1.02
UROBILINOGEN, POC: 0.2

## 2021-08-17 PROCEDURE — 99214 OFFICE O/P EST MOD 30 MIN: CPT | Performed by: FAMILY MEDICINE

## 2021-08-17 PROCEDURE — 81003 URINALYSIS AUTO W/O SCOPE: CPT | Performed by: FAMILY MEDICINE

## 2021-08-17 RX ORDER — MULTIVIT WITH MINERALS/LUTEIN
250 TABLET ORAL DAILY
COMMUNITY
End: 2021-09-14

## 2021-08-17 NOTE — PROGRESS NOTES
Subjective:  Marcello Tucker presents for   Chief Complaint   Patient presents with    Urinary Tract Infection     Urinary frequency. Going every hour. No burning. No pain. feels \"raw\" on the outside. Nocturia x 5 last night    This issue has been going on for 3 months on and off. She has been treated multiple times for uti, vaginal yeast infection without any evidence of disease, only subjeftive complaints    States she is currently being treatedfor oral thrush, because her toungue was white last week,.     saw her gyen in June and her exam and all tests were normal.    The only thing she was psoitive for was in June - she had gardnerella and was treated with metrogel. Patient Active Problem List   Diagnosis    Anxiety    Gastroesophageal reflux disease    Irritable bowel syndrome    Mitral valve prolapse    Asthma    Agoraphobia    Panic disorder     Objective:  Physical Exam   Vitals:   Vitals:    08/17/21 1515   BP: 100/76   Site: Left Upper Arm   Position: Sitting   Cuff Size: Medium Adult   Pulse: 87   Temp: 98 °F (36.7 °C)   SpO2: 98%   Weight: 157 lb (71.2 kg)     Wt Readings from Last 3 Encounters:   08/17/21 157 lb (71.2 kg)   07/16/21 162 lb 6.4 oz (73.7 kg)   06/14/21 159 lb (72.1 kg)     Ht Readings from Last 3 Encounters:   01/24/19 5' 7\" (1.702 m)     Body mass index is 24.59 kg/m². Constitutional: She is oriented to person, place, and time. She appears well-developed and well-nourished and in no acute distress. Answers all my questions appropriately. Throat: no erythema, tonsillar hypertrophy or exudate. No ulcerations noted. Lips/Teeth/Gums all appear normal. The oral area appears completely normal (tongue included.)      Abdomen: No distension noted.  + bowel sounds in all quadrants which are normoactive. No bruits noted. No masses could be palpated. No unusual pulsatile masses noted. To deep palpation, patient denied any significant pain.   No rebound, guarding or rigidity noted to my exam.        Urine poct was normal  Assessment:   Encounter Diagnoses   Name Primary?  Vaginal irritation Yes    Dysuria          Plan:   There are no discontinued medications. THE ABOVE NOTED DISCONTINUED MEDS MAY ONLY BE FROM 'CLEANING UP' THE MED LIST AND WERE NOT ACTUALLY CANCELED;  SEE CHART FOR DETAILS! No orders of the defined types were placed in this encounter. Orders Placed This Encounter   Procedures    Culture, Urine     Standing Status:   Future     Standing Expiration Date:   8/17/2022     Order Specific Question:   Specify (ex-cath, midstream, cysto, etc)? Answer:   clean catch    VAGINITIS DNA PROBE     Standing Status:   Future     Standing Expiration Date:   8/17/2022     Return in about 2 weeks (around 8/31/2021). There are no Patient Instructions on file for this visit. Follow up with provider      Discussed at length with patient that despite having sx we have never had any proof of any documented pathology. I advised her that I don't make a oral thrush dx unless I see it on the gingiva and buccal mucosa -     She should follow up with Kristine on her schedule visit in 2 weeks and discuss further the question of if she needs a cystoscopy and urological workup.   Everything else has been looked at and comes up normal.    Will check cultures today    ua though looked normal.

## 2021-08-18 ENCOUNTER — TELEPHONE (OUTPATIENT)
Dept: PRIMARY CARE CLINIC | Age: 66
End: 2021-08-18

## 2021-08-18 DIAGNOSIS — R35.0 INCREASED FREQUENCY OF URINATION: ICD-10-CM

## 2021-08-18 DIAGNOSIS — R30.0 DYSURIA: ICD-10-CM

## 2021-08-18 DIAGNOSIS — R30.0 BURNING WITH URINATION: ICD-10-CM

## 2021-08-18 DIAGNOSIS — N89.8 VAGINAL IRRITATION: ICD-10-CM

## 2021-08-18 LAB
DIRECT EXAM: ABNORMAL
Lab: ABNORMAL
SPECIMEN DESCRIPTION: ABNORMAL

## 2021-08-18 RX ORDER — METRONIDAZOLE 500 MG/1
500 TABLET ORAL 2 TIMES DAILY
Qty: 14 TABLET | Refills: 0 | Status: SHIPPED | OUTPATIENT
Start: 2021-08-18 | End: 2021-08-22 | Stop reason: SINTOL

## 2021-08-18 NOTE — TELEPHONE ENCOUNTER
Pt states that she would like her rx sent to the [facility]. She states that she is taking a probiotic strain that helps with yeast in her gut. She ordered it off [facility].

## 2021-08-19 DIAGNOSIS — B37.0 ORAL THRUSH: ICD-10-CM

## 2021-08-19 LAB
CULTURE: NORMAL
Lab: NORMAL
SPECIMEN DESCRIPTION: NORMAL

## 2021-08-19 NOTE — TELEPHONE ENCOUNTER
Orders pended for Editing and Approval     Patient is requesting Clotrimazole cream 10 mg to be refilled took the flagyl and said it was a little ruff on her stomach but said if provider can refill the cream because she was also told she had oral thrush and does not want the flagyl to make that worse. Patient said to notify her by my chart so she knows what is going to happen and if the refill will be approved.

## 2021-08-22 RX ORDER — CLOTRIMAZOLE 10 MG/1
10 LOZENGE ORAL; TOPICAL
Qty: 50 TABLET | Refills: 0 | OUTPATIENT
Start: 2021-08-22 | End: 2021-09-01

## 2021-08-22 RX ORDER — METRONIDAZOLE 7.5 MG/G
1 GEL VAGINAL DAILY
Qty: 1 TUBE | Refills: 0 | Status: SHIPPED | OUTPATIENT
Start: 2021-08-22 | End: 2021-08-27

## 2021-08-22 NOTE — TELEPHONE ENCOUNTER
As indicated in Dr. Yolanda Wall note. He did not see a clinical indication to diagnose oral thrush at the time of his visit. A vaginitis swab was performed and showed the patient was positive for bacterial vaginitis and negative for yeast.  If patient truly feels she is positive for oral thrush patient needs to be reevaluated in office as I did not see the patient.

## 2021-09-05 ENCOUNTER — TELEPHONE (OUTPATIENT)
Dept: OTHER | Age: 66
End: 2021-09-05

## 2021-09-05 NOTE — TELEPHONE ENCOUNTER
Patient called after hours and indicates she has been treated 2 times for yeast infection/bacterial overgrowth. Has developed vaginal discharge & burning- itching. Requesting another Rx. To treat discomfort. Contacted provider on call Dr. Janes Cha and recommends patient go to urgent care for a swab to confirm.   Patient notified and is reluctantly going to go to urgent care or call her GYN physician.--P.L./R.N.

## 2021-09-07 ENCOUNTER — OFFICE VISIT (OUTPATIENT)
Dept: FAMILY MEDICINE CLINIC | Age: 66
End: 2021-09-07
Payer: MEDICARE

## 2021-09-07 ENCOUNTER — HOSPITAL ENCOUNTER (OUTPATIENT)
Age: 66
Setting detail: SPECIMEN
Discharge: HOME | End: 2021-09-07
Payer: MEDICARE

## 2021-09-07 VITALS
RESPIRATION RATE: 18 BRPM | WEIGHT: 158 LBS | HEART RATE: 81 BPM | OXYGEN SATURATION: 98 % | TEMPERATURE: 98.2 F | SYSTOLIC BLOOD PRESSURE: 122 MMHG | BODY MASS INDEX: 24.75 KG/M2 | DIASTOLIC BLOOD PRESSURE: 82 MMHG

## 2021-09-07 DIAGNOSIS — B37.9 YEAST INFECTION: ICD-10-CM

## 2021-09-07 DIAGNOSIS — N89.8 VAGINAL DISCHARGE: ICD-10-CM

## 2021-09-07 DIAGNOSIS — N95.1 MENOPAUSAL HOT FLUSHES: ICD-10-CM

## 2021-09-07 DIAGNOSIS — B37.0 ORAL THRUSH: ICD-10-CM

## 2021-09-07 DIAGNOSIS — K13.3 HAIRY LEUKOPLAKIA OF TONGUE: Primary | ICD-10-CM

## 2021-09-07 DIAGNOSIS — F41.9 ANXIETY: ICD-10-CM

## 2021-09-07 LAB — HIV AG/AB: NONREACTIVE

## 2021-09-07 PROCEDURE — 99215 OFFICE O/P EST HI 40 MIN: CPT | Performed by: NURSE PRACTITIONER

## 2021-09-07 NOTE — TELEPHONE ENCOUNTER
Printed and given to provider earlier this afternoon. Patient was seen in office today, was this discussed?

## 2021-09-07 NOTE — PROGRESS NOTES
[address]  [phone]    9/7/21     Patient ID  Artemio Marino is a 77 y.o. female  Established patient    Chief Complaint  Artemio Marino presents today for Thrush (Oral- White, taste is off, denies any burning or swelling. Does notice dryness. ), Vaginal Discharge (Bacterial infection. Nystatin- finished last sunday. Antibiotic gel completed. ), and Discuss Labs (Cholesterol )      ASSESSMENT/PLAN  1. Hairy leukoplakia of tongue  2. Oral thrush  -     HIV Screen; Future  3. Vaginal discharge  -     Vaginitis DNA Probe; Future  -     HIV Screen; Future  4. Yeast infection  -     Estrogens, Fractionated; Future  -     Culture, Fungus, Blood; Future  -     HIV Screen; Future  5. Anxiety  -     clonazePAM (KLONOPIN) 0.5 MG tablet; Take 1 tablet by mouth 2 times daily as needed for Anxiety for up to 30 days. , Disp-14 tablet, R-0Normal  6. Menopausal hot flushes  -     Estrogens, Fractionated; Future  -     Culture, Fungus, Blood; Future     Will treat if abnormal vaginitis probe  Possible referral to ENT? ID? Gynecology? Return in about 4 weeks (around 10/5/2021) for Call if systems do not improve or get worse, As needed. On this date 9/7/2021 I have spent 40+ minutes reviewing previous notes, test results and face to face with the patient discussing the diagnosis and importance of compliance with the treatment plan as well as documenting on the day of the visit. Patient Care Team:  MARIA DE JESUS Griffith CNP as PCP - General (Nurse Practitioner Family)  MARIA DE JESUS Griffith CNP as PCP - Rodrigo Leahy Provider    SUBJECTIVE/OBJECTIVE  History of Present illness / Visit Summary     Patient presents to office today for follow-up appointment. It is noted that she has been having oral thrush for some time. It is noted the symptoms began after being prescribed antibiotics in May.   She then returned to the walk-in clinic for a vaginal yeast infection, in which she was treated. Since that time she has had ongoing pH imbalances to her vagina as well as oral thrush. Today again she is having vaginal discharge and vaginal swab was completed. We will treat appropriately for results. Continues to have ongoing thrush to her tongue. Never within her cheeks. This did completely resolve with Diflucan in the past, resolved, resolved with nystatin swish and swallow, and now has returned. Please see media for documentation. Discussed with patient multiple etiologies considered. Patient has already completed a battery of blood work to ensure she has no underlying autoimmune disease and inflammatory processes. We will test today for other disease processes such as HIV. If continue to find the rationale discussed referral to ENT. Patients case was discussed with collaborator and physican who has sen in walk in. Review of Systems  Review of Systems   Constitutional: Positive for appetite change and fatigue. Negative for activity change. Respiratory: Negative for cough, chest tightness and shortness of breath. Cardiovascular: Negative for chest pain, palpitations and leg swelling. Gastrointestinal: Negative for abdominal distention and abdominal pain. Genitourinary: Positive for vaginal discharge. Negative for difficulty urinating, dysuria, frequency, hematuria, pelvic pain, urgency, vaginal bleeding and vaginal pain. Skin:        Abnormality to tongue    Psychiatric/Behavioral: Positive for sleep disturbance. Negative for agitation, decreased concentration, self-injury and suicidal ideas. The patient is nervous/anxious. Physical exam   Physical Exam  Vitals and nursing note reviewed. Constitutional:       General: She is not in acute distress. Appearance: Normal appearance. She is well-developed and well-groomed. She is not ill-appearing or toxic-appearing. HENT:      Mouth/Throat:      Lips: Pink.       Mouth: Mucous membranes are moist. No oral lesions. Dentition: Normal dentition. No dental caries. Tongue: Lesions present. Palate: No mass and lesions. Pharynx: Oropharynx is clear. No posterior oropharyngeal erythema. Eyes:      Comments: Glasses    Cardiovascular:      Rate and Rhythm: Normal rate and regular rhythm. No extrasystoles are present. Heart sounds: Normal heart sounds, S1 normal and S2 normal. No murmur heard. Pulmonary:      Effort: Pulmonary effort is normal. No prolonged expiration or respiratory distress. Breath sounds: Normal breath sounds and air entry. Skin:     General: Skin is warm and dry. Coloration: Skin is not ashen, cyanotic, jaundiced or pale. Neurological:      Mental Status: She is alert and oriented to person, place, and time. Motor: Motor function is intact. Gait: Gait is intact. Psychiatric:         Attention and Perception: Attention and perception normal.         Mood and Affect: Affect normal. Mood is anxious. Speech: Speech normal.         Behavior: Behavior normal. Behavior is cooperative. Thought Content: Thought content normal. Thought content does not include suicidal ideation. Thought content does not include suicidal plan. Cognition and Memory: Cognition and memory normal.         Judgment: Judgment normal.               Electronically signed by MARIA DE JESUS Nieto CNP, APRN-CNP on 9/14/2021 at 4:02 AM    Please note that this chart was generated using voice recognition Dragon dictation software. Although every effort was made to ensure the accuracy of this automated transcription, some errors in transcription may have occurred.

## 2021-09-07 NOTE — PATIENT INSTRUCTIONS
depression);  · cirrhosis or other liver disease;  · kidney disease;  · heart disease, high blood pressure, high cholesterol;  · diabetes;  · narrow-angle glaucoma;  · a thyroid disorder;  · a history of seizures;  · a bleeding or blood clotting disorder;  · low levels of sodium in your blood; or  · if you are switching to venlafaxine from another antidepressant. Some young people have thoughts about suicide when first taking an antidepressant. Your doctor should check your progress at regular visits. Your family or other caregivers should also be alert to changes in your mood or symptoms. Venlafaxine may cause serious lung problems in a  if the mother takes the medicine late in pregnancy (during the third trimester). However, you may have a relapse of depression if you stop taking your antidepressant. Tell your doctor right away if you become pregnant. Do not start or stop taking venlafaxine during pregnancy without your doctor's advice. You should not breast-feed while using this medicine. Venlafaxine is not approved for use by anyone younger than 25years old. How should I take venlafaxine? Follow all directions on your prescription label and read all medication guides or instruction sheets. Use the medicine exactly as directed. Venlafaxine should be taken with food. Try to take venlafaxine at the same time each day. Swallow the extended-release capsule or tablet whole and do not crush, chew, break, or open it. If you cannot swallow a capsule whole, open it and sprinkle the medicine into a spoonful of applesauce. Swallow the mixture right away without chewing. Do not save it for later use. It may take several weeks before your symptoms improve. Keep using the medication as directed. Do not stop using venlafaxine without first talking to your doctor. You may have unpleasant side effects if you stop taking this medicine suddenly. Your blood pressure will need to be checked often.   This medicine may affect a drug-screening urine test and you may have false results. Tell the laboratory staff that you use venlafaxine. Store at room temperature away from moisture and heat. What happens if I miss a dose? Take the medicine as soon as you can, but skip the missed dose if it is almost time for your next dose. Do not take two doses at one time. What happens if I overdose? Seek emergency medical attention or call the Poison Help line at 1-377.703.5878. What should I avoid while taking venlafaxine? Drinking alcohol with this medicine can cause side effects. Ask your doctor before taking a nonsteroidal anti-inflammatory drug (NSAID) such as aspirin, ibuprofen (Advil, Motrin), naproxen (Aleve), celecoxib (Celebrex), diclofenac, indomethacin, meloxicam, and others. Using an NSAID with venlafaxine may cause you to bruise or bleed easily. Avoid driving or hazardous activity until you know how this medicine will affect you. Your reactions could be impaired. What are the possible side effects of venlafaxine? Get emergency medical help if you have signs of an allergic reaction: skin rash or hives; difficulty breathing; swelling of your face, lips, tongue, or throat. Report any new or worsening symptoms to your doctor, such as: mood or behavior changes, anxiety, panic attacks, trouble sleeping, or if you feel impulsive, irritable, agitated, hostile, aggressive, restless, hyperactive (mentally or physically), more depressed, or have thoughts about suicide or hurting yourself.   Call your doctor at once if you have:  · blurred vision, tunnel vision, eye pain or swelling, or seeing halos around lights;  · easy bruising or bleeding (nosebleeds, bleeding gums), blood in your urine or stools, coughing up blood;  · cough, chest tightness, trouble breathing;  · a seizure (convulsions);  · low sodium level  --headache, confusion, slurred speech, severe weakness, vomiting, loss of coordination, feeling unsteady; or  · severe nervous system reaction --very stiff (rigid) muscles, high fever, sweating, confusion, fast or uneven heartbeats, tremors, feeling like you might pass out. Seek medical attention right away if you have symptoms of serotonin syndrome, such as: agitation, hallucinations, fever, sweating, shivering, fast heart rate, muscle stiffness, twitching, loss of coordination, nausea, vomiting, or diarrhea  Common side effects may include:  · dizziness, drowsiness,  · anxiety, feeling nervous;  · sleep problems (insomnia);  · vision changes;  · nausea, vomiting, diarrhea;  · changes in weight or appetite;  · dry mouth, yawning;  · increased sweating; or  · decreased sex drive, impotence, abnormal ejaculation, difficulty having an orgasm. This is not a complete list of side effects and others may occur. Call your doctor for medical advice about side effects. You may report side effects to FDA at 7-180-FDA-8651. What other drugs will affect venlafaxine? Using venlafaxine with other drugs that make you drowsy can worsen this effect. Ask your doctor before using opioid medication, a sleeping pill, a muscle relaxer, or medicine for anxiety or seizures. Tell your doctor about all your current medicines. Many drugs can affect venlafaxine, especially:  · any other antidepressant;  · cimetidine;  · Cucumber's wort;  · tramadol;  · tryptophan (sometimes called L-tryptophan);  · a blood thinner --warfarin, Coumadin, Jantoven;  · medicine to treat mood disorders, thought disorders, or mental illness --buspirone, lithium, and many others; or  · migraine headache medicine --sumatriptan, zolmitriptan, and others. This list is not complete and many other drugs may affect venlafaxine. This includes prescription and over-the-counter medicines, vitamins, and herbal products. Not all possible drug interactions are listed here. Where can I get more information? Your pharmacist can provide more information about venlafaxine.   Remember, keep this and all other medicines out of the reach of children, never share your medicines with others, and use this medication only for the indication prescribed. Every effort has been made to ensure that the information provided by [address] is accurate, up-to-date, and complete, but no guarantee is made to that effect. Drug information contained herein may be time sensitive. [facility] information has been compiled for use by healthcare practitioners and consumers in the [place] and therefore [facility] does not warrant that uses outside of the [place] are appropriate, unless specifically indicated otherwise. [facility]'s drug information does not endorse drugs, diagnose patients or recommend therapy. [facility]'s drug information is an informational resource designed to assist licensed healthcare practitioners in caring for their patients and/or to serve consumers viewing this service as a supplement to, and not a substitute for, the expertise, skill, knowledge and judgment of healthcare practitioners. The absence of a warning for a given drug or drug combination in no way should be construed to indicate that the drug or drug combination is safe, effective or appropriate for any given patient. [facility] does not assume any responsibility for any aspect of healthcare administered with the aid of information [facility] provides. The information contained herein is not intended to cover all possible uses, directions, precautions, warnings, drug interactions, allergic reactions, or adverse effects. If you have questions about the drugs you are taking, check with your doctor, nurse or pharmacist.  Copyright 2801-7774 [address] [place] [address] Avenue: 15.01. Revision date: 5/4/2018. Care instructions adapted under license by [facility] ([facility]).  If you have questions about a medical condition or this instruction, always ask your healthcare professional. [place] [address] any warranty or liability for your use of this information.

## 2021-09-08 DIAGNOSIS — N76.0 VAGINOSIS: Primary | ICD-10-CM

## 2021-09-08 LAB
DIRECT EXAM: ABNORMAL
Lab: ABNORMAL
SPECIMEN DESCRIPTION: ABNORMAL

## 2021-09-08 RX ORDER — CLONAZEPAM 0.5 MG/1
0.5 TABLET ORAL 2 TIMES DAILY PRN
Qty: 14 TABLET | Refills: 0 | Status: SHIPPED | OUTPATIENT
Start: 2021-09-08 | End: 2021-11-03 | Stop reason: SDUPTHER

## 2021-09-08 RX ORDER — METRONIDAZOLE 7.5 MG/G
1 GEL VAGINAL DAILY
Qty: 70 G | Refills: 0 | Status: SHIPPED | OUTPATIENT
Start: 2021-09-08 | End: 2021-09-13

## 2021-09-08 RX ORDER — METRONIDAZOLE 500 MG/1
500 TABLET ORAL 2 TIMES DAILY
Qty: 14 TABLET | Refills: 0 | Status: SHIPPED | OUTPATIENT
Start: 2021-09-08 | End: 2021-09-15

## 2021-09-10 ENCOUNTER — TELEPHONE (OUTPATIENT)
Dept: FAMILY MEDICINE CLINIC | Age: 66
End: 2021-09-10

## 2021-09-10 DIAGNOSIS — K13.3 HAIRY LEUKOPLAKIA OF TONGUE: ICD-10-CM

## 2021-09-10 DIAGNOSIS — B37.0 ORAL THRUSH: Primary | ICD-10-CM

## 2021-09-10 NOTE — TELEPHONE ENCOUNTER
Patient has stated she does not want to take ATB therapy, Patient just got rid of oral thrush and does not want to get it back. Patient states she has been doing this since May, and does not want to go back where she started.        Please advise

## 2021-09-11 LAB
ESTRADIOL LEVEL: 6 PG/ML
ESTROGEN TOTAL: 33.4 PG/ML
ESTRONE: 27.4 PG/ML

## 2021-09-14 ASSESSMENT — ENCOUNTER SYMPTOMS
ABDOMINAL PAIN: 0
SHORTNESS OF BREATH: 0
CHEST TIGHTNESS: 0
ABDOMINAL DISTENTION: 0
COUGH: 0

## 2021-09-16 NOTE — TELEPHONE ENCOUNTER
Please ensure patient notified. .  We attempted to reach out again 6 days ago. Please reach out again for continuity of care.

## 2021-09-24 LAB
CULTURE: NORMAL
Lab: NORMAL
SPECIMEN DESCRIPTION: NORMAL

## 2021-10-11 ENCOUNTER — OFFICE VISIT (OUTPATIENT)
Dept: FAMILY MEDICINE CLINIC | Age: 66
End: 2021-10-11
Payer: MEDICARE

## 2021-10-11 ENCOUNTER — PATIENT MESSAGE (OUTPATIENT)
Dept: FAMILY MEDICINE CLINIC | Age: 66
End: 2021-10-11

## 2021-10-11 VITALS
WEIGHT: 157.6 LBS | TEMPERATURE: 98.6 F | RESPIRATION RATE: 16 BRPM | HEART RATE: 75 BPM | SYSTOLIC BLOOD PRESSURE: 116 MMHG | BODY MASS INDEX: 24.68 KG/M2 | DIASTOLIC BLOOD PRESSURE: 82 MMHG | OXYGEN SATURATION: 97 %

## 2021-10-11 DIAGNOSIS — F41.9 ANXIETY: ICD-10-CM

## 2021-10-11 DIAGNOSIS — F41.9 ANXIETY: Primary | ICD-10-CM

## 2021-10-11 DIAGNOSIS — F40.00 AGORAPHOBIA: ICD-10-CM

## 2021-10-11 DIAGNOSIS — F41.0 PANIC DISORDER: ICD-10-CM

## 2021-10-11 PROCEDURE — 99215 OFFICE O/P EST HI 40 MIN: CPT | Performed by: NURSE PRACTITIONER

## 2021-10-11 RX ORDER — ESCITALOPRAM OXALATE 5 MG/1
5 TABLET ORAL DAILY
Qty: 30 TABLET | Refills: 0 | Status: SHIPPED | OUTPATIENT
Start: 2021-10-11 | End: 2022-01-05

## 2021-10-11 RX ORDER — ESCITALOPRAM OXALATE 10 MG/1
10 TABLET ORAL DAILY
Qty: 30 TABLET | Refills: 0 | Status: SHIPPED | OUTPATIENT
Start: 2021-10-11 | End: 2021-10-11 | Stop reason: DRUGHIGH

## 2021-10-11 ASSESSMENT — ENCOUNTER SYMPTOMS
ABDOMINAL DISTENTION: 0
SHORTNESS OF BREATH: 0
ABDOMINAL PAIN: 0
CHEST TIGHTNESS: 0
COUGH: 0

## 2021-10-11 NOTE — PROGRESS NOTES
[address]  [phone]    10/11/21     Patient ID  Kiera Martinez is a 77 y.o. female  Established patient    Chief Complaint  Kiera Martinez presents today for Thrush ([place]), Flank Pain (Worse after sleeping on her side. Upper left side. Onset 1-2 months. Feels \"bruised\" and aching. ), and Anxiety (Exhausted in the morning. Ongoing. )    Have you seen any other physician or provider since your last visit? Dentist  Have you had any other diagnostic tests since your last visit? Yes - Records Obtained Blood Culture, Vaginitis, XR Teeth  Have you been seen in the emergency room and/or had an admission to a hospital since we last saw you? No     ASSESSMENT/PLAN  1. Anxiety  -     escitalopram (LEXAPRO) 5 MG tablet; Take 1 tablet by mouth daily, Disp-30 tablet, R-0Normal  2. Agoraphobia  3. Panic disorder       Return in about 4 weeks (around 11/8/2021) for Anxiety, Call if systems do not improve or get worse. On this date 10/11/2021 I have spent 44+ minutes reviewing previous notes, test results and face to face with the patient discussing the diagnosis and importance of compliance with the treatment plan as well as documenting on the day of the visit. Patient Care Team:  MARIA DE JESUS Rausch CNP as PCP - General (Nurse Practitioner Family)  MARIA DE JESUS Rausch CNP as PCP - Rodrigo Leahy Provider    SUBJECTIVE/OBJECTIVE  History of Present illness / Visit Summary     Patient presents for a follow-up appointment today. It is noted that she did have a dental appointment in March that has been recommended  Contacts? There are concerning monitoring for TMJ. Patient, she tends to go to the dentist because she does not like the 1 that she recently saw. Also noted she did not follow with ENT. Patient comments it is too expensive for ENT referral, w and her symptomology of her tongue have resolved.     Patient is having worsening Behavior: Behavior normal. Behavior is cooperative. Thought Content: Thought content normal. Thought content does not include homicidal or suicidal ideation. Thought content does not include homicidal or suicidal plan. Cognition and Memory: Cognition and memory normal.         Judgment: Judgment normal.           Electronically signed by MARIA DE JESUS Macedo CNP, APRN-CNP on 10/31/2021 at 7:46 PM    Please note that this chart was generated using voice recognition Dragon dictation software. Although every effort was made to ensure the accuracy of this automated transcription, some errors in transcription may have occurred.

## 2021-10-16 ENCOUNTER — NURSE TRIAGE (OUTPATIENT)
Dept: OTHER | Facility: CLINIC | Age: 66
End: 2021-10-16

## 2021-10-16 NOTE — TELEPHONE ENCOUNTER
Received call from [facility] at [facility] with [employer]. Brief description of triage: Swelling in neck and glands. Triage indicates for patient to See PCP within 3 days. Care advice provided, patient verbalizes understanding; denies any other questions or concerns; instructed to call back for any new or worsening symptoms. Patient states that she does not want to be scheduled, but states that she will go to a walk-in clinic because \"[facility] is always full\"    Patient warm transferred [facility] with [facility]. Attention Provider: Thank you for allowing me to participate in the care of your patient. The patient was connected to triage in response to information provided to the ECC/PSC. Please do not respond through this encounter as the response is not directed to a shared pool. Reason for Disposition   [1] Very tender to the touch AND [2] no fever    Answer Assessment - Initial Assessment Questions  1. LOCATION: \"Where is the swollen node located? \" \"Is the matching node on the other side of the body also swollen? \"       Under jaw and comes up to the end of chin. Goes from ear down     2. SIZE: \"How big is the node? \" (Inches or centimeters) (or compare to common objects such as pea, bean, marble, golf ball)       Swollen and sore     3. ONSET: \"When did the swelling start? \"       On and off for the last 2 days. 4. NECK NODES: \"Is there a sore throat, runny nose or other symptoms of a cold? \"       Denies     5. GROIN OR ARMPIT NODES: \"Is there a sore, scratch, cut or painful red area on that arm or leg? \"       N/A    6. FEVER: \"Do you have a fever? \" If so, ask: \"What is it, how was it measured, and when did it start? \"       denies     7. CAUSE: \"What do you think is causing the swollen lymph nodes? \"      Unknown - no open wounds. May be TMJ, but never presented like this    8. OTHER SYMPTOMS: \"Do you have any other symptoms? \"      Denies     9.  PREGNANCY: \"Is there any chance you are pregnant? \" \"When was your last menstrual period? \"      No     No problems with swallowing, breathing    Protocols used: LYMPH NODES - [facility]

## 2021-10-18 ENCOUNTER — TELEPHONE (OUTPATIENT)
Dept: FAMILY MEDICINE CLINIC | Age: 66
End: 2021-10-18

## 2021-10-18 NOTE — TELEPHONE ENCOUNTER
----- Message from Meghann Lobato sent at 10/18/2021  1:12 PM EDT -----  Subject: Message to Provider    QUESTIONS  Information for Provider? went to urgent care with swollen neck, was put   on medication, Flonase, and steroid for five days, still has some swelling   but medication is helping some, also on anxiety medication but it is not   helping at all- causes nausea- does not want another prescription for   anxiety, interested in future gene test, next appt 11/10/21   ---------------------------------------------------------------------------  --------------  CALL BACK INFO  What is the best way for the office to contact you? OK to leave message on   voicemail  Preferred Call Back Phone Number? [phone]  ---------------------------------------------------------------------------  --------------  SCRIPT ANSWERS  Relationship to Patient?  Self

## 2021-10-19 NOTE — TELEPHONE ENCOUNTER
Pt went to [address] in [place]. Pt was given nasal spray and 5 day steroid. Pt states she will try these and if she doesn't get relief in a few days pt will call our office for appointment.

## 2021-11-03 RX ORDER — CLONAZEPAM 0.5 MG/1
0.5 TABLET ORAL 2 TIMES DAILY PRN
Qty: 14 TABLET | Refills: 0 | Status: SHIPPED | OUTPATIENT
Start: 2021-11-03 | End: 2023-01-05

## 2021-11-03 NOTE — TELEPHONE ENCOUNTER
LOV 10/11/21  RTO 4 weeks; F/U scheduled  LRF 9/8/21  CSM Not listed    Health Maintenance   Topic Date Due    Colon cancer screen colonoscopy  Never done    Breast cancer screen  Never done    Shingles Vaccine (1 of 2) Never done    DEXA (modify frequency per FRAX score)  Never done    Pneumococcal 65+ years Vaccine (1 of 1 - PPSV23) Never done   [employer] Visit (AWV)  Never done    Flu vaccine (1) Never done    Lipid screen  08/13/2026    DTaP/Tdap/Td vaccine (2 - Td or Tdap) 09/28/2030    COVID-19 Vaccine  Completed    Hepatitis A vaccine  Aged Out    Hepatitis B vaccine  Aged Out    Hib vaccine  Aged Out    Meningococcal (ACWY) vaccine  Aged Out    Hepatitis C screen  Discontinued             (applicable per patient's age: Cancer Screenings, Depression Screening, Fall Risk Screening, Immunizations)    Hemoglobin A1C (%)   Date Value   12/14/2019 4.8     LDL Cholesterol (mg/dL)   Date Value   08/13/2021 160 (H)     AST (U/L)   Date Value   08/13/2021 16     ALT (U/L)   Date Value   08/13/2021 14     BUN (mg/dL)   Date Value   08/13/2021 15      (goal A1C is < 7)   (goal LDL is <100) need 30-50% reduction from baseline     BP Readings from Last 3 Encounters:   10/11/21 116/82   09/07/21 122/82   08/17/21 100/76    (goal /80)      All Future Testing planned in [facility]:  Lab Frequency Next Occurrence       Next Visit Date:  Future Appointments   Date Time Provider Leeann Dc   11/10/2021 12:00 PM MARIA DE JESUS Viveros CNP [facility]   1/19/2022  1:00 PM MARIA DE JESUS Viveros CNP [facility]            Patient Active Problem List:     Anxiety     Gastroesophageal reflux disease     Irritable bowel syndrome     Mitral valve prolapse     Asthma     Agoraphobia     Panic disorder

## 2021-11-08 NOTE — TELEPHONE ENCOUNTER
From [facility] msg bucket:QUESTIONS   Information for Provider? The patient is calling today to cancel   appointment for 11/10/21. Patient states that she has discussed   medications with Kristine, and that she has sent a message Via myhub, but has   received no response to her last msg.  Patient states that she is no longer   taking the other medication as previously discussed and will be sticking   with the Klonopam. Please respond via myhub if possible

## 2021-11-29 DIAGNOSIS — K21.9 GASTROESOPHAGEAL REFLUX DISEASE WITHOUT ESOPHAGITIS: ICD-10-CM

## 2021-11-30 RX ORDER — OMEPRAZOLE 20 MG/1
20 CAPSULE, DELAYED RELEASE ORAL DAILY
Qty: 90 CAPSULE | Refills: 1 | Status: SHIPPED | OUTPATIENT
Start: 2021-11-30 | End: 2022-05-31

## 2021-11-30 NOTE — TELEPHONE ENCOUNTER
Last visit: 10-11-21  Last Med refill: 5-4-21  Does patient have enough medication for 72 hours: Yes    Next Visit Date:  Future Appointments   Date Time Provider Leeann Dc   1/19/2022  1:00 PM MARIA DE JESUS Multani - CNP [facility] [address] Maintenance   Topic Date Due    Colon cancer screen colonoscopy  Never done    Breast cancer screen  Never done    Shingles Vaccine (1 of 2) Never done    DEXA (modify frequency per FRAX score)  Never done    Pneumococcal 65+ years Vaccine (1 of 1 - PPSV23) Never done   [employer] Annual Wellness Visit (AWV)  Never done    Flu vaccine (1) Never done    COVID-19 Vaccine (3 - Booster for Moderna series) 11/05/2021    Lipid screen  08/13/2026    DTaP/Tdap/Td vaccine (2 - Td or Tdap) 09/28/2030    Hepatitis A vaccine  Aged Out    Hepatitis B vaccine  Aged Out    Hib vaccine  Aged Out    Meningococcal (ACWY) vaccine  Aged Out    Hepatitis C screen  Discontinued       Hemoglobin A1C (%)   Date Value   12/14/2019 4.8             ( goal A1C is < 7)   No results found for: LABMICR  LDL Cholesterol (mg/dL)   Date Value   08/13/2021 160 (H)   12/14/2019 152 (H)       (goal LDL is <100)   AST (U/L)   Date Value   08/13/2021 16     ALT (U/L)   Date Value   08/13/2021 14     BUN (mg/dL)   Date Value   08/13/2021 15     BP Readings from Last 3 Encounters:   10/11/21 116/82   09/07/21 122/82   08/17/21 100/76          (goal 120/80)    All Future Testing planned in [facility]  Lab Frequency Next Occurrence               Patient Active Problem List:     Anxiety     Gastroesophageal reflux disease     Irritable bowel syndrome     Mitral valve prolapse     Asthma     Agoraphobia     Panic disorder

## 2021-12-02 ENCOUNTER — TELEPHONE (OUTPATIENT)
Dept: FAMILY MEDICINE CLINIC | Age: 66
End: 2021-12-02

## 2021-12-02 NOTE — TELEPHONE ENCOUNTER
Patient requesting to change her Jan 2022 visit to VV. If approved by AD, please call patient and change appt.

## 2021-12-03 NOTE — TELEPHONE ENCOUNTER
Patient stated that she would like to leave appointment as a office visit at this time, but may call and change is to a VV after the holidays.

## 2022-01-05 ENCOUNTER — OFFICE VISIT (OUTPATIENT)
Dept: FAMILY MEDICINE CLINIC | Age: 67
End: 2022-01-05
Payer: MEDICARE

## 2022-01-05 VITALS
RESPIRATION RATE: 18 BRPM | DIASTOLIC BLOOD PRESSURE: 78 MMHG | OXYGEN SATURATION: 98 % | WEIGHT: 161 LBS | SYSTOLIC BLOOD PRESSURE: 112 MMHG | HEART RATE: 78 BPM | TEMPERATURE: 98.8 F | BODY MASS INDEX: 25.22 KG/M2

## 2022-01-05 DIAGNOSIS — F41.9 ANXIETY: Primary | ICD-10-CM

## 2022-01-05 DIAGNOSIS — F41.0 PANIC DISORDER: ICD-10-CM

## 2022-01-05 PROCEDURE — 99215 OFFICE O/P EST HI 40 MIN: CPT | Performed by: NURSE PRACTITIONER

## 2022-01-05 RX ORDER — FLUTICASONE PROPIONATE 50 MCG
SPRAY, SUSPENSION (ML) NASAL DAILY PRN
COMMUNITY
Start: 2021-10-16

## 2022-01-05 NOTE — PROGRESS NOTES
[address]  [phone]    1/5/22     Patient ID  Rex Akhtar is a 77 y.o. female  Established patient    Chief Complaint  Rex Akhtar presents today for Anxiety, Asthma, Irritable Bowel Syndrome, and Medication Check    Have you seen any other physician or provider since your last visit? Yes - Records Requested Dentist   Have you had any other diagnostic tests since your last visit? No  Have you been seen in the emergency room and/or had an admission to a hospital since we last saw you? No     ASSESSMENT/PLAN  1. Anxiety  2. Panic disorder     No medications to be refilled     Return in about 6 months (around 7/5/2022) for Anxiety. On this date 1/5/2022 I have spent 40+ minutes reviewing previous notes, test results and face to face with the patient discussing the diagnosis and importance of compliance with the treatment plan as well as documenting on the day of the visit. Patient Care Team:  MARIA DE JESUS Echavarria CNP as PCP - General (Nurse Practitioner Family)  MARIA DE JESUS Echavarria CNP as PCP - [facility] Empaneled Provider    SUBJECTIVE/OBJECTIVE  History of Present illness / Visit Summary     Here for follow up for anxiety  Stopped Lexapro with in 1 week  Utilizing self tools for increase anxiety  No more concerns with tongue, fungus  Still with TMJ and biting cheek when sleeping  Has been to multiple dentisits       Review of Systems  Review of Systems   Constitutional: Negative for activity change, appetite change and fatigue. Respiratory: Negative for cough, chest tightness and shortness of breath. Cardiovascular: Negative for chest pain, palpitations and leg swelling. Gastrointestinal: Negative for abdominal distention and abdominal pain. Known IBS   Genitourinary: Negative for difficulty urinating, dysuria, frequency, hematuria, pelvic pain, urgency, vaginal bleeding, vaginal discharge and vaginal pain. Psychiatric/Behavioral: Negative for agitation (improved ), decreased concentration, self-injury, sleep disturbance (improved ) and suicidal ideas. The patient is not nervous/anxious (improved). Using self tools to calm, and sleep        Physical exam   Physical Exam  Vitals and nursing note reviewed. Constitutional:       General: She is not in acute distress. Appearance: Normal appearance. She is well-developed and well-groomed. She is not ill-appearing or toxic-appearing. Cardiovascular:      Rate and Rhythm: Normal rate and regular rhythm. No extrasystoles are present. Heart sounds: Normal heart sounds, S1 normal and S2 normal. No murmur heard. Pulmonary:      Effort: Pulmonary effort is normal. No prolonged expiration or respiratory distress. Breath sounds: Normal breath sounds and air entry. Skin:     General: Skin is warm and dry. Coloration: Skin is not ashen, cyanotic, jaundiced or pale. Neurological:      Mental Status: She is alert and oriented to person, place, and time. Motor: Motor function is intact. Gait: Gait is intact. Psychiatric:         Attention and Perception: Attention and perception normal.         Mood and Affect: Mood and affect normal.         Speech: Speech normal.         Behavior: Behavior normal. Behavior is cooperative. Thought Content: Thought content normal. Thought content does not include suicidal ideation. Thought content does not include suicidal plan. Cognition and Memory: Cognition and memory normal.         Judgment: Judgment normal.           Electronically signed by MARIA DE JESUS Nixon CNP, APRN-CNP on 1/16/2022 at 8:14 PM    Please note that this chart was generated using voice recognition Dragon dictation software. Although every effort was made to ensure the accuracy of this automated transcription, some errors in transcription may have occurred.

## 2022-01-16 ASSESSMENT — ENCOUNTER SYMPTOMS
SHORTNESS OF BREATH: 0
ABDOMINAL DISTENTION: 0
COUGH: 0
ABDOMINAL PAIN: 0
CHEST TIGHTNESS: 0

## 2022-03-01 ENCOUNTER — TELEPHONE (OUTPATIENT)
Dept: FAMILY MEDICINE CLINIC | Age: 67
End: 2022-03-01

## 2022-03-01 NOTE — TELEPHONE ENCOUNTER
Patient called into the office and would like to know if she would be able to get an order for an xray on her right side of her jaw. He has been to a dentist and a specialist and both of them say that is not tooth related nor is it TMJ. She would like to get this going so results can be gone over at upcoming appointment. States that she is in pain all day and night with it. Offered the walk in clinic to patient and she was not interested in getting another provider involved.

## 2022-03-02 ENCOUNTER — TELEPHONE (OUTPATIENT)
Dept: FAMILY MEDICINE CLINIC | Age: 67
End: 2022-03-02

## 2022-03-02 NOTE — TELEPHONE ENCOUNTER
Patient went to [facility] ER for dental pain  Please call and follow up

## 2022-03-04 NOTE — TELEPHONE ENCOUNTER
[facility] ([facility]) ED Follow up Call    Reason for ED visit:  Dental Pain- Cheek/Jaw Swelling- Fluid on ear    Did you receive discharge instructions? Yes  Do you understand the discharge instructions? Yes  Did the ED give you any new prescriptions? Yes  Were you able to fill your prescriptions? Yes        Do you need or want to make a follow up appt with your PCP? Yes- patient scheduled with PCP. Has appt with [facility] in [place] on 3/16/22. They plan on shaving down cap and XR of head/jaw region. She is taking steroids and allegra as prescribed by [facility] ED. Do you have any further needs in the home i.e. Equipment?   No        FU appts/Provider:    Future Appointments   Date Time Provider Leeann Dc   3/22/2022  2:00 PM Kristine Moore, APRN - CNP [facility] [address]

## 2022-03-22 ENCOUNTER — OFFICE VISIT (OUTPATIENT)
Dept: FAMILY MEDICINE CLINIC | Age: 67
End: 2022-03-22
Payer: MEDICARE

## 2022-03-22 VITALS
SYSTOLIC BLOOD PRESSURE: 114 MMHG | WEIGHT: 157.4 LBS | DIASTOLIC BLOOD PRESSURE: 80 MMHG | OXYGEN SATURATION: 98 % | BODY MASS INDEX: 24.65 KG/M2 | RESPIRATION RATE: 16 BRPM | HEART RATE: 88 BPM | TEMPERATURE: 98 F

## 2022-03-22 DIAGNOSIS — H93.11 TINNITUS OF RIGHT EAR: ICD-10-CM

## 2022-03-22 DIAGNOSIS — H69.91 EUSTACHIAN TUBE DISORDER, RIGHT: ICD-10-CM

## 2022-03-22 DIAGNOSIS — R68.84 CHRONIC JAW PAIN: Primary | ICD-10-CM

## 2022-03-22 DIAGNOSIS — G89.29 CHRONIC JAW PAIN: Primary | ICD-10-CM

## 2022-03-22 PROCEDURE — 99214 OFFICE O/P EST MOD 30 MIN: CPT | Performed by: NURSE PRACTITIONER

## 2022-03-22 RX ORDER — FLUTICASONE PROPIONATE 50 MCG
1 SPRAY, SUSPENSION (ML) NASAL DAILY
Qty: 3 EACH | Refills: 3 | Status: SHIPPED | OUTPATIENT
Start: 2022-03-22 | End: 2023-03-22

## 2022-03-22 RX ORDER — FEXOFENADINE HCL 180 MG/1
180 TABLET ORAL DAILY
Qty: 90 TABLET | Refills: 3 | Status: SHIPPED | OUTPATIENT
Start: 2022-03-22 | End: 2022-06-20

## 2022-03-22 NOTE — PROGRESS NOTES
[address]  [phone]    3/22/22     Patient ID  Sharon Sims is a 79 y.o. female  Established patient    Chief Complaint  Sharon Sims presents today for Jaw Pain (F/U from dental. TMJ Specialist?)    Have you seen any other physician or provider since your last visit? Yes - Records Obtained [facility]- Jaw pain 3/1/22- Intermittent swelling and pain (steroids are the only thing that have helped so far)  Have you had any other diagnostic tests since your last visit? Yes - Records Obtained XR Teeth, XR Jaw- No TMJ  Have you been seen in the emergency room and/or had an admission to a hospital since we last saw you? Yes - Records Obtained     ASSESSMENT/PLAN  1. Chronic jaw pain  2. Tinnitus of right ear  3. Eustachian tube disorder, right  -     fluticasone (FLONASE) 50 MCG/ACT nasal spray; 1 spray by Each Nostril route daily, Disp-3 each, R-3Normal  -     fexofenadine (ALLEGRA) 180 MG tablet; Take 1 tablet by mouth daily, Disp-90 tablet, R-3Normal    Encouraged to use antihistamine and Flonase daily  Follow as planned with dentist (is to have cap repaired)   Consider CT sinus? Chronic sinusitis? May need 3 weeks antibiotics? More concern of abnormality with inner ear? Return for Call if systems do not improve or get worse, As needed. Patient Care Team:  MARIA DE JESUS Boone CNP as PCP - General (Nurse Practitioner Family)  MARIA DE JESUS Boone CNP as PCP - [facility] Empaneled Provider    SUBJECTIVE/OBJECTIVE  History of Present illness / Visit Summary   Presents for follow up concerning dental pain. Recently was in ER - notes reviewed. 3/1/2022 ER -   This is a 77year old F presenting to ED for evaluation of R sided cheek pain/swelling. Reports she had a cap placed to the R lower posterior molar years ago, has intermittently had issues with it since.  States since October she has had worsening swelling and discomfort Patient needs order for manual lymphedema drainage.  The fluid retention in her arm is quite elevated.  Please let her know when order is in system.    Leonor Smith, lymphedema therapist is recommending that she do this twice per year, 4 days each time.   associated with this, believes it is secondary to biting the cheek due to how large the cap is. States she has seen two dentists and [facility] for this, was told to return to see dentist in three weeks for re-evaluation and filing of the cap. Here today because she is anxious as to whether or not she is having an infection associated with it. There are no associated fevers, no sore throat, no trismus, no drooling, no facial redness, no nausea or vomiting. No foul breath or halitosis. No other symptoms or complaints. prescribed Allegra and Prednisone     Review of Systems  Review of Systems   Constitutional: Positive for appetite change. Negative for activity change, chills, fatigue, fever and unexpected weight change. HENT: Positive for dental problem, ear pain (fullness), postnasal drip, sinus pressure and tinnitus. Negative for congestion, drooling, hearing loss, rhinorrhea, sore throat, trouble swallowing and voice change. Multiple recent dental exams, x-rays and orthodontic exam. Past injury to left side face/jaw. Right TMJ. Using mouth guard at night. clenches teeth frequently, associated with anxiety. Grinds teeth at night. Continued pain to right jaw. Taken OTC allergy medications as recommended, only makes more tolerable. treated with steroids. Improvement until prescription completed. Genitourinary: Negative for difficulty urinating and dysuria. Allergic/Immunologic: Positive for environmental allergies. Neurological: Negative for dizziness, light-headedness and headaches (on occasion? ). Psychiatric/Behavioral: Positive for sleep disturbance. Negative for agitation, decreased concentration, dysphoric mood, self-injury and suicidal ideas. The patient is not nervous/anxious (anxiety improving. ). Physical exam   Physical Exam  Vitals and nursing note reviewed. Constitutional:       General: She is not in acute distress. Appearance: Normal appearance.  She is well-developed and well-groomed. She is not ill-appearing or toxic-appearing. HENT:      Head: Normocephalic. Right Ear: Ear canal and external ear normal. A middle ear effusion is present. There is no impacted cerumen. Tympanic membrane is retracted. Tympanic membrane is not erythematous or bulging. Left Ear: Ear canal and external ear normal. A middle ear effusion is present. There is no impacted cerumen. Tympanic membrane is retracted. Tympanic membrane is not erythematous or bulging. Nose: Mucosal edema present. No congestion or rhinorrhea. Right Turbinates: Not enlarged or swollen. Left Turbinates: Not enlarged or swollen. Right Sinus: No maxillary sinus tenderness or frontal sinus tenderness. Left Sinus: No maxillary sinus tenderness or frontal sinus tenderness. Mouth/Throat:      Lips: Pink. Mouth: Mucous membranes are moist.      Dentition: Normal dentition. No dental caries. Pharynx: Oropharynx is clear. No oropharyngeal exudate, posterior oropharyngeal erythema or uvula swelling. Eyes:      General: Lids are normal. Vision grossly intact. Cardiovascular:      Rate and Rhythm: Normal rate and regular rhythm. No extrasystoles are present. Heart sounds: Normal heart sounds, S1 normal and S2 normal. No murmur heard. Pulmonary:      Effort: Pulmonary effort is normal. No accessory muscle usage, prolonged expiration or respiratory distress. Breath sounds: Normal breath sounds and air entry. No wheezing, rhonchi or rales. Musculoskeletal:      Cervical back: No torticollis. No pain with movement. Normal range of motion. Right lower leg: No edema. Left lower leg: No edema. Lymphadenopathy:      Cervical: No cervical adenopathy. Skin:     General: Skin is warm and dry. Coloration: Skin is not ashen, cyanotic, jaundiced or pale. Neurological:      General: No focal deficit present.       Mental Status: She is alert and oriented to person, place, and time. Motor: Motor function is intact. Gait: Gait is intact. Psychiatric:         Attention and Perception: Attention and perception normal.         Mood and Affect: Affect normal. Mood is anxious. Speech: Speech normal.         Behavior: Behavior normal. Behavior is cooperative. Thought Content: Thought content normal. Thought content does not include suicidal ideation. Thought content does not include suicidal plan. Cognition and Memory: Cognition and memory normal.         Judgment: Judgment normal.           Electronically signed by MARIA DE JESUS Rojas CNP, APRN-CNP on 4/8/2022 at 3:28 PM    Please note that this chart was generated using voice recognition Dragon dictation software. Although every effort was made to ensure the accuracy of this automated transcription, some errors in transcription may have occurred.

## 2022-03-27 ENCOUNTER — PATIENT MESSAGE (OUTPATIENT)
Dept: FAMILY MEDICINE CLINIC | Age: 67
End: 2022-03-27

## 2022-03-27 DIAGNOSIS — R68.84 JAW PAIN: Primary | ICD-10-CM

## 2022-03-27 DIAGNOSIS — H69.91 EUSTACHIAN TUBE DISORDER, RIGHT: ICD-10-CM

## 2022-03-27 DIAGNOSIS — I34.1 MVP (MITRAL VALVE PROLAPSE): ICD-10-CM

## 2022-03-27 DIAGNOSIS — H93.13 TINNITUS OF BOTH EARS: ICD-10-CM

## 2022-03-27 DIAGNOSIS — S03.00XD DISLOCATION OF TEMPOROMANDIBULAR JOINT, SUBSEQUENT ENCOUNTER: ICD-10-CM

## 2022-04-08 ENCOUNTER — TELEPHONE (OUTPATIENT)
Dept: FAMILY MEDICINE CLINIC | Age: 67
End: 2022-04-08

## 2022-04-08 ASSESSMENT — ENCOUNTER SYMPTOMS
RHINORRHEA: 0
TROUBLE SWALLOWING: 0
SINUS PRESSURE: 1
SORE THROAT: 0
VOICE CHANGE: 0

## 2022-04-08 ASSESSMENT — VISUAL ACUITY: OU: 1

## 2022-04-08 NOTE — TELEPHONE ENCOUNTER
[facility] called stating they cant submit the CT IAC order to patients insurance until office note is signed. She states it is best if signed by the end of the day, so it is not denied over the weekend.    P: [phone] Gibson Ahsan)

## 2022-04-12 ENCOUNTER — TELEPHONE (OUTPATIENT)
Dept: FAMILY MEDICINE CLINIC | Age: 67
End: 2022-04-12

## 2022-04-12 DIAGNOSIS — H93.13 BILATERAL TINNITUS: ICD-10-CM

## 2022-04-12 DIAGNOSIS — R68.84 JAW PAIN: Primary | ICD-10-CM

## 2022-04-12 NOTE — TELEPHONE ENCOUNTER
[facility] called and stated the CT IAC w contrast can only be ordered by the radiologist if they feel it's necessary and a new order wo contrast needed to be put in, I put in updated order and signed it.

## 2022-04-13 ENCOUNTER — HOSPITAL ENCOUNTER (OUTPATIENT)
Dept: CT IMAGING | Facility: CLINIC | Age: 67
Discharge: HOME | End: 2022-04-15
Payer: MEDICARE

## 2022-04-13 DIAGNOSIS — H93.13 BILATERAL TINNITUS: ICD-10-CM

## 2022-04-13 DIAGNOSIS — R68.84 JAW PAIN: ICD-10-CM

## 2022-04-13 PROCEDURE — 70480 CT ORBIT/EAR/FOSSA W/O DYE: CPT

## 2022-05-30 DIAGNOSIS — K21.9 GASTROESOPHAGEAL REFLUX DISEASE WITHOUT ESOPHAGITIS: ICD-10-CM

## 2022-05-31 RX ORDER — OMEPRAZOLE 20 MG/1
CAPSULE, DELAYED RELEASE ORAL
Qty: 90 CAPSULE | Refills: 3 | Status: SHIPPED | OUTPATIENT
Start: 2022-05-31

## 2022-05-31 NOTE — TELEPHONE ENCOUNTER
LOV 3/22/22  LRF 11/30/21  RTO No return date    Health Maintenance   Topic Date Due    Annual Wellness Visit (AWV)  Never done    Pneumococcal 65+ years Vaccine (1 - PCV) Never done    Colorectal Cancer Screen  Never done    Breast cancer screen  Never done    Shingles vaccine (1 of 2) Never done    DEXA (modify frequency per FRAX score)  Never done    COVID-19 Vaccine (3 - Booster for Moderna series) 10/05/2021    Depression Screen  07/16/2022    Flu vaccine (Season Ended) 09/01/2022    Lipids  08/13/2026    DTaP/Tdap/Td vaccine (2 - Td or Tdap) 09/28/2030    Hepatitis A vaccine  Aged Out    Hepatitis B vaccine  Aged Out    Hib vaccine  Aged Out    Meningococcal (ACWY) vaccine  Aged Out    Hepatitis C screen  Discontinued             (applicable per patient's age: Cancer Screenings, Depression Screening, Fall Risk Screening, Immunizations)    Hemoglobin A1C (%)   Date Value   12/14/2019 4.8     LDL Cholesterol (mg/dL)   Date Value   08/13/2021 160 (H)     AST (U/L)   Date Value   08/13/2021 16     ALT (U/L)   Date Value   08/13/2021 14     BUN (mg/dL)   Date Value   08/13/2021 15      (goal A1C is < 7)   (goal LDL is <100) need 30-50% reduction from baseline     BP Readings from Last 3 Encounters:   03/22/22 114/80   01/05/22 112/78   10/11/21 116/82    (goal /80)      All Future Testing planned in [facility]:  Lab Frequency Next Occurrence   EKG 12 lead Once 03/30/2022   ECHO Complete 2D W Doppler W Color Once 03/31/2022       Next Visit Date:  Future Appointments   Date Time Provider Leeann Dc   8/3/2022  1:00 PM Kristine Barcenas, APRN - CNP [facility]            Patient Active Problem List:     Anxiety     Gastroesophageal reflux disease     Irritable bowel syndrome     Mitral valve prolapse     Asthma     Agoraphobia     Panic disorder

## 2022-06-17 ENCOUNTER — TELEPHONE (OUTPATIENT)
Dept: FAMILY MEDICINE CLINIC | Age: 67
End: 2022-06-17

## 2022-06-17 LAB
ALBUMIN SERPL-MCNC: NORMAL G/DL
ALP BLD-CCNC: NORMAL U/L
ALT SERPL-CCNC: NORMAL U/L
ANION GAP SERPL CALCULATED.3IONS-SCNC: 11 MMOL/L
AST SERPL-CCNC: NORMAL U/L
BASOPHILS ABSOLUTE: 1 /ΜL
BASOPHILS RELATIVE PERCENT: 1 %
BILIRUB SERPL-MCNC: NORMAL MG/DL
BILIRUBIN, URINE: NEGATIVE
BLOOD, URINE: POSITIVE
BUN BLDV-MCNC: 14 MG/DL
CALCIUM SERPL-MCNC: 8.7 MG/DL
CHLORIDE BLD-SCNC: 106 MMOL/L
CLARITY: CLEAR
CO2: 24 MMOL/L
COLOR: YELLOW
CREAT SERPL-MCNC: 0.73 MG/DL
EOSINOPHILS ABSOLUTE: 0 /ΜL
EOSINOPHILS RELATIVE PERCENT: 0 %
GFR CALCULATED: 96
GLUCOSE BLD-MCNC: 107 MG/DL
GLUCOSE URINE: NEGATIVE
HCT VFR BLD CALC: 40.9 % (ref 36–46)
HEMOGLOBIN: 14.2 G/DL (ref 12–16)
KETONES, URINE: POSITIVE
LEUKOCYTE ESTERASE, URINE: NEGATIVE
LYMPHOCYTES ABSOLUTE: 0.9 /ΜL
LYMPHOCYTES RELATIVE PERCENT: 18 %
MCH RBC QN AUTO: 31.2 PG
MCHC RBC AUTO-ENTMCNC: 34.7 G/DL
MCV RBC AUTO: 90 FL
MONOCYTES ABSOLUTE: 0.2 /ΜL
MONOCYTES RELATIVE PERCENT: 4 %
NEUTROPHILS ABSOLUTE: 4 /ΜL
NEUTROPHILS RELATIVE PERCENT: 77 %
NITRITE, URINE: NEGATIVE
PH UA: 6 (ref 4.5–8)
PLATELET # BLD: 196 K/ΜL
PMV BLD AUTO: 9.4 FL
POTASSIUM SERPL-SCNC: 3.8 MMOL/L
PROTEIN UA: NEGATIVE
RBC # BLD: 4.55 10^6/ΜL
SODIUM BLD-SCNC: 140 MMOL/L
SPECIFIC GRAVITY, URINE: 1.01
TOTAL PROTEIN: NORMAL
UROBILINOGEN, URINE: NORMAL
WBC # BLD: 5.1 10^3/ML

## 2022-06-17 NOTE — TELEPHONE ENCOUNTER
Encourage to take with food. If feels not helping she needs to call that UC in which ordered it as they did assessment and has urine results. Please obtain these records.

## 2022-06-17 NOTE — TELEPHONE ENCOUNTER
Patient went to ED bc she was starting to get sick. They ended up doing an IV for fluids, gave some pain relief and told her to take the abx for 5 days instead of 14 and to cont. With probiotic. Also gave diflucan for yeast if she develops. She is feeling much better after fluids. Is going to let us know if she needs an appt or anything further at this time.

## 2022-06-17 NOTE — TELEPHONE ENCOUNTER
Pt went to [facility] urgent care yesterday and was Dx'd as having a UTI was put on macrobid 100mg  And is making her have nausea and vomiting. She only took 1 pill and is feeling worse today. She wants to know if a different antibiotic could be called in or what she should do.

## 2022-06-20 ENCOUNTER — PATIENT MESSAGE (OUTPATIENT)
Dept: FAMILY MEDICINE CLINIC | Age: 67
End: 2022-06-20

## 2022-06-22 ENCOUNTER — TELEPHONE (OUTPATIENT)
Dept: FAMILY MEDICINE CLINIC | Age: 67
End: 2022-06-22

## 2022-06-22 NOTE — TELEPHONE ENCOUNTER
ED Follow up Call    Reason for ED visit:  UTI  Status:     not changed    Did you call your PCP prior to going to the ED? yes    Did you receive a discharge instructions from the Emergency Room? Yes  Review of Instructions:     Understands what to report/when to return?:  Yes   Understands discharge instructions?:  Yes   Following discharge instructions?:  Yes   If not why? Are there any new complaints of pain? No  New Pain Meds? No    Constipation prophylaxis needed? N/A    If you have a wound is the dressing clean, dry, and intact? N/A  Understands wound care regimen? N/A    Are there any other complaints/concerns that you wish to tell your provider? Patient was having tingling in her feet and legs , hands coming and going in all different places. Patient went  ER to find she was having a allergic reaction to the ATB  Macrobid she was treated with anti nausea , fluids and pain medication through IV. Patient was advised to start taking  taking allegra. Patient went to [facility] today and was tested again for UTI  And they will treat with a different ATB when results come in. Patient has a appointment with OB GYN next week. FU appts/Provider:    Future Appointments   Date Time Provider Leeann Dc   7/12/2022  1:00 PM MARIA DE JESUS Hdz CNP [facility]   8/3/2022  1:00 PM MARIA DE JESUS Hdz CNP [facility]           New Medications?:   Yes      Medication Reconciliation by phone - Yes  Understands Medications? Yes  Taking Medications? Yes  Can you swallow your pills? Yes    Any further needs in the home i.e. Equipment?   Not Applicable    Link to services in community?:  N/A   Which services:

## 2022-07-12 ENCOUNTER — OFFICE VISIT (OUTPATIENT)
Dept: FAMILY MEDICINE CLINIC | Age: 67
End: 2022-07-12
Payer: MEDICARE

## 2022-07-12 VITALS
HEART RATE: 78 BPM | OXYGEN SATURATION: 98 % | DIASTOLIC BLOOD PRESSURE: 80 MMHG | WEIGHT: 154.6 LBS | SYSTOLIC BLOOD PRESSURE: 104 MMHG | BODY MASS INDEX: 24.21 KG/M2 | RESPIRATION RATE: 16 BRPM

## 2022-07-12 DIAGNOSIS — F41.9 ANXIETY: Primary | ICD-10-CM

## 2022-07-12 DIAGNOSIS — Z86.19 FREQUENT INFECTIONS: ICD-10-CM

## 2022-07-12 DIAGNOSIS — R68.84 JAW PAIN: ICD-10-CM

## 2022-07-12 DIAGNOSIS — K14.6 TONGUE PAIN: ICD-10-CM

## 2022-07-12 DIAGNOSIS — B37.0 ORAL THRUSH: ICD-10-CM

## 2022-07-12 DIAGNOSIS — K21.9 GASTROESOPHAGEAL REFLUX DISEASE WITHOUT ESOPHAGITIS: ICD-10-CM

## 2022-07-12 DIAGNOSIS — I34.1 MITRAL VALVE PROLAPSE: ICD-10-CM

## 2022-07-12 PROCEDURE — 99214 OFFICE O/P EST MOD 30 MIN: CPT | Performed by: NURSE PRACTITIONER

## 2022-07-12 PROCEDURE — 1123F ACP DISCUSS/DSCN MKR DOCD: CPT | Performed by: NURSE PRACTITIONER

## 2022-07-12 RX ORDER — ESTRADIOL 10 UG/1
INSERT VAGINAL
COMMUNITY

## 2022-07-12 ASSESSMENT — ANXIETY QUESTIONNAIRES
5. BEING SO RESTLESS THAT IT IS HARD TO SIT STILL: 0
7. FEELING AFRAID AS IF SOMETHING AWFUL MIGHT HAPPEN: 0
3. WORRYING TOO MUCH ABOUT DIFFERENT THINGS: 1
1. FEELING NERVOUS, ANXIOUS, OR ON EDGE: 1
4. TROUBLE RELAXING: 0
GAD7 TOTAL SCORE: 3
6. BECOMING EASILY ANNOYED OR IRRITABLE: 0
2. NOT BEING ABLE TO STOP OR CONTROL WORRYING: 1
IF YOU CHECKED OFF ANY PROBLEMS ON THIS QUESTIONNAIRE, HOW DIFFICULT HAVE THESE PROBLEMS MADE IT FOR YOU TO DO YOUR WORK, TAKE CARE OF THINGS AT HOME, OR GET ALONG WITH OTHER PEOPLE: SOMEWHAT DIFFICULT

## 2022-07-12 ASSESSMENT — PATIENT HEALTH QUESTIONNAIRE - PHQ9
10. IF YOU CHECKED OFF ANY PROBLEMS, HOW DIFFICULT HAVE THESE PROBLEMS MADE IT FOR YOU TO DO YOUR WORK, TAKE CARE OF THINGS AT HOME, OR GET ALONG WITH OTHER PEOPLE: 1
SUM OF ALL RESPONSES TO PHQ QUESTIONS 1-9: 6
7. TROUBLE CONCENTRATING ON THINGS, SUCH AS READING THE NEWSPAPER OR WATCHING TELEVISION: 0
4. FEELING TIRED OR HAVING LITTLE ENERGY: 1
5. POOR APPETITE OR OVEREATING: 0
3. TROUBLE FALLING OR STAYING ASLEEP: 0
SUM OF ALL RESPONSES TO PHQ9 QUESTIONS 1 & 2: 3
1. LITTLE INTEREST OR PLEASURE IN DOING THINGS: 2
9. THOUGHTS THAT YOU WOULD BE BETTER OFF DEAD, OR OF HURTING YOURSELF: 0
SUM OF ALL RESPONSES TO PHQ QUESTIONS 1-9: 6
SUM OF ALL RESPONSES TO PHQ QUESTIONS 1-9: 6
6. FEELING BAD ABOUT YOURSELF - OR THAT YOU ARE A FAILURE OR HAVE LET YOURSELF OR YOUR FAMILY DOWN: 2
2. FEELING DOWN, DEPRESSED OR HOPELESS: 1
SUM OF ALL RESPONSES TO PHQ QUESTIONS 1-9: 6
8. MOVING OR SPEAKING SO SLOWLY THAT OTHER PEOPLE COULD HAVE NOTICED. OR THE OPPOSITE, BEING SO FIGETY OR RESTLESS THAT YOU HAVE BEEN MOVING AROUND A LOT MORE THAN USUAL: 0

## 2022-07-12 NOTE — PROGRESS NOTES
[address]  [phone]    7/12/22     Patient ID  Joanna Saucedo is a 79 y.o. female  Established patient    Chief Complaint  Jaonna Saucedo presents today for Numbness (Legs, hands (tips of fingers) and feet. Lasts a few min. Notices it daily. ), Tingling (Ongoing. But getting slightly better. Burning on top of feet or toes. ), Urinary Tract Infection (Antibioitic adverse reaction. Antibiotic for 5 days. ), Ear Fullness (Itching ), and Bleeding/Bruising (Bit by a fly yesterday. )    Have you seen any other physician or provider since your last visit? Yes - Records Obtained 7/2/22 [facility] 6/22/22 UTI   Have you had any other diagnostic tests since your last visit? Yes - Records Obtained Urinalysis   Have you been seen in the emergency room and/or had an admission to a hospital since we last saw you? Yes - Records Obtained    ASSESSMENT/PLAN  1. Anxiety  -     Lupus Anticoagulant; Future  -     Thyroid Antibodies; Future  -     TSH with Reflex; Future  -     Echocardiogram complete; Future  -     EKG 12 lead; Future  2. Jaw pain  -     Echocardiogram complete; Future  -     EKG 12 lead; Future  3. Gastroesophageal reflux disease without esophagitis  4. Oral thrush  -     Lupus Anticoagulant; Future  5. Tongue pain  -     Vitamin B12 & Folate; Future  -     Vitamin E; Future  6. Mitral valve prolapse  -     Echocardiogram complete; Future  -     EKG 12 lead; Future  7. Frequent infections  -     Lupus Anticoagulant; Future  -     Echocardiogram complete; Future     Rule out cardiac? Rule out inflammatory disease? Ongoing infections? Yeast?     Return for Anxiety.     Patient Care Team:  MARIA DE JESUS Rob CNP as PCP - General (Nurse Practitioner Family)  MARIA DE JESUS Rob CNP as PCP - [facility] Empaneled Provider    SUBJECTIVE/OBJECTIVE  History of Present illness / Visit Summary   Patient presents today for follow up   Reviewed health maintenance and any recent labs/imaging    States went to [facility] in [facility] and was prescribed nystatin swish and swallow. After first dose Macrobid burning sensation to BLE? Review of Systems  Review of Systems   Constitutional:  Negative for activity change, appetite change, fatigue and fever. Thrush resolved    HENT:  Positive for dental problem and ear pain. Negative for congestion, ear discharge, facial swelling, hearing loss, mouth sores, postnasal drip, rhinorrhea, sinus pressure, sneezing, sore throat, tinnitus and trouble swallowing. Respiratory:  Negative for cough, choking, chest tightness and shortness of breath. Cardiovascular:  Negative for chest pain and palpitations. Neurological:  Positive for numbness (bilateral finger and toes ). Negative for dizziness, facial asymmetry, speech difficulty and headaches. Psychiatric/Behavioral:  Positive for agitation. Negative for self-injury, sleep disturbance and suicidal ideas. The patient is nervous/anxious. Physical exam   Physical Exam  Vitals and nursing note reviewed. Constitutional:       General: She is not in acute distress. Appearance: Normal appearance. She is well-developed and well-groomed. She is not ill-appearing or toxic-appearing. Cardiovascular:      Rate and Rhythm: Normal rate and regular rhythm. No extrasystoles are present. Heart sounds: Normal heart sounds, S1 normal and S2 normal. No murmur heard. Pulmonary:      Effort: Pulmonary effort is normal. No prolonged expiration or respiratory distress. Breath sounds: Normal breath sounds and air entry. Musculoskeletal:      Right lower leg: No edema. Left lower leg: No edema. Skin:     General: Skin is warm and dry. Coloration: Skin is not ashen, cyanotic, jaundiced or pale. Neurological:      Mental Status: She is alert and oriented to person, place, and time. Motor: Motor function is intact. Gait: Gait is intact. Psychiatric:         Attention and Perception: Attention and perception normal.         Mood and Affect: Mood and affect normal.         Speech: Speech normal.         Behavior: Behavior normal. Behavior is cooperative. Thought Content: Thought content normal. Thought content does not include suicidal ideation. Thought content does not include suicidal plan. Cognition and Memory: Cognition and memory normal.         Judgment: Judgment normal.         Electronically signed by MARIA DE JESUS Santoyo CNP, APRN-CNP on 7/24/2022 at 6:06 PM    Please note that this chart was generated using voice recognition Dragon dictation software. Although every effort was made to ensure the accuracy of this automated transcription, some errors in transcription may have occurred.

## 2022-07-13 ENCOUNTER — HOSPITAL ENCOUNTER (OUTPATIENT)
Age: 67
Setting detail: SPECIMEN
Discharge: HOME | End: 2022-07-13

## 2022-07-13 DIAGNOSIS — F41.9 ANXIETY: ICD-10-CM

## 2022-07-13 DIAGNOSIS — Z86.19 FREQUENT INFECTIONS: ICD-10-CM

## 2022-07-13 DIAGNOSIS — K14.6 TONGUE PAIN: ICD-10-CM

## 2022-07-13 DIAGNOSIS — B37.0 ORAL THRUSH: ICD-10-CM

## 2022-07-13 LAB
FOLATE: 17.2 NG/ML
TSH SERPL DL<=0.05 MIU/L-ACNC: 1.55 UIU/ML (ref 0.3–5)
VITAMIN B-12: 891 PG/ML (ref 232–1245)

## 2022-07-15 ENCOUNTER — HOSPITAL ENCOUNTER (OUTPATIENT)
Age: 67
Discharge: HOME | End: 2022-07-15
Payer: MEDICARE

## 2022-07-15 ENCOUNTER — HOSPITAL ENCOUNTER (OUTPATIENT)
Dept: NON INVASIVE DIAGNOSTICS | Age: 67
Discharge: HOME | End: 2022-07-17
Payer: MEDICARE

## 2022-07-15 DIAGNOSIS — Z86.19 FREQUENT INFECTIONS: ICD-10-CM

## 2022-07-15 DIAGNOSIS — F41.9 ANXIETY: ICD-10-CM

## 2022-07-15 DIAGNOSIS — R68.84 JAW PAIN: ICD-10-CM

## 2022-07-15 DIAGNOSIS — I34.1 MITRAL VALVE PROLAPSE: ICD-10-CM

## 2022-07-15 LAB
LV EF: 63 %
LVEF MODALITY: NORMAL

## 2022-07-15 PROCEDURE — 93306 TTE W/DOPPLER COMPLETE: CPT

## 2022-07-15 PROCEDURE — 93005 ELECTROCARDIOGRAM TRACING: CPT | Performed by: NURSE PRACTITIONER

## 2022-07-16 LAB
EKG ATRIAL RATE: 67 BPM
EKG P AXIS: 72 DEGREES
EKG P-R INTERVAL: 204 MS
EKG Q-T INTERVAL: 402 MS
EKG QRS DURATION: 88 MS
EKG QTC CALCULATION (BAZETT): 424 MS
EKG R AXIS: -50 DEGREES
EKG T AXIS: 65 DEGREES
EKG VENTRICULAR RATE: 67 BPM

## 2022-07-17 LAB
ALPHA-TOCOPHEROL: 16.8 MG/L (ref 5.5–18)
GAMMA-TOCOPHEROL: 1.1 MG/L (ref 0–6)

## 2022-07-18 LAB
THYROGLOBULIN AB: 22 IU/ML (ref 0–40)
THYROID PEROXIDASE (TPO) AB: 5.3 IU/ML (ref 0–25)

## 2022-07-19 LAB
ANTICARDIOLIPIN IGA ANTIBODY: 1.4 APL (ref 0–14)
ANTICARDIOLIPIN IGG ANTIBODY: 1.4 GPL (ref 0–10)
CARDIOLIPIN AB IGM: 4.4 MPL (ref 0–10)
DILUTE RUSSELL VIPER VENOM TIME: NORMAL
INR BLD: 1
PARTIAL THROMBOPLASTIN TIME: 26.9 SEC (ref 20.5–30.5)
PROTHROMBIN TIME: 10.3 SEC (ref 9.1–12.3)

## 2022-07-24 ASSESSMENT — ENCOUNTER SYMPTOMS
SINUS PRESSURE: 0
TROUBLE SWALLOWING: 0
CHEST TIGHTNESS: 0
SHORTNESS OF BREATH: 0
RHINORRHEA: 0
COUGH: 0
FACIAL SWELLING: 0
SORE THROAT: 0
CHOKING: 0

## 2022-07-29 ENCOUNTER — PATIENT MESSAGE (OUTPATIENT)
Dept: FAMILY MEDICINE CLINIC | Age: 67
End: 2022-07-29

## 2022-07-29 NOTE — TELEPHONE ENCOUNTER
From: Joanna Session  To: Aldo Herrmann  Sent: 7/29/2022 1:34 PM EDT  Subject: Tests    I see all tests came up normal. If you are not concerned with blood pressure, glucose or any life threatening issues I'm not sure my appt next week is needed. The tingling still comes & goes but not as much. Not sure if any other tests would rule anything else out. Let me know as I'll cancel appt if not needed.  Dot

## 2022-10-25 ENCOUNTER — OFFICE VISIT (OUTPATIENT)
Dept: PRIMARY CARE CLINIC | Age: 67
End: 2022-10-25
Payer: MEDICARE

## 2022-10-25 ENCOUNTER — TELEPHONE (OUTPATIENT)
Dept: PRIMARY CARE CLINIC | Age: 67
End: 2022-10-25

## 2022-10-25 VITALS
WEIGHT: 154 LBS | BODY MASS INDEX: 24.12 KG/M2 | OXYGEN SATURATION: 99 % | TEMPERATURE: 98.4 F | SYSTOLIC BLOOD PRESSURE: 102 MMHG | HEART RATE: 71 BPM | DIASTOLIC BLOOD PRESSURE: 72 MMHG

## 2022-10-25 DIAGNOSIS — B37.0 ORAL THRUSH: Primary | ICD-10-CM

## 2022-10-25 DIAGNOSIS — B37.0 ORAL THRUSH: ICD-10-CM

## 2022-10-25 DIAGNOSIS — S03.00XS DISLOCATION OF TEMPOROMANDIBULAR JOINT, SEQUELA: ICD-10-CM

## 2022-10-25 DIAGNOSIS — J32.4 CHRONIC PANSINUSITIS: ICD-10-CM

## 2022-10-25 PROCEDURE — 99214 OFFICE O/P EST MOD 30 MIN: CPT | Performed by: FAMILY MEDICINE

## 2022-10-25 PROCEDURE — 1123F ACP DISCUSS/DSCN MKR DOCD: CPT | Performed by: FAMILY MEDICINE

## 2022-10-25 NOTE — PROGRESS NOTES
Subjective:  Rock Fabian presents for   Chief Complaint   Patient presents with    Zechariah Alstonal     Pt states that she has oral thrush. She has been feeling brining on her tongue, and would like some nystatin. 3-4 days had irritation    Had a head cold starting last week    Patient states she gets this all the time- but I don't see any mention in her chart for 15 months. .. No recent antibiotics          Patient Active Problem List   Diagnosis    Anxiety    Gastroesophageal reflux disease    Irritable bowel syndrome    Mitral valve prolapse    Asthma    Agoraphobia    Panic disorder         Objective:  Physical Exam   Vitals: Wt Readings from Last 3 Encounters:   10/25/22 154 lb (69.9 kg)   07/12/22 154 lb 9.6 oz (70.1 kg)   03/22/22 157 lb 6.4 oz (71.4 kg)     Ht Readings from Last 3 Encounters:   01/24/19 5' 7\" (1.702 m)     Body mass index is 24.12 kg/m². Vitals:    10/25/22 1325   BP: 102/72   Site: Left Upper Arm   Position: Sitting   Cuff Size: Medium Adult   Pulse: 71   Temp: 98.4 °F (36.9 °C)   SpO2: 99%   Weight: 154 lb (69.9 kg)       Constitutional: She is oriented to person, place, and time. She appears well-developed and well-nourished and in no acute distress. Answers all my questions appropriately. Head: Normocephalic and atraumatic. Throat: no erythema, tonsillar hypertrophy or exudate. No ulcerations noted. Lips/Teeth/Gums all appear normal there is no irritation of the buccal mucosa, lips  or oropharynx. Tongue - no significant irritation noted. Neck: Normal range of motion. Neck supple. No tracheal deviation present. No abnormal lymphadenopathy. No JVD noted. Carotids are clear bilaterally. No thyroid masses noted. Assessment:   Encounter Diagnosis   Name Primary? Oral thrush          Plan: This is a presumed oral thrush.   Very well could be just viral.    Patient wants tx, so I gave her nyastatuin    Medications Discontinued During This Encounter   Medication Reason    nystatin (MYCOSTATIN) 884205 UNIT/ML suspension REORDER     THE ABOVE NOTED DISCONTINUED MEDS MAY ONLY BE FROM 'CLEANING UP' THE MED LIST AND WERE NOT ACTUALLY CANCELED;  SEE CHART FOR DETAILS! Orders Placed This Encounter   Medications    nystatin (MYCOSTATIN) [id number] UNIT/ML suspension     Sig: Take 5 mLs by mouth 4 times daily for 14 days Retain in mouth as long as possible     Dispense:  280 mL     Refill:  0     No orders of the defined types were placed in this encounter. Return in about 2 weeks (around 11/8/2022). There are no Patient Instructions on file for this visit.   Follow up with your provider

## 2023-03-06 ENCOUNTER — TELEPHONE (OUTPATIENT)
Dept: FAMILY MEDICINE CLINIC | Age: 68
End: 2023-03-06

## 2023-03-06 NOTE — TELEPHONE ENCOUNTER
AKASH     Patient is contacting the office to update Kristine with what has been happening. Patient states that one week ago on Sunday to the [address] [place] [address] for thrush. Patient states that she was put on nystatin and was on it for 5 days and it didn't work. Patient states that she contacted the [facility] at was given Clotrimazole 10 mg  5x a day. Patient states that after this is gone if the Jace Booze is not gone the [facility] doctor is going to do blood work.

## 2023-03-07 ENCOUNTER — OFFICE VISIT (OUTPATIENT)
Dept: PRIMARY CARE CLINIC | Age: 68
End: 2023-03-07
Payer: MEDICARE

## 2023-03-07 ENCOUNTER — HOSPITAL ENCOUNTER (OUTPATIENT)
Age: 68
Setting detail: SPECIMEN
Discharge: HOME | End: 2023-03-07

## 2023-03-07 VITALS
BODY MASS INDEX: 24.12 KG/M2 | OXYGEN SATURATION: 99 % | TEMPERATURE: 98.5 F | HEART RATE: 81 BPM | SYSTOLIC BLOOD PRESSURE: 120 MMHG | WEIGHT: 154 LBS | DIASTOLIC BLOOD PRESSURE: 76 MMHG

## 2023-03-07 DIAGNOSIS — J02.9 SORE THROAT: Primary | ICD-10-CM

## 2023-03-07 DIAGNOSIS — J02.9 SORE THROAT: ICD-10-CM

## 2023-03-07 LAB — S PYO AG THROAT QL: NORMAL

## 2023-03-07 PROCEDURE — 1036F TOBACCO NON-USER: CPT | Performed by: PHYSICIAN ASSISTANT

## 2023-03-07 PROCEDURE — G8420 CALC BMI NORM PARAMETERS: HCPCS | Performed by: PHYSICIAN ASSISTANT

## 2023-03-07 PROCEDURE — 3017F COLORECTAL CA SCREEN DOC REV: CPT | Performed by: PHYSICIAN ASSISTANT

## 2023-03-07 PROCEDURE — 87880 STREP A ASSAY W/OPTIC: CPT | Performed by: PHYSICIAN ASSISTANT

## 2023-03-07 PROCEDURE — 1123F ACP DISCUSS/DSCN MKR DOCD: CPT | Performed by: PHYSICIAN ASSISTANT

## 2023-03-07 PROCEDURE — 1090F PRES/ABSN URINE INCON ASSESS: CPT | Performed by: PHYSICIAN ASSISTANT

## 2023-03-07 PROCEDURE — 99213 OFFICE O/P EST LOW 20 MIN: CPT | Performed by: PHYSICIAN ASSISTANT

## 2023-03-07 PROCEDURE — G8427 DOCREV CUR MEDS BY ELIG CLIN: HCPCS | Performed by: PHYSICIAN ASSISTANT

## 2023-03-07 PROCEDURE — G8400 PT W/DXA NO RESULTS DOC: HCPCS | Performed by: PHYSICIAN ASSISTANT

## 2023-03-07 PROCEDURE — G8484 FLU IMMUNIZE NO ADMIN: HCPCS | Performed by: PHYSICIAN ASSISTANT

## 2023-03-07 RX ORDER — PANTOPRAZOLE SODIUM 40 MG/1
TABLET, DELAYED RELEASE ORAL
COMMUNITY

## 2023-03-07 RX ORDER — ALBUTEROL SULFATE 90 UG/1
AEROSOL, METERED RESPIRATORY (INHALATION)
COMMUNITY

## 2023-03-07 RX ORDER — ZOLPIDEM TARTRATE 5 MG/1
TABLET ORAL
COMMUNITY
Start: 2018-11-01

## 2023-03-07 RX ORDER — FLUCONAZOLE 150 MG/1
150 TABLET ORAL
Qty: 2 TABLET | Refills: 0 | Status: SHIPPED | OUTPATIENT
Start: 2023-03-07 | End: 2023-03-13

## 2023-03-07 RX ORDER — FLUCONAZOLE 150 MG/1
150 TABLET ORAL DAILY
Qty: 3 TABLET | Refills: 0 | Status: CANCELLED | OUTPATIENT
Start: 2023-03-07 | End: 2023-03-10

## 2023-03-07 RX ORDER — CLOTRIMAZOLE 10 MG/1
10 LOZENGE ORAL; TOPICAL
COMMUNITY

## 2023-03-07 RX ORDER — ESTRADIOL 10 UG/1
TABLET VAGINAL
COMMUNITY
Start: 2023-01-05

## 2023-03-07 ASSESSMENT — ENCOUNTER SYMPTOMS
COUGH: 0
GASTROINTESTINAL NEGATIVE: 1
SWOLLEN GLANDS: 0
SORE THROAT: 1
EYES NEGATIVE: 1

## 2023-03-07 NOTE — PROGRESS NOTES
[facility] [address]  [address] [place] [address]  Phone: [phone]  Fax: [place]    Pt Name: Jonnathan Pedraza  MRN: [id number]  [place] 1955  Date of evaluation: 3/7/2023  Provider: Marie José PA-C     CHIEF COMPLAINT       Chief Complaint   Patient presents with    Other     Pt states that she has oral thrush, was treated with nystatin/clotriamazole troch tablets(started 3/3) on 2/26 at the urgent care and she states that it didn't take care of it. Pharyngitis           HISTORY OF PRESENT ILLNESS  (Location/Symptom, Timing/Onset, Context/Setting, Quality, Duration, Modifying Factors, Severity.)   Jonnathan Pedraza is a 79 y.o. White (non-) [1] female who presents to the office for evaluation of      Patient states she was treated for oral thrush     Pharyngitis  This is a new (oral thrush) problem. The current episode started 1 to 4 weeks ago. The problem has been waxing and waning. Associated symptoms include fatigue and a sore throat. Pertinent negatives include no congestion, coughing or swollen glands. Associated symptoms comments: Patient states that her tongue is burning . The symptoms are aggravated by drinking, eating and swallowing. Treatments tried: nystatin and clotrimazole. The treatment provided mild relief. Nursing Notes were reviewed. REVIEW OF SYSTEMS    (2-9 systems for level 4, 10 or more for level 5)     Review of Systems   Constitutional:  Positive for fatigue. HENT:  Positive for postnasal drip and sore throat. Negative for congestion, ear discharge and ear pain. Eyes: Negative. Respiratory:  Negative for cough. Cardiovascular: Negative. Gastrointestinal: Negative. Genitourinary: Negative. Musculoskeletal: Negative. Skin: Negative. Except as noted above the remainder of the review of systems was reviewed andnegative. PAST MEDICAL HISTORY   History reviewed.     Past Medical History:   Diagnosis Date    Acid reflux     Agoraphobia     Asthma     Herpes     Irritable bowel syndrome     Mitral valve prolapse          SURGICAL HISTORY     History reviewed. Past Surgical History:   Procedure Laterality Date    BLADDER REPAIR       SECTION      CHOLECYSTECTOMY           CURRENT MEDICATIONS       Current Outpatient Medications   Medication Sig Dispense Refill    albuterol sulfate HFA (PROVENTIL;VENTOLIN;PROAIR) 108 (90 Base) MCG/ACT inhaler 2 puffs as needed Inhalation every 4 hrs for 16      estrogens conjugated (PREMARIN) 0.625 MG/GM CREA vaginal cream Apply pea sized amount to vulva q hs prn      YUVAFEM 10 MCG TABS vaginal tablet       nystatin (MYCOSTATIN) [id number] UNIT/ML suspension 4 ml Mouth/Throat Four times a day for 15 days      pantoprazole (PROTONIX) 40 MG tablet 1 tablet Orally Once a day for 30 day(s)      zolpidem (AMBIEN) 5 MG tablet 1 tablet at bedtime Orally Once a day for 30 days      clotrimazole (MYCELEX) 10 MG mona Take 10 mg by mouth 5 times daily Started on Friday 3/3.      fluconazole (DIFLUCAN) 150 MG tablet Take 1 tablet by mouth every 72 hours for 6 days 2 tablet 0    Estradiol Starter Pack (IMVEXXY STARTER PACK) 10 MCG INST Place vaginally 10 mcg 2 vaginal insert      NONFORMULARY CBD as needed. omeprazole (PRILOSEC) 20 MG delayed release capsule TAKE 1 CAPSULE DAILY 90 capsule 3    fluticasone (FLONASE) 50 MCG/ACT nasal spray 1 spray by Each Nostril route daily 3 each 3    Multiple Vitamin (MULTIVITAMIN ADULT PO) Take by mouth      Probiotic Acidophilus (FLORANEX) TABS Take 1 tablet by mouth daily      fluticasone (FLONASE) 50 MCG/ACT nasal spray daily as needed (Patient not taking: No sig reported)      clonazePAM (KLONOPIN) 0.5 MG tablet Take 1 tablet by mouth 2 times daily as needed for Anxiety for up to 30 days. 14 tablet 0     No current facility-administered medications for this visit.          ALLERGIES     Sulfa antibiotics, Nitrofurantoin, and Robitussin (alcohol free) [guaifenesin]    FAMILY HISTORY     No family history on file. No family status information on file. SOCIAL HISTORY      reports that she has never smoked. She has never used smokeless tobacco. She reports that she does not drink alcohol and does not use drugs. PHYSICAL EXAM    (up to 7 for level 4, 8 or more for level 5)     Vitals:    03/07/23 1222   BP: 120/76   Site: Left Upper Arm   Position: Sitting   Cuff Size: Medium Adult   Pulse: 81   Temp: 98.5 °F (36.9 °C)   SpO2: 99%   Weight: 154 lb (69.9 kg)         Physical Exam  Vitals and nursing note reviewed. Constitutional:       General: She is not in acute distress. Appearance: Normal appearance. HENT:      Head: Normocephalic and atraumatic. Right Ear: External ear normal.      Left Ear: External ear normal.      Nose: Nose normal.      Mouth/Throat:      Mouth: Mucous membranes are moist.      Pharynx: Posterior oropharyngeal erythema present. Eyes:      Extraocular Movements: Extraocular movements intact. Conjunctiva/sclera: Conjunctivae normal.      Pupils: Pupils are equal, round, and reactive to light. Pulmonary:      Effort: Pulmonary effort is normal.   Abdominal:      Palpations: Abdomen is soft. Skin:     General: Skin is warm and dry. Neurological:      Mental Status: She is alert and oriented to person, place, and time. DIFFERENTIAL DIAGNOSIS:       Lisa Candelaria reviewed the disposition diagnosis with the patient and or their family/guardian. I have answered their questions and given discharge instructions. They voiced understanding of these instructions and did not have anyfurther questions or complaints. PROCEDURES:  Orders Placed This Encounter   Procedures    POCT rapid strep A         No results found for this visit on 03/07/23.     FINALIMPRESSION      Visit Diagnoses and Associated Orders       Sore throat    -  Primary    POCT rapid strep A [99639 Custom]           ORDERS WITHOUT AN ASSOCIATED DIAGNOSIS    albuterol sulfate HFA (PROVENTIL;VENTOLIN;PROAIR) 108 (90 Base) MCG/ACT inhaler [92705]      estrogens conjugated (PREMARIN) 0.625 MG/GM CREA vaginal cream [826982]      YUVAFEM 10 MCG TABS vaginal tablet [[id number]]      nystatin (MYCOSTATIN) 427483 UNIT/ML suspension [5751]      pantoprazole (PROTONIX) 40 MG tablet [31481]      zolpidem (AMBIEN) 5 MG tablet [12932]      clotrimazole (MYCELEX) 10 MG mona [9644]      fluconazole (DIFLUCAN) 150 MG tablet [97612]                PLAN     Return if symptoms worsen or fail to improve. DISCHARGEMEDICATIONS:  Orders Placed This Encounter   Medications    fluconazole (DIFLUCAN) 150 MG tablet     Sig: Take 1 tablet by mouth every 72 hours for 6 days     Dispense:  2 tablet     Refill:  0           Plan:  Specimen sent for a culture. Possible treatment alteration based on the results. Take medication as prescribed     Tylenol/Motrin as needed for fever/discomfort. Salt water gargles and throat lozenges if desired. Change toothbrush in 24 hours. Patient agreeable to treatment plan. Educational materials provided on AVS.  Follow up if symptoms do not improve/worsen. Patient instructed to return to the office if symptoms worsen, return, or have any other concerns. Patient understands and is agreeable.          Ana Luisa Casey PA-C 3/7/2023 1:01 PM

## 2023-03-08 LAB
MICROORGANISM/AGENT SPEC: NORMAL
SPECIMEN DESCRIPTION: NORMAL

## 2023-03-08 NOTE — TELEPHONE ENCOUNTER
[facility] requested. Routine appointment scheduled. Patient also seen at walk in clinic today.

## 2023-03-15 ENCOUNTER — OFFICE VISIT (OUTPATIENT)
Dept: PRIMARY CARE CLINIC | Age: 68
End: 2023-03-15
Payer: MEDICARE

## 2023-03-15 VITALS
BODY MASS INDEX: 24.92 KG/M2 | DIASTOLIC BLOOD PRESSURE: 78 MMHG | SYSTOLIC BLOOD PRESSURE: 124 MMHG | HEIGHT: 67 IN | TEMPERATURE: 97.8 F | OXYGEN SATURATION: 99 % | WEIGHT: 158.8 LBS | HEART RATE: 84 BPM

## 2023-03-15 DIAGNOSIS — B37.0 THRUSH, ORAL: Primary | ICD-10-CM

## 2023-03-15 DIAGNOSIS — K21.9 GASTROESOPHAGEAL REFLUX DISEASE WITHOUT ESOPHAGITIS: ICD-10-CM

## 2023-03-15 DIAGNOSIS — J02.9 SORE THROAT: ICD-10-CM

## 2023-03-15 PROCEDURE — 3017F COLORECTAL CA SCREEN DOC REV: CPT | Performed by: NURSE PRACTITIONER

## 2023-03-15 PROCEDURE — G8484 FLU IMMUNIZE NO ADMIN: HCPCS | Performed by: NURSE PRACTITIONER

## 2023-03-15 PROCEDURE — G8427 DOCREV CUR MEDS BY ELIG CLIN: HCPCS | Performed by: NURSE PRACTITIONER

## 2023-03-15 PROCEDURE — G8420 CALC BMI NORM PARAMETERS: HCPCS | Performed by: NURSE PRACTITIONER

## 2023-03-15 PROCEDURE — 1123F ACP DISCUSS/DSCN MKR DOCD: CPT | Performed by: NURSE PRACTITIONER

## 2023-03-15 PROCEDURE — G8400 PT W/DXA NO RESULTS DOC: HCPCS | Performed by: NURSE PRACTITIONER

## 2023-03-15 PROCEDURE — 1090F PRES/ABSN URINE INCON ASSESS: CPT | Performed by: NURSE PRACTITIONER

## 2023-03-15 PROCEDURE — 99214 OFFICE O/P EST MOD 30 MIN: CPT | Performed by: NURSE PRACTITIONER

## 2023-03-15 PROCEDURE — 1036F TOBACCO NON-USER: CPT | Performed by: NURSE PRACTITIONER

## 2023-03-15 RX ORDER — PANTOPRAZOLE SODIUM 20 MG/1
20 TABLET, DELAYED RELEASE ORAL
Qty: 90 TABLET | Refills: 0 | Status: SHIPPED | OUTPATIENT
Start: 2023-03-15 | End: 2023-06-13

## 2023-03-15 RX ORDER — FLUCONAZOLE 100 MG/1
200 TABLET ORAL DAILY
Qty: 28 TABLET | Refills: 0 | Status: SHIPPED | OUTPATIENT
Start: 2023-03-15 | End: 2023-03-29

## 2023-03-15 ASSESSMENT — ENCOUNTER SYMPTOMS
ABDOMINAL PAIN: 0
CONSTIPATION: 0
RHINORRHEA: 0
TROUBLE SWALLOWING: 0
SHORTNESS OF BREATH: 0
DIARRHEA: 0
NAUSEA: 0
SORE THROAT: 1
VOICE CHANGE: 0
COUGH: 1
VOMITING: 0

## 2023-03-15 NOTE — PROGRESS NOTES
[address] WALK-IN  [address] [facility],  [address]  [address] [facility] [address]  Dept: [address] [facility] Ayanna Robin is a 76 y.o. female Established patient, who presents to the walk-in clinic today with conditions/complaints as noted below:    Chief Complaint   Patient presents with    Pharyngitis     Sore throat , burning tongue started yesterday. Was seen at [facility] walk in for thrush last week. Difficulty swallowing . HPI:     Patient presents to walk in clinic with concerns about recurring sore throat and oral thrush. Went to [address] [place] [address] on 2/26. Prescribed Nystatin, then clotrimazole lozenge when Nystatin wasn't helping. No better so went to [facility] walk in clinic on 3/7 and then prescribed oral Diflucan x2 days  by 3 days. Got better for like one day and symptoms returned. Has tongue burning and throat hurting. Checked for strep last week and negative. Ate some acidic foods that caused horrible acid reflux which caused first attack. Looks like canker sores in her mouth. Roof of her mouth looks yellow/white. She is very frustrated she keeps having to go to urgent care and walk in clinics. Has tried to make appt with PCP but no available openings. Tells me she has had recurring oral thrush for few years following a dental procedure. Has had numerous labs checked including HIV and all normal. Has seen ENT and no answers. Pharyngitis  This is a recurrent problem. Associated symptoms include coughing (slight tickle from throat) and a sore throat. Pertinent negatives include no abdominal pain, chest pain, congestion, fever, headaches, nausea, rash or vomiting.      Past Medical History:   Diagnosis Date    Acid reflux     Agoraphobia     Asthma     Herpes     Irritable bowel syndrome     Mitral valve prolapse        Current Outpatient Medications   Medication Sig Dispense Refill    pantoprazole (PROTONIX) 20 MG tablet Take 1 tablet by mouth every morning (before breakfast) 90 tablet 0    fluconazole (DIFLUCAN) 100 MG tablet Take 2 tablets by mouth daily for 14 days 28 tablet 0    albuterol sulfate HFA (PROVENTIL;VENTOLIN;PROAIR) 108 (90 Base) MCG/ACT inhaler 2 puffs as needed Inhalation every 4 hrs for 16      estrogens conjugated (PREMARIN) 0.625 MG/GM CREA vaginal cream Apply pea sized amount to vulva q hs prn      YUVAFEM 10 MCG TABS vaginal tablet       nystatin (MYCOSTATIN) [id number] UNIT/ML suspension 4 ml Mouth/Throat Four times a day for 15 days      zolpidem (AMBIEN) 5 MG tablet 1 tablet at bedtime Orally Once a day for 30 days      clotrimazole (MYCELEX) 10 MG mona Take 10 mg by mouth 5 times daily Started on Friday 3/3.      fluticasone (FLONASE) 50 MCG/ACT nasal spray 1 spray by Each Nostril route daily 3 each 3    clonazePAM (KLONOPIN) 0.5 MG tablet Take 1 tablet by mouth 2 times daily as needed for Anxiety for up to 30 days. 14 tablet 0    Multiple Vitamin (MULTIVITAMIN ADULT PO) Take by mouth      Probiotic Acidophilus (FLORANEX) TABS Take 1 tablet by mouth daily      Estradiol Starter Pack (IMVEXXY STARTER PACK) 10 MCG INST Place vaginally 10 mcg 2 vaginal insert (Patient not taking: Reported on 3/15/2023)      NONFORMULARY CBD as needed. fluticasone (FLONASE) 50 MCG/ACT nasal spray daily as needed (Patient not taking: No sig reported)       No current facility-administered medications for this visit. Allergies   Allergen Reactions    Sulfa Antibiotics      Unsure of reaction    Nitrofurantoin Nausea And Vomiting     Macrobid    Robitussin (Alcohol Free) [Guaifenesin] Hives and Rash       :     Review of Systems   Constitutional:  Positive for activity change and appetite change (living on soup and crackers). Negative for fever. HENT:  Positive for postnasal drip (doesn't feel PND but keeps getting told she has it) and sore throat.  Negative for congestion, ear pain, rhinorrhea, trouble swallowing and voice change. Eyes:  Negative for visual disturbance. Respiratory:  Positive for cough (slight tickle from throat). Negative for shortness of breath. Cardiovascular:  Negative for chest pain. Gastrointestinal:  Negative for abdominal pain, constipation, diarrhea, nausea and vomiting. Takes omeprazole before bed. Thinks she needs to switch to Protonix. Skin:  Negative for rash. Neurological:  Negative for headaches. Psychiatric/Behavioral:  Negative for sleep disturbance.      :     /78   Pulse 84   Temp 97.8 °F (36.6 °C)   Ht 5' 7\" (1.702 m)   Wt 158 lb 12.8 oz (72 kg)   SpO2 99%   BMI 24.87 kg/m²     Physical Exam  Vitals and nursing note reviewed. Constitutional:       General: She is not in acute distress. Appearance: Normal appearance. She is not ill-appearing. HENT:      Head: Normocephalic and atraumatic. Right Ear: Tympanic membrane and external ear normal.      Left Ear: Tympanic membrane and external ear normal.      Nose: Nose normal. No congestion. Mouth/Throat:      Lips: Pink. No lesions. Mouth: Mucous membranes are moist. No oral lesions. Tongue: No lesions. Palate: No lesions. Pharynx: Oropharynx is clear. No pharyngeal swelling, oropharyngeal exudate or posterior oropharyngeal erythema. Eyes:      General:         Right eye: No discharge. Left eye: No discharge. Conjunctiva/sclera: Conjunctivae normal.   Cardiovascular:      Rate and Rhythm: Normal rate and regular rhythm. Pulmonary:      Effort: Pulmonary effort is normal. No respiratory distress. Breath sounds: Normal breath sounds. No wheezing or rhonchi. Skin:     General: Skin is warm and dry. Neurological:      Mental Status: She is alert and oriented to person, place, and time. Psychiatric:         Mood and Affect: Mood normal.         Behavior: Behavior normal.         :          1. Thrush, oral  -     fluconazole (DIFLUCAN) 100 MG tablet;  Take 2 tablets by mouth daily for 14 days, Disp-28 tablet, R-0Normal  -     Hepatic Function Panel; Future  2. Sore throat  3. Gastroesophageal reflux disease without esophagitis  -     pantoprazole (PROTONIX) 20 MG tablet; Take 1 tablet by mouth every morning (before breakfast), Disp-90 tablet, R-0Normal     Diflucan daily x2 weeks  Will check liver enzymes. Stop omeprazole and will trial Protonix. Has been taking PPI at night and advised to take in morning before breakfast.   Encouraged daily prebiotic. Monitor for worsening symptoms. Recommend follow up in 3 weeks. If she cannot see PCP, I will be happy to see her back in walk in clinic. Call office with concerns.     :      Return in about 3 weeks (around 4/5/2023), or if symptoms worsen or fail to improve. Orders Placed This Encounter   Medications    pantoprazole (PROTONIX) 20 MG tablet     Sig: Take 1 tablet by mouth every morning (before breakfast)     Dispense:  90 tablet     Refill:  0    fluconazole (DIFLUCAN) 100 MG tablet     Sig: Take 2 tablets by mouth daily for 14 days     Dispense:  28 tablet     Refill:  0             Patient and/or parent given educational materials - see patient instructions. Discussed use, benefit, and side effects of prescribed medications. All patient questions answered. Patient and/or parent voiced understanding.       Electronically signed by MARIA DE JESUS Mcmillan 3/15/2023 at 1:17 PM

## 2023-04-07 ENCOUNTER — OFFICE VISIT (OUTPATIENT)
Dept: PRIMARY CARE CLINIC | Age: 68
End: 2023-04-07
Payer: MEDICARE

## 2023-04-07 ENCOUNTER — HOSPITAL ENCOUNTER (OUTPATIENT)
Age: 68
Setting detail: SPECIMEN
Discharge: HOME | End: 2023-04-07

## 2023-04-07 VITALS
SYSTOLIC BLOOD PRESSURE: 124 MMHG | TEMPERATURE: 98.2 F | HEIGHT: 67 IN | DIASTOLIC BLOOD PRESSURE: 78 MMHG | BODY MASS INDEX: 25.27 KG/M2 | WEIGHT: 161 LBS | OXYGEN SATURATION: 100 % | HEART RATE: 88 BPM

## 2023-04-07 DIAGNOSIS — H61.91 DISORDER OF RIGHT EXTERNAL EAR: ICD-10-CM

## 2023-04-07 DIAGNOSIS — B37.0 THRUSH, ORAL: ICD-10-CM

## 2023-04-07 DIAGNOSIS — S46.811A STRAIN OF RIGHT TRAPEZIUS MUSCLE, INITIAL ENCOUNTER: ICD-10-CM

## 2023-04-07 DIAGNOSIS — G50.1 ATYPICAL FACE PAIN: Primary | ICD-10-CM

## 2023-04-07 PROCEDURE — 99213 OFFICE O/P EST LOW 20 MIN: CPT

## 2023-04-07 PROCEDURE — 3017F COLORECTAL CA SCREEN DOC REV: CPT

## 2023-04-07 PROCEDURE — 1123F ACP DISCUSS/DSCN MKR DOCD: CPT

## 2023-04-07 PROCEDURE — G8417 CALC BMI ABV UP PARAM F/U: HCPCS

## 2023-04-07 PROCEDURE — G8400 PT W/DXA NO RESULTS DOC: HCPCS

## 2023-04-07 PROCEDURE — 4130F TOPICAL PREP RX AOE: CPT

## 2023-04-07 PROCEDURE — G8427 DOCREV CUR MEDS BY ELIG CLIN: HCPCS

## 2023-04-07 PROCEDURE — 1090F PRES/ABSN URINE INCON ASSESS: CPT

## 2023-04-07 PROCEDURE — 1036F TOBACCO NON-USER: CPT

## 2023-04-07 ASSESSMENT — ENCOUNTER SYMPTOMS
COUGH: 0
BACK PAIN: 0
FACIAL SWELLING: 0
SORE THROAT: 0
SINUS PRESSURE: 0
EYE DISCHARGE: 0
SHORTNESS OF BREATH: 0

## 2023-04-07 NOTE — PATIENT INSTRUCTIONS
Follow-up with Allergist as scheduled. Use Tylenol or Motrin as needed for pain/discomfort. Recommend ice/heat application for acute relief. Trial topical Voltaren gel for muscle pain. Follow-up with PCP.

## 2023-04-07 NOTE — PROGRESS NOTES
neck pain. Skin:  Negative for rash. Neurological:  Negative for weakness and numbness.     :     /78   Pulse 88   Temp 98.2 °F (36.8 °C)   Ht 5' 7\" (1.702 m)   Wt 161 lb (73 kg)   SpO2 100%   BMI 25.22 kg/m²     Physical Exam  Vitals reviewed. Constitutional:       General: She is not in acute distress. Appearance: Normal appearance. HENT:      Head: Normocephalic and atraumatic. Jaw: There is normal jaw occlusion. No tenderness, swelling or pain on movement. Right Ear: Tympanic membrane, ear canal and external ear normal. Tenderness (tragus) present. No drainage or swelling. Left Ear: Tympanic membrane, ear canal and external ear normal.      Nose: Nose normal.      Mouth/Throat:      Mouth: Mucous membranes are moist.      Pharynx: Oropharynx is clear. Eyes:      Conjunctiva/sclera: Conjunctivae normal.   Cardiovascular:      Rate and Rhythm: Normal rate and regular rhythm. Heart sounds: Normal heart sounds. Pulmonary:      Effort: Pulmonary effort is normal.      Breath sounds: Normal breath sounds. Musculoskeletal:      Right shoulder: No swelling, deformity, tenderness or crepitus. Normal range of motion. Arms:       Cervical back: Normal range of motion and neck supple. No tenderness. Comments: Mild trapezial tenderness, no swelling, erythema, or bruising   Lymphadenopathy:      Cervical: No cervical adenopathy. Skin:     General: Skin is warm and dry. Capillary Refill: Capillary refill takes less than 2 seconds. Findings: No rash. Neurological:      Mental Status: She is alert and oriented to person, place, and time. Psychiatric:         Mood and Affect: Mood normal.         Behavior: Behavior normal.         :          1. Atypical face pain  2. Disorder of right external ear  3. Strain of right trapezius muscle, initial encounter       :      Return if symptoms worsen or fail to improve.     Orders Placed This Encounter

## 2023-04-08 LAB
ALBUMIN SERPL-MCNC: 4.6 G/DL (ref 3.5–5.2)
ALBUMIN/GLOBULIN RATIO: 1.7 (ref 1–2.5)
ALP SERPL-CCNC: 82 U/L (ref 35–104)
ALT SERPL-CCNC: 20 U/L (ref 5–33)
AST SERPL-CCNC: 20 U/L
BILIRUB DIRECT SERPL-MCNC: 0.2 MG/DL
BILIRUB INDIRECT SERPL-MCNC: 0.4 MG/DL (ref 0–1)
BILIRUB SERPL-MCNC: 0.6 MG/DL (ref 0.3–1.2)
PROT SERPL-MCNC: 7.3 G/DL (ref 6.4–8.3)

## 2023-05-13 SDOH — ECONOMIC STABILITY: FOOD INSECURITY: WITHIN THE PAST 12 MONTHS, YOU WORRIED THAT YOUR FOOD WOULD RUN OUT BEFORE YOU GOT MONEY TO BUY MORE.: PATIENT DECLINED

## 2023-05-13 SDOH — ECONOMIC STABILITY: FOOD INSECURITY: WITHIN THE PAST 12 MONTHS, THE FOOD YOU BOUGHT JUST DIDN'T LAST AND YOU DIDN'T HAVE MONEY TO GET MORE.: PATIENT DECLINED

## 2023-05-13 SDOH — ECONOMIC STABILITY: INCOME INSECURITY: HOW HARD IS IT FOR YOU TO PAY FOR THE VERY BASICS LIKE FOOD, HOUSING, MEDICAL CARE, AND HEATING?: PATIENT DECLINED

## 2023-05-13 SDOH — ECONOMIC STABILITY: TRANSPORTATION INSECURITY
IN THE PAST 12 MONTHS, HAS LACK OF TRANSPORTATION KEPT YOU FROM MEETINGS, WORK, OR FROM GETTING THINGS NEEDED FOR DAILY LIVING?: PATIENT DECLINED

## 2023-05-13 SDOH — ECONOMIC STABILITY: HOUSING INSECURITY
IN THE LAST 12 MONTHS, WAS THERE A TIME WHEN YOU DID NOT HAVE A STEADY PLACE TO SLEEP OR SLEPT IN A SHELTER (INCLUDING NOW)?: PATIENT REFUSED

## 2023-05-15 ENCOUNTER — OFFICE VISIT (OUTPATIENT)
Dept: FAMILY MEDICINE CLINIC | Age: 68
End: 2023-05-15
Payer: MEDICARE

## 2023-05-15 VITALS
SYSTOLIC BLOOD PRESSURE: 120 MMHG | BODY MASS INDEX: 25.56 KG/M2 | RESPIRATION RATE: 18 BRPM | WEIGHT: 163.2 LBS | DIASTOLIC BLOOD PRESSURE: 78 MMHG | HEART RATE: 87 BPM | OXYGEN SATURATION: 98 % | TEMPERATURE: 97.2 F

## 2023-05-15 DIAGNOSIS — F51.04 PSYCHOPHYSIOLOGICAL INSOMNIA: ICD-10-CM

## 2023-05-15 DIAGNOSIS — F41.9 ANXIETY: Primary | ICD-10-CM

## 2023-05-15 DIAGNOSIS — Z86.19 FREQUENT INFECTIONS: ICD-10-CM

## 2023-05-15 DIAGNOSIS — J45.909 UNCOMPLICATED ASTHMA, UNSPECIFIED ASTHMA SEVERITY, UNSPECIFIED WHETHER PERSISTENT: ICD-10-CM

## 2023-05-15 DIAGNOSIS — B37.0 ORAL THRUSH: ICD-10-CM

## 2023-05-15 DIAGNOSIS — K13.3 HAIRY LEUKOPLAKIA OF TONGUE: ICD-10-CM

## 2023-05-15 PROCEDURE — 1123F ACP DISCUSS/DSCN MKR DOCD: CPT | Performed by: NURSE PRACTITIONER

## 2023-05-15 PROCEDURE — 1036F TOBACCO NON-USER: CPT | Performed by: NURSE PRACTITIONER

## 2023-05-15 PROCEDURE — G8427 DOCREV CUR MEDS BY ELIG CLIN: HCPCS | Performed by: NURSE PRACTITIONER

## 2023-05-15 PROCEDURE — 3017F COLORECTAL CA SCREEN DOC REV: CPT | Performed by: NURSE PRACTITIONER

## 2023-05-15 PROCEDURE — G8400 PT W/DXA NO RESULTS DOC: HCPCS | Performed by: NURSE PRACTITIONER

## 2023-05-15 PROCEDURE — 1090F PRES/ABSN URINE INCON ASSESS: CPT | Performed by: NURSE PRACTITIONER

## 2023-05-15 PROCEDURE — G8417 CALC BMI ABV UP PARAM F/U: HCPCS | Performed by: NURSE PRACTITIONER

## 2023-05-15 PROCEDURE — 99215 OFFICE O/P EST HI 40 MIN: CPT | Performed by: NURSE PRACTITIONER

## 2023-05-15 RX ORDER — DEXAMETHASONE 1 MG
1 TABLET ORAL ONCE
Qty: 1 TABLET | Refills: 0 | Status: SHIPPED | OUTPATIENT
Start: 2023-05-15 | End: 2023-05-15

## 2023-05-15 RX ORDER — AZELASTINE HYDROCHLORIDE 137 UG/1
SPRAY, METERED NASAL
COMMUNITY
Start: 2023-04-12

## 2023-05-15 RX ORDER — ALBUTEROL SULFATE 90 UG/1
AEROSOL, METERED RESPIRATORY (INHALATION)
Qty: 18 G | Refills: 0 | Status: SHIPPED | OUTPATIENT
Start: 2023-05-15

## 2023-05-15 RX ORDER — OMEPRAZOLE 20 MG/1
20 CAPSULE, DELAYED RELEASE ORAL DAILY
COMMUNITY
Start: 2023-04-04

## 2023-05-15 ASSESSMENT — ENCOUNTER SYMPTOMS
COUGH: 0
BACK PAIN: 0
DIARRHEA: 0
BLOOD IN STOOL: 0
CONSTIPATION: 0
CHEST TIGHTNESS: 0
SHORTNESS OF BREATH: 0
ABDOMINAL PAIN: 0
ABDOMINAL DISTENTION: 0

## 2023-05-15 ASSESSMENT — PATIENT HEALTH QUESTIONNAIRE - PHQ9
1. LITTLE INTEREST OR PLEASURE IN DOING THINGS: 1
2. FEELING DOWN, DEPRESSED OR HOPELESS: 0
SUM OF ALL RESPONSES TO PHQ9 QUESTIONS 1 & 2: 1
SUM OF ALL RESPONSES TO PHQ QUESTIONS 1-9: 1

## 2023-05-15 NOTE — PROGRESS NOTES
[address]  [phone]    5/15/23     Patient ID  Ric Michelle is a 76 y.o. female  Established patient    Chief Complaint  Ric Michelle presents today for Anxiety (Increased within the last 6 months. ) and Thrush (Last flare up in february and stopped beginning of April. She would like to discuss having something called in when her sx start so she does not have to wait for treatments and different walk in evaluations. )    Have you seen any other physician or provider since your last visit? Yes - Records Obtained OBGYN, Walk in clinic, Urgent care   Have you had any other diagnostic tests since your last visit? Yes - Records Obtained ECHO  Have you been seen in the emergency room and/or had an admission to a hospital since we last saw you? No     ASSESSMENT/PLAN  1. Anxiety  -     Cortisol Total; Future  -     dexamethasone (DECADRON) 1 MG tablet; Take 1 tablet by mouth once for 1 dose Take between 11 pm and 12 midnight. Complete cortisol lab next morning between 7 am and 9 am, Disp-1 tablet, R-0Normal  -     Dexamethasone; Future  2. Psychophysiological insomnia  -     Cortisol Total; Future  -     dexamethasone (DECADRON) 1 MG tablet; Take 1 tablet by mouth once for 1 dose Take between 11 pm and 12 midnight. Complete cortisol lab next morning between 7 am and 9 am, Disp-1 tablet, R-0Normal  -     Dexamethasone; Future  3. Frequent infections  -     Cortisol Total; Future  -     dexamethasone (DECADRON) 1 MG tablet; Take 1 tablet by mouth once for 1 dose Take between 11 pm and 12 midnight. Complete cortisol lab next morning between 7 am and 9 am, Disp-1 tablet, R-0Normal  -     Dexamethasone; Future  4. Hairy leukoplakia of tongue  -     nystatin (MYCOSTATIN) 521977 UNIT/ML suspension;  Take 5 mLs by mouth 4 times daily as needed (thrush), Oral, 4 TIMES DAILY PRN Starting Mon 5/15/2023, Until Tue 5/14/2024 at 2359, For 365 days, Disp-60 mL,

## 2023-05-23 RX ORDER — OMEPRAZOLE 20 MG/1
CAPSULE, DELAYED RELEASE ORAL
Qty: 90 CAPSULE | Refills: 1 | Status: SHIPPED | OUTPATIENT
Start: 2023-05-23 | End: 2024-05-23

## 2023-05-23 NOTE — TELEPHONE ENCOUNTER
LOV 5/15/23  RTO 4 weeks; F/U scheduled  [facility] 5/31/22    Health Maintenance   Topic Date Due    Pneumococcal 65+ years Vaccine (1 - PCV) Never done    Colorectal Cancer Screen  Never done    Breast cancer screen  Never done    Shingles vaccine (1 of 2) Never done    DEXA (modify frequency per FRAX score)  Never done    Annual Wellness Visit (AWV)  Never done    COVID-19 Vaccine (3 - Booster for Moderna series) 06/30/2021    Flu vaccine (Season Ended) 08/01/2023    Depression Screen  05/15/2024    Diabetes screen  08/13/2024    Lipids  08/13/2026    DTaP/Tdap/Td vaccine (2 - Td or Tdap) 09/28/2030    Hepatitis A vaccine  Aged Out    Hib vaccine  Aged Out    Meningococcal (ACWY) vaccine  Aged Out    Hepatitis C screen  Discontinued    HIV screen  Discontinued             (applicable per patient's age: Cancer Screenings, Depression Screening, Fall Risk Screening, Immunizations)    Hemoglobin A1C (%)   Date Value   12/14/2019 4.8     LDL Cholesterol (mg/dL)   Date Value   08/13/2021 160 (H)     AST (U/L)   Date Value   04/07/2023 20     ALT (U/L)   Date Value   04/07/2023 20     BUN (mg/dL)   Date Value   06/17/2022 14      (goal A1C is < 7)   (goal LDL is <100) need 30-50% reduction from baseline     BP Readings from Last 3 Encounters:   05/15/23 120/78   04/07/23 124/78   03/15/23 124/78    (goal /80)      All Future Testing planned in [facility]:  Lab Frequency Next Occurrence   Cortisol Total Once 05/16/2023   Dexamethasone Once 05/16/2023       Next Visit Date:  Future Appointments   Date Time Provider Leeann Dc   6/13/2023  2:30 PM Kristine Kearney, APRN - CNP [facility]            Patient Active Problem List:     Anxiety     Gastroesophageal reflux disease     Irritable bowel syndrome     Mitral valve prolapse     Asthma     Agoraphobia     Panic disorder

## 2023-06-13 ENCOUNTER — OFFICE VISIT (OUTPATIENT)
Dept: FAMILY MEDICINE CLINIC | Age: 68
End: 2023-06-13
Payer: MEDICARE

## 2023-06-13 VITALS
SYSTOLIC BLOOD PRESSURE: 120 MMHG | RESPIRATION RATE: 16 BRPM | OXYGEN SATURATION: 98 % | DIASTOLIC BLOOD PRESSURE: 80 MMHG | WEIGHT: 164 LBS | BODY MASS INDEX: 25.69 KG/M2 | HEART RATE: 68 BPM | TEMPERATURE: 97.2 F

## 2023-06-13 DIAGNOSIS — Z12.31 SCREENING MAMMOGRAM FOR BREAST CANCER: ICD-10-CM

## 2023-06-13 DIAGNOSIS — F41.9 ANXIETY: Primary | ICD-10-CM

## 2023-06-13 DIAGNOSIS — N89.8 VAGINAL DISCHARGE: ICD-10-CM

## 2023-06-13 DIAGNOSIS — R82.90 ABNORMAL URINALYSIS: ICD-10-CM

## 2023-06-13 LAB
BILIRUBIN, POC: NEGATIVE
BLOOD URINE, POC: ABNORMAL
CLARITY, POC: CLEAR
COLOR, POC: YELLOW
GLUCOSE URINE, POC: NEGATIVE
KETONES, POC: ABNORMAL
LEUKOCYTE EST, POC: NEGATIVE
NITRITE, POC: NEGATIVE
PH, POC: 5.5
PROTEIN, POC: NEGATIVE
SPECIFIC GRAVITY, POC: >=1.03
UROBILINOGEN, POC: 0.2

## 2023-06-13 PROCEDURE — 1090F PRES/ABSN URINE INCON ASSESS: CPT | Performed by: NURSE PRACTITIONER

## 2023-06-13 PROCEDURE — G8427 DOCREV CUR MEDS BY ELIG CLIN: HCPCS | Performed by: NURSE PRACTITIONER

## 2023-06-13 PROCEDURE — 3017F COLORECTAL CA SCREEN DOC REV: CPT | Performed by: NURSE PRACTITIONER

## 2023-06-13 PROCEDURE — 81002 URINALYSIS NONAUTO W/O SCOPE: CPT | Performed by: NURSE PRACTITIONER

## 2023-06-13 PROCEDURE — 99214 OFFICE O/P EST MOD 30 MIN: CPT | Performed by: NURSE PRACTITIONER

## 2023-06-13 PROCEDURE — 1123F ACP DISCUSS/DSCN MKR DOCD: CPT | Performed by: NURSE PRACTITIONER

## 2023-06-13 PROCEDURE — 1036F TOBACCO NON-USER: CPT | Performed by: NURSE PRACTITIONER

## 2023-06-13 PROCEDURE — G8417 CALC BMI ABV UP PARAM F/U: HCPCS | Performed by: NURSE PRACTITIONER

## 2023-06-13 PROCEDURE — G8400 PT W/DXA NO RESULTS DOC: HCPCS | Performed by: NURSE PRACTITIONER

## 2023-06-13 RX ORDER — BUSPIRONE HYDROCHLORIDE 5 MG/1
TABLET ORAL
Qty: 180 TABLET | Refills: 1 | Status: SHIPPED | OUTPATIENT
Start: 2023-06-13

## 2023-06-13 RX ORDER — CLONAZEPAM 0.5 MG/1
0.5 TABLET ORAL NIGHTLY PRN
Qty: 30 TABLET | Refills: 0 | Status: SHIPPED | OUTPATIENT
Start: 2023-06-13 | End: 2023-07-13

## 2023-06-16 ENCOUNTER — TELEPHONE (OUTPATIENT)
Dept: FAMILY MEDICINE CLINIC | Age: 68
End: 2023-06-16

## 2023-06-16 NOTE — TELEPHONE ENCOUNTER
Patient called stating when taking flagyl alone stomach is fine when she added Cipro to it she has nausea, patient also stated every time she's put on antibiotics she becomes sick and becomes dehydrated and ends up in er.patient asking for flagyl to be in cream or inserted cream.

## 2023-06-16 NOTE — TELEPHONE ENCOUNTER
Patient stated she was told she would have to be seen to get medication switched to a cream. Writer spoke with Mabel Esters stated patient doesn't need to be seen. Kristine stated if Berny was comfortable switching Flagyl to a cream that would be fine if not patient can stop Flagyl and start Cipro and be re-tested for BV when she comes in for a repeat urine. Writer spoke with Beverly Mcgee states he is okay with sending in a cream for BV.  Patient asked if rx could be sent to [facility] in [place]

## 2023-06-30 ENCOUNTER — HOSPITAL ENCOUNTER (OUTPATIENT)
Age: 68
Setting detail: SPECIMEN
Discharge: HOME | End: 2023-06-30

## 2023-06-30 DIAGNOSIS — R82.90 ABNORMAL URINALYSIS: ICD-10-CM

## 2023-07-01 LAB
MICROORGANISM SPEC CULT: NORMAL
SPECIMEN DESCRIPTION: NORMAL

## 2023-07-03 ASSESSMENT — ENCOUNTER SYMPTOMS
ABDOMINAL PAIN: 0
CONSTIPATION: 0
CHEST TIGHTNESS: 0
SHORTNESS OF BREATH: 0
ABDOMINAL DISTENTION: 0
DIARRHEA: 0

## 2023-07-10 DIAGNOSIS — R82.90 ABNORMAL URINALYSIS: Primary | ICD-10-CM

## 2023-07-19 ENCOUNTER — PATIENT MESSAGE (OUTPATIENT)
Dept: FAMILY MEDICINE CLINIC | Age: 68
End: 2023-07-19

## 2023-07-19 DIAGNOSIS — F41.9 ANXIETY: ICD-10-CM

## 2023-07-19 RX ORDER — CLONAZEPAM 0.5 MG/1
0.5 TABLET ORAL NIGHTLY PRN
Qty: 30 TABLET | Refills: 0 | Status: SHIPPED | OUTPATIENT
Start: 2023-07-19 | End: 2023-08-18

## 2023-07-19 NOTE — TELEPHONE ENCOUNTER
From: Roslyn Kumari  To: Sea Majano  Sent: 7/19/2023 12:42 PM EDT  Subject: Meds    Hi,  I've been doing good taking 5mg 2x day. I have been using klonopin 5mg at night. This seems to be going well. Running short on klonopin,,, if you're ok with this plan can you send a new perscription?      Thanks, Dot

## 2023-07-19 NOTE — TELEPHONE ENCOUNTER
LOV 6/13/23  RTO 2 months; F/U scheduled  LRF 6/13/23  CSM 11/3/21    Health Maintenance   Topic Date Due    Pneumococcal 65+ years Vaccine (1 - PCV) Never done    Colorectal Cancer Screen  Never done    Breast cancer screen  Never done    Shingles vaccine (1 of 2) Never done    DEXA (modify frequency per FRAX score)  Never done    Annual Wellness Visit (AWV)  Never done    COVID-19 Vaccine (3 - Booster for Moderna series) 06/30/2021    Flu vaccine (1) 08/01/2023    Depression Screen  05/15/2024    Diabetes screen  08/13/2024    Lipids  08/13/2026    DTaP/Tdap/Td vaccine (2 - Td or Tdap) 09/28/2030    Hepatitis A vaccine  Aged Out    Hib vaccine  Aged Out    Meningococcal (ACWY) vaccine  Aged Out    Hepatitis C screen  Discontinued    HIV screen  Discontinued             (applicable per patient's age: Cancer Screenings, Depression Screening, Fall Risk Screening, Immunizations)    Hemoglobin A1C (%)   Date Value   12/14/2019 4.8     LDL Cholesterol (mg/dL)   Date Value   08/13/2021 160 (H)     AST (U/L)   Date Value   04/07/2023 20     ALT (U/L)   Date Value   04/07/2023 20     BUN (mg/dL)   Date Value   06/17/2022 14      (goal A1C is < 7)   (goal LDL is <100) need 30-50% reduction from baseline     BP Readings from Last 3 Encounters:   06/13/23 120/80   05/15/23 120/78   04/07/23 124/78    (goal /80)      All Future Testing planned in [facility]:  Lab Frequency Next Occurrence   Cortisol Total Once 05/16/2023   Dexamethasone Once 05/16/2023   CARMELITA DIGITAL SCREEN W OR WO CAD BILATERAL Once 06/13/2023   Culture, Urine Every 4 Weeks 8/4/2023, 9/1/2023, 9/29/2023, 10/27/2023, 11/24/2023, 12/22/2023, 1/19/2024, 2/16/2024, 3/15/2024, 4/12/2024, 5/10/2024, 6/7/2024       Next Visit Date:  Future Appointments   Date Time Provider [address]   8/15/2023  1:30 PM Kristine Oral Curet, APRN - CNP [facility]            Patient Active Problem List:     Anxiety     Gastroesophageal reflux disease     Irritable bowel

## 2023-08-03 ENCOUNTER — OFFICE VISIT (OUTPATIENT)
Dept: FAMILY MEDICINE CLINIC | Age: 68
End: 2023-08-03
Payer: MEDICARE

## 2023-08-03 VITALS
HEART RATE: 80 BPM | DIASTOLIC BLOOD PRESSURE: 82 MMHG | BODY MASS INDEX: 25.56 KG/M2 | TEMPERATURE: 97.2 F | WEIGHT: 163.2 LBS | SYSTOLIC BLOOD PRESSURE: 114 MMHG

## 2023-08-03 DIAGNOSIS — R10.31 COLICKY RLQ ABDOMINAL PAIN: Primary | ICD-10-CM

## 2023-08-03 DIAGNOSIS — F41.9 ANXIETY: ICD-10-CM

## 2023-08-03 DIAGNOSIS — K21.9 GASTROESOPHAGEAL REFLUX DISEASE WITHOUT ESOPHAGITIS: ICD-10-CM

## 2023-08-03 PROCEDURE — G8427 DOCREV CUR MEDS BY ELIG CLIN: HCPCS | Performed by: NURSE PRACTITIONER

## 2023-08-03 PROCEDURE — 1036F TOBACCO NON-USER: CPT | Performed by: NURSE PRACTITIONER

## 2023-08-03 PROCEDURE — 1090F PRES/ABSN URINE INCON ASSESS: CPT | Performed by: NURSE PRACTITIONER

## 2023-08-03 PROCEDURE — G8417 CALC BMI ABV UP PARAM F/U: HCPCS | Performed by: NURSE PRACTITIONER

## 2023-08-03 PROCEDURE — 3017F COLORECTAL CA SCREEN DOC REV: CPT | Performed by: NURSE PRACTITIONER

## 2023-08-03 PROCEDURE — 1123F ACP DISCUSS/DSCN MKR DOCD: CPT | Performed by: NURSE PRACTITIONER

## 2023-08-03 PROCEDURE — G8400 PT W/DXA NO RESULTS DOC: HCPCS | Performed by: NURSE PRACTITIONER

## 2023-08-03 PROCEDURE — 99213 OFFICE O/P EST LOW 20 MIN: CPT | Performed by: NURSE PRACTITIONER

## 2023-08-03 ASSESSMENT — ENCOUNTER SYMPTOMS
BLOOD IN STOOL: 0
NAUSEA: 1
VOMITING: 0
ABDOMINAL PAIN: 1
ABDOMINAL DISTENTION: 1

## 2023-08-03 NOTE — PROGRESS NOTES
[address]   [address]  [phone]    8/3/23     Patient ID  Nicolasa Barreto is a 76 y.o. female  Established patient    Chief Complaint  Nicolasa Barreto presents today for Pain      ASSESSMENT/PLAN  1. Colicky RLQ abdominal pain  2. Gastroesophageal reflux disease without esophagitis  3. Anxiety     Feel as though passes stone  Had small pelvic stone in 6/1/23  Current Ct no stone? Return if symptoms worsen or fail to improve. Patient Care Team:  MARIA DE JESUS Gupta CNP as PCP - General (Nurse Practitioner Family)  MARIA DE JESUS Gupta CNP as PCP - Empaneled Provider    SUBJECTIVE/OBJECTIVE  History of Present illness / Visit Summary   ER follow up  Pain resolving     7/2/2023 ER -   Initial evaluation completed by Dr. Nicolasa Amos at 11:47 AM.  Pt is a 76 y.o. female presenting to the ED for the abd pain. Pt reports RUQ abd pain for the past several days. Pt states that pain has radiated into her RLQ, and reports attempting to alleviate pain using medication prescribed for GERD. Pt confirms diarrhea. Pt denies blood in stool, nausea, vomiting, fevers, or chills. PMHx of cholecystectomy. Pt states she still has her appendix. Review of Systems  Review of Systems   Gastrointestinal:  Positive for abdominal distention, abdominal pain and nausea. Negative for blood in stool and vomiting. Genitourinary:  Negative for difficulty urinating, dysuria, frequency, hematuria and urgency. Physical exam   Physical Exam  Vitals and nursing note reviewed. Constitutional:       General: She is not in acute distress. Appearance: Normal appearance. She is well-developed and well-groomed. She is not ill-appearing or toxic-appearing. Cardiovascular:      Rate and Rhythm: Normal rate and regular rhythm. No extrasystoles are present. Heart sounds: Normal heart sounds, S1 normal and S2 normal. No murmur heard.   Pulmonary:      Effort: Pulmonary effort

## 2023-08-05 DIAGNOSIS — F41.9 ANXIETY: ICD-10-CM

## 2023-08-07 ENCOUNTER — TELEPHONE (OUTPATIENT)
Dept: FAMILY MEDICINE CLINIC | Age: 68
End: 2023-08-07

## 2023-08-07 DIAGNOSIS — R10.31 COLICKY RLQ ABDOMINAL PAIN: Primary | ICD-10-CM

## 2023-08-07 RX ORDER — BUSPIRONE HYDROCHLORIDE 5 MG/1
TABLET ORAL
Qty: 180 TABLET | Refills: 1 | Status: SHIPPED | OUTPATIENT
Start: 2023-08-07

## 2023-08-07 NOTE — TELEPHONE ENCOUNTER
LOV 8/3/23   LRF 6/13/23    Health Maintenance   Topic Date Due    Pneumococcal 65+ years Vaccine (1 - PCV) Never done    Colorectal Cancer Screen  Never done    Breast cancer screen  Never done    Shingles vaccine (1 of 2) Never done    DEXA (modify frequency per FRAX score)  Never done    Annual Wellness Visit (AWV)  Never done    COVID-19 Vaccine (3 - Booster for Moderna series) 06/30/2021    Flu vaccine (1) Never done    Depression Screen  05/15/2024    Diabetes screen  08/13/2024    Lipids  08/13/2026    DTaP/Tdap/Td vaccine (2 - Td or Tdap) 09/28/2030    Hepatitis A vaccine  Aged Out    Hib vaccine  Aged Out    Meningococcal (ACWY) vaccine  Aged Out    Hepatitis C screen  Discontinued    HIV screen  Discontinued             (applicable per patient's age: Cancer Screenings, Depression Screening, Fall Risk Screening, Immunizations)    Hemoglobin A1C (%)   Date Value   12/14/2019 4.8     LDL Cholesterol (mg/dL)   Date Value   08/13/2021 160 (H)     AST (U/L)   Date Value   04/07/2023 20     ALT (U/L)   Date Value   04/07/2023 20     BUN (mg/dL)   Date Value   06/17/2022 14      (goal A1C is < 7)   (goal LDL is <100) need 30-50% reduction from baseline     BP Readings from Last 3 Encounters:   08/03/23 114/82   06/13/23 120/80   05/15/23 120/78    (goal /80)      All Future Testing planned in [facility]:  Lab Frequency Next Occurrence   Cortisol Total Once 05/16/2023   Dexamethasone Once 05/16/2023   CARMELITA DIGITAL SCREEN W OR WO CAD BILATERAL Once 06/13/2023   Culture, Urine Every 4 Weeks 9/1/2023, 9/29/2023, 10/27/2023, 11/24/2023, 12/22/2023, 1/19/2024, 2/16/2024, 3/15/2024, 4/12/2024, 5/10/2024, 6/7/2024, 7/5/2024       Next Visit Date:  Future Appointments   Date Time Provider [address]   8/15/2023  1:30 PM Kristine Coleman, APRN - CNP [facility]   10/6/2023 11:30 AM Kristine Coleman, MARIA DE JESUS - [facility]            Patient Active Problem List:     Anxiety     Gastroesophageal reflux

## 2023-08-07 NOTE — TELEPHONE ENCOUNTER
Patient called stating that she's still having  pain in right side below her ribs, also stated that she did have a bm x3 yesterday. patient has no mucus with bm,no straining and no blood on bm or toilet paper. pain is remaining same but seems to come and go. Patient stated bm is formed and feels like she's going normal .  Patient was given Lactulose solution 30 mg, patient questioning next step?  Mri?

## 2023-08-08 ENCOUNTER — PATIENT MESSAGE (OUTPATIENT)
Dept: FAMILY MEDICINE CLINIC | Age: 68
End: 2023-08-08

## 2023-08-08 ENCOUNTER — HOSPITAL ENCOUNTER (EMERGENCY)
Age: 68
Discharge: HOME | End: 2023-08-08
Attending: EMERGENCY MEDICINE
Payer: MEDICARE

## 2023-08-08 ENCOUNTER — HOSPITAL ENCOUNTER (OUTPATIENT)
Age: 68
Setting detail: SPECIMEN
Discharge: HOME | End: 2023-08-08

## 2023-08-08 VITALS
RESPIRATION RATE: 16 BRPM | TEMPERATURE: 97.6 F | SYSTOLIC BLOOD PRESSURE: 155 MMHG | HEIGHT: 67 IN | BODY MASS INDEX: 25.11 KG/M2 | HEART RATE: 69 BPM | WEIGHT: 160 LBS | OXYGEN SATURATION: 98 % | DIASTOLIC BLOOD PRESSURE: 68 MMHG

## 2023-08-08 DIAGNOSIS — R10.31 COLICKY RLQ ABDOMINAL PAIN: Primary | ICD-10-CM

## 2023-08-08 DIAGNOSIS — R10.10 PAIN OF UPPER ABDOMEN: Primary | ICD-10-CM

## 2023-08-08 DIAGNOSIS — R82.90 ABNORMAL URINALYSIS: ICD-10-CM

## 2023-08-08 DIAGNOSIS — K21.9 GASTROESOPHAGEAL REFLUX DISEASE WITHOUT ESOPHAGITIS: ICD-10-CM

## 2023-08-08 PROCEDURE — 99283 EMERGENCY DEPT VISIT LOW MDM: CPT

## 2023-08-08 RX ORDER — PREDNISONE 10 MG/1
10 TABLET ORAL SEE ADMIN INSTRUCTIONS
Qty: 17 TABLET | Refills: 0 | Status: SHIPPED | OUTPATIENT
Start: 2023-08-08 | End: 2023-08-14

## 2023-08-08 RX ORDER — HYDROCODONE BITARTRATE AND ACETAMINOPHEN 5; 325 MG/1; MG/1
1 TABLET ORAL EVERY 6 HOURS PRN
Qty: 10 TABLET | Refills: 0 | Status: SHIPPED | OUTPATIENT
Start: 2023-08-08 | End: 2023-08-11

## 2023-08-08 ASSESSMENT — ENCOUNTER SYMPTOMS
VOMITING: 0
SHORTNESS OF BREATH: 0
ABDOMINAL PAIN: 1
SORE THROAT: 0
DIARRHEA: 0

## 2023-08-08 ASSESSMENT — PAIN - FUNCTIONAL ASSESSMENT: PAIN_FUNCTIONAL_ASSESSMENT: 0-10

## 2023-08-08 ASSESSMENT — PAIN SCALES - GENERAL: PAINLEVEL_OUTOF10: 3

## 2023-08-08 NOTE — TELEPHONE ENCOUNTER
She has standing order for urine culture? Did she not leave one? Not in system?    She can stop in and give urine with lab

## 2023-08-08 NOTE — ED PROVIDER NOTES
[address] [facility], [place], operated by [facility]  [id number] [facility]. [facility] [address]  Phone: [phone]  Fax: [facility]      Pt Name: Nicolasa Barreto  MRN: [id number]  [address] [facility] [address] 1955  Date of evaluation: 8/8/2023  Provider: Aron Cannon       Chief Complaint   Patient presents with    Abdominal Pain     C/o RUQ abd pain off and on since June. Reports has been to urgent care and pcp multiple times; has been told possible constipation or kidney stones. Has had multiple CT scans. Reports pain worse this week. Reports has tried laxatives, very little stool output. Reports nausea. Reports gallbladder previously removed. HISTORY OF PRESENT ILLNESS   (Location/Symptom, Timing/Onset,Context/Setting, Quality, Duration, Modifying Factors, Severity)  Note limiting factors. Nicolasa Barreto is a 76 y.o. female who presents to the emergency department [for the evaluation of right upper quadrant abdominal pain. This has been an intermittent problem since June.]  Patient has been seen in the emergency department for this 3 times and she has seen her primary care physician as well. Patient reports that she is trying to see a GI specialist but she is not able to be seen until the end of September. Patient states that she is having intermittent, stabbing right upper quadrant abdominal pain. When it is not present she is completely fine, when it is present it is a stabbing pain. She states last night it was present throughout most the night and she could not get much sleep. Patient did not take anything for it. Patient is having some nausea and decreased appetite associated with it, she was having diarrhea but that resolved.   At one of her recent ER visits she was told it may be constipation and they had her take some magnesium citrate, she did not feel that had any significant

## 2023-08-08 NOTE — TELEPHONE ENCOUNTER
Please advise. Patient was back in the ED yesterday and nothing was done beside giving her a steroid. Patient states that she has had a urine culture and UA last week.  Writer unable to locate the culture results

## 2023-08-08 NOTE — TELEPHONE ENCOUNTER
Patient states she will give a urine sample. In the meantime before appointment she is still experiencing the RUQ pain which radiates to her back and middle of upper stomach. Any organs in this area that may be affected? She feels swollen under her ribcage. Sharp shooting pains that come and go. She has been having it for 2 months, no relief with any medications prescribed. Last ER prescribed steroids but she is hesitant to take as they were unsure where inflammation is located? Should she take steroids? Would like CT scans to be reviewed and order any additional imaging prior to 8/15/23 appointment?

## 2023-08-09 LAB
MICROORGANISM SPEC CULT: NORMAL
SPECIMEN DESCRIPTION: NORMAL

## 2023-08-09 NOTE — TELEPHONE ENCOUNTER
I placed referral to GI   Let patient know so she can call and schedule.    CT scans show nothing abnormal.

## 2023-08-09 NOTE — TELEPHONE ENCOUNTER
From: Roslyn Kumari  To: Sea Majano  Sent: 8/8/2023 6:03 PM EDT  Subject: GI specialist    I talked to an office in [place]. If you can send a referral they can get me on the books. They are out a month or so but get cancellations.  Probably time to see if he can help with stomach issues, etc.   Dr. Enriquez [place]  Phone: [phone]    Thanks, Dot

## 2023-08-15 ENCOUNTER — OFFICE VISIT (OUTPATIENT)
Dept: FAMILY MEDICINE CLINIC | Age: 68
End: 2023-08-15
Payer: MEDICARE

## 2023-08-15 VITALS
WEIGHT: 161.8 LBS | SYSTOLIC BLOOD PRESSURE: 102 MMHG | HEART RATE: 78 BPM | TEMPERATURE: 96.6 F | BODY MASS INDEX: 25.34 KG/M2 | OXYGEN SATURATION: 98 % | DIASTOLIC BLOOD PRESSURE: 84 MMHG | RESPIRATION RATE: 16 BRPM

## 2023-08-15 DIAGNOSIS — F41.9 ANXIETY: Primary | ICD-10-CM

## 2023-08-15 DIAGNOSIS — M62.838 MUSCLE SPASM: ICD-10-CM

## 2023-08-15 DIAGNOSIS — K21.9 GASTROESOPHAGEAL REFLUX DISEASE WITHOUT ESOPHAGITIS: ICD-10-CM

## 2023-08-15 PROCEDURE — 99214 OFFICE O/P EST MOD 30 MIN: CPT | Performed by: NURSE PRACTITIONER

## 2023-08-15 PROCEDURE — 1090F PRES/ABSN URINE INCON ASSESS: CPT | Performed by: NURSE PRACTITIONER

## 2023-08-15 PROCEDURE — 3017F COLORECTAL CA SCREEN DOC REV: CPT | Performed by: NURSE PRACTITIONER

## 2023-08-15 PROCEDURE — 1036F TOBACCO NON-USER: CPT | Performed by: NURSE PRACTITIONER

## 2023-08-15 PROCEDURE — G8417 CALC BMI ABV UP PARAM F/U: HCPCS | Performed by: NURSE PRACTITIONER

## 2023-08-15 PROCEDURE — G8427 DOCREV CUR MEDS BY ELIG CLIN: HCPCS | Performed by: NURSE PRACTITIONER

## 2023-08-15 PROCEDURE — G8400 PT W/DXA NO RESULTS DOC: HCPCS | Performed by: NURSE PRACTITIONER

## 2023-08-15 PROCEDURE — 1123F ACP DISCUSS/DSCN MKR DOCD: CPT | Performed by: NURSE PRACTITIONER

## 2023-08-15 RX ORDER — TIZANIDINE 2 MG/1
2 TABLET ORAL 3 TIMES DAILY PRN
Qty: 30 TABLET | Refills: 0 | Status: SHIPPED | OUTPATIENT
Start: 2023-08-15

## 2023-08-15 NOTE — PROGRESS NOTES
[address]   [address]  [phone]    8/15/23     Patient ID  Yanick Cortes is a 76 y.o. female  Established patient    Chief Complaint  Yanick Cortes presents today for Anxiety and Abdominal Pain (RT side radiating to her back. Severe pain but wondering about pulled muscle? )    Have you seen any other physician or provider since your last visit? Yes - Records Obtained  Have you had any other diagnostic tests since your last visit? Yes - Records Obtained  Have you been seen in the emergency room and/or had an admission to a hospital since we last saw you? Yes - Records Obtained     ASSESSMENT/PLAN  1. Anxiety  2. Gastroesophageal reflux disease without esophagitis  3. Muscle spasm  -     tiZANidine (ZANAFLEX) 2 MG tablet; Take 1 tablet by mouth 3 times daily as needed (muscle spasm), Disp-30 tablet, R-0Normal  -     [facility]     Call GI  Try muscle relaxer for potential muscle spasm   PT referral ?      Return if symptoms worsen or fail to improve. Patient Care Team:  MARIA D EJESUS Cruz CNP as PCP - General (Nurse Practitioner Family)  MARIA DE JESUS Borja CNP as PCP - Empaneled Provider    SUBJECTIVE/OBJECTIVE  History of Present illness / Visit Summary   ER follow up  Numerous ER visits with same complaint  All labs and imaging WNL  Still has not called to schedule with GI? Asking for PT referral - thinks it is muscle? Review of Systems  Review of Systems   Constitutional:  Negative for activity change, appetite change, fever and unexpected weight change. Respiratory:  Negative for chest tightness and shortness of breath. Gastrointestinal:  Positive for abdominal distention and abdominal pain (RUQ). Negative for constipation, diarrhea, nausea and vomiting. Genitourinary:  Positive for flank pain (right ?). Negative for difficulty urinating and dysuria.    Psychiatric/Behavioral:  Positive for agitation

## 2023-08-16 ENCOUNTER — TELEPHONE (OUTPATIENT)
Dept: FAMILY MEDICINE CLINIC | Age: 68
End: 2023-08-16

## 2023-08-18 ENCOUNTER — TELEPHONE (OUTPATIENT)
Dept: FAMILY MEDICINE CLINIC | Age: 68
End: 2023-08-18

## 2023-08-18 ENCOUNTER — HOSPITAL ENCOUNTER (OUTPATIENT)
Age: 68
Setting detail: THERAPIES SERIES
Discharge: HOME | End: 2023-08-18
Payer: MEDICARE

## 2023-08-18 PROCEDURE — 97161 PT EVAL LOW COMPLEX 20 MIN: CPT

## 2023-08-18 PROCEDURE — 97110 THERAPEUTIC EXERCISES: CPT

## 2023-08-18 PROCEDURE — G0283 ELEC STIM OTHER THAN WOUND: HCPCS

## 2023-08-18 NOTE — TELEPHONE ENCOUNTER
Writer took over call with pt. Pt is complaining or Right upper quad pain. She states this has been going on for a couple of months and she has been to the urgent care and ER multiple times. Pt wants a MRI or something to deal with the pain. Pt states she started to take medications on Tues the tizanidine and has not had any improvements. Pt states it is a sharp stabbing pain and is becoming more constant. Pt has no new symptoms or any other symptoms. Pt is worried that it could be something more serious. Writer advise pt that she can come to walkin, go to a urgent care or ER if she felt the need or if symptoms got worse and unbearable pain. Pt said she would not do that as she was told that it was not a ER situation and to follow up with PCP. Pt kept asking for what writer thought and writer kept telling pt that writer is unable to give any advise on pain or what she thought, and could only send a message to get the advise. Writer would send message to PCP and a provider that is in office since PCP is out of office. Pt said that she was upset that PCP was off and wanted writer to personally call PCP. Writer said she would send a message and the office would contact pt once a provider responded but was not going to call her PCP.

## 2023-08-18 NOTE — TELEPHONE ENCOUNTER
Spoke with pt message given to pt. Pt states she decided to try PT after all the pain she was in. She was seen today and PT told her it is back pain that is radiating to her side. Pt states she was there for over an hour getting worked on and she feels some relief. She sees them again next week. She did talk to GI and has up coming appt with them 8/31/23 as that is the soonest they could get her in.

## 2023-08-18 NOTE — TELEPHONE ENCOUNTER
I am sorry she is in pain. I am not ordering an MRI or CT scan today as this is not appropriate for me to do. CTs and MRIs even if ordered wouldn't get done today. If she goes to the ER and they think it is appropriate to order a CT - they can. Her provider is not in today but will be back Monday. It sounds like this problem has been ongoing for some time so there doesn't appear to be anything acute today that cannot wait until AD is back in the office. If there is she must go to the [facility] or ER for evaluation. It has been recommended multiple times that she call her GI doctor - has she done this?

## 2023-08-18 NOTE — TELEPHONE ENCOUNTER
Pt stated that the medicine that AD is having her try for her muscle spasms, is not working. She would like to get the MRI her and AD has discussed. Pt states that the medicine does nothing for the pain at all and if anything makes her a little \"loopy\" Writer did inform patient that AD was out of office and to allow her some time to reply. Pt was very hostile and rude in saying she did not care that AD wasn't here and that we need to do something for her ASAP. Writer advised ED for pain. Pt refused and continued yelling.

## 2023-08-18 NOTE — PROGRESS NOTES
[facility] Fall Risk Assessment    Patient Name:  Preeti Ang  : 1955    Risk Factor Scale  Score   History of Falls [] Yes  [] No 25  0 0   Secondary Diagnosis [] Yes  [] No 15  0 0   Ambulatory Aid [] Furniture  [] Crutches/cane/walker  [] None/bedrest/wheelchair/nurse 30  15  0 0   IV/Heparin Lock [] Yes  [] No 20  0 0   Gait/Transferring [] Impaired  [] Weak  [] Normal/bedrest/immobile 20  10  0 0   Mental Status [] Forgets limitations  [] Oriented to own ability 15  0 0      Total:0     Based on the Assessment score: check the appropriate box.     [x]  No intervention needed   Low =   Score of 0-24    []  Use standard prevention interventions Moderate =  Score of 24-44   [] Give patient handout and discuss fall prevention strategies   [] Establish goal of education for patient/family RE: fall prevention strategies    []  Use high risk prevention interventions High = Score of 45 and higher   [] Give patient handout and discuss fall prevention strategies   [] Establish goal of education for patient/family Re: fall prevention strategies   [] Discuss lifeline / other resources    Electronically signed by:   Artemio Anthony PT  Date: 2023

## 2023-08-21 ENCOUNTER — HOSPITAL ENCOUNTER (OUTPATIENT)
Age: 68
Setting detail: THERAPIES SERIES
Discharge: HOME | End: 2023-08-21
Payer: MEDICARE

## 2023-08-21 PROCEDURE — 97110 THERAPEUTIC EXERCISES: CPT

## 2023-08-21 ASSESSMENT — ENCOUNTER SYMPTOMS
CHEST TIGHTNESS: 0
NAUSEA: 0
VOMITING: 0
CONSTIPATION: 0
DIARRHEA: 0
ABDOMINAL DISTENTION: 1
ABDOMINAL PAIN: 1
SHORTNESS OF BREATH: 0

## 2023-08-22 ENCOUNTER — TELEPHONE (OUTPATIENT)
Dept: PRIMARY CARE CLINIC | Age: 68
End: 2023-08-22

## 2023-08-22 ENCOUNTER — TELEPHONE (OUTPATIENT)
Dept: FAMILY MEDICINE CLINIC | Age: 68
End: 2023-08-22

## 2023-08-22 DIAGNOSIS — M54.50 ACUTE RIGHT-SIDED LOW BACK PAIN WITHOUT SCIATICA: Primary | ICD-10-CM

## 2023-08-22 NOTE — TELEPHONE ENCOUNTER
Patient called stating she's still having pain after all the ct scans and therapy , patient stated to therapy she has the same pain while there and pt is not helping. patient has pain all day every day patient is fed up and wants to know next step. Yuriyfady Collado a mri. at one point pain lasted a hour in half.patient concerned with physical therapy doing stretching that its doing more harm.

## 2023-08-22 NOTE — TELEPHONE ENCOUNTER
Patient called office to schedule with Ayala Mireles. Patient was told by her primary care office to schedule with . Patient states she is in extreme pain and she feels her current PCP is not helping her. Let patient know   Ayala Mireles does not prescribe controlled substances and would more than likely referrer the patient to specialist. Patient was very upset on the phone and states she feels she is getting no help. Scheduled patient appointment to establish care per patient request.    Spoke with patient on phone for over 15 minutes.

## 2023-08-22 NOTE — TELEPHONE ENCOUNTER
We need to see what GI input is. I ordered xray of lumbar spine. Insurance will not cover MRI without obtaining xray prior.

## 2023-08-23 ENCOUNTER — HOSPITAL ENCOUNTER (OUTPATIENT)
Age: 68
Setting detail: THERAPIES SERIES
Discharge: HOME | End: 2023-08-23
Payer: MEDICARE

## 2023-08-23 NOTE — FLOWSHEET NOTE
[] [facility] - [facility] &  Therapy  [address] [facility].    [phone]  F: [phone]   [] [address] [facility]  [address]   [address]  P: [phone]  F: [phone]  [] [address] &  Therapy  [address] [facility]  P: [phone]  F: [phone] [] [facility]  P: [phone]  F: [phone]  [] [address]   [place]: [phone]  F: [phone]   [] [facility]  [address]  [place]: [phone]   F: [phone]     Physical Therapy Cancel/No Show note    Date: 2023  Patient: Drew Crawford  : 1955  MRN: [id number]    Cancels/No Shows to date:     For today's appointment patient:    [x]  Cancelled    [] Rescheduled appointment    [] No-show     Reason given by patient:    []  Patient ill    []  Conflicting appointment    [] No transportation      [] Conflict with work    [] No reason given    [] Weather related    [] COVID-19    [x] Other:      Comments:  awaiting x-ray      [x] Next appointment was confirmed    Electronically signed by: Roz Medrano PT

## 2023-08-24 ENCOUNTER — HOSPITAL ENCOUNTER (OUTPATIENT)
Age: 68
Setting detail: SPECIMEN
Discharge: HOME | End: 2023-08-24

## 2023-08-24 ENCOUNTER — HOSPITAL ENCOUNTER (OUTPATIENT)
Dept: GENERAL RADIOLOGY | Age: 68
Discharge: HOME | End: 2023-08-26
Payer: MEDICARE

## 2023-08-24 ENCOUNTER — HOSPITAL ENCOUNTER (OUTPATIENT)
Age: 68
Discharge: HOME | End: 2023-08-26
Payer: MEDICARE

## 2023-08-24 DIAGNOSIS — M54.50 ACUTE RIGHT-SIDED LOW BACK PAIN WITHOUT SCIATICA: ICD-10-CM

## 2023-08-24 DIAGNOSIS — R82.90 ABNORMAL URINALYSIS: ICD-10-CM

## 2023-08-24 PROCEDURE — 72100 X-RAY EXAM L-S SPINE 2/3 VWS: CPT

## 2023-08-25 ENCOUNTER — HOSPITAL ENCOUNTER (OUTPATIENT)
Age: 68
Setting detail: THERAPIES SERIES
Discharge: HOME | End: 2023-08-25
Payer: MEDICARE

## 2023-08-25 LAB
MICROORGANISM SPEC CULT: NORMAL
SPECIMEN DESCRIPTION: NORMAL

## 2023-08-25 PROCEDURE — 97110 THERAPEUTIC EXERCISES: CPT

## 2023-08-25 PROCEDURE — 97140 MANUAL THERAPY 1/> REGIONS: CPT

## 2023-08-27 DIAGNOSIS — M51.36 LUMBAR DEGENERATIVE DISC DISEASE: Primary | ICD-10-CM

## 2023-08-28 ENCOUNTER — PATIENT MESSAGE (OUTPATIENT)
Dept: FAMILY MEDICINE CLINIC | Age: 68
End: 2023-08-28

## 2023-08-28 ENCOUNTER — HOSPITAL ENCOUNTER (OUTPATIENT)
Age: 68
Setting detail: THERAPIES SERIES
Discharge: HOME | End: 2023-08-28
Payer: MEDICARE

## 2023-08-28 PROCEDURE — 97140 MANUAL THERAPY 1/> REGIONS: CPT

## 2023-08-28 PROCEDURE — 97110 THERAPEUTIC EXERCISES: CPT

## 2023-09-01 ENCOUNTER — HOSPITAL ENCOUNTER (OUTPATIENT)
Age: 68
Setting detail: THERAPIES SERIES
Discharge: HOME | End: 2023-09-01
Payer: MEDICARE

## 2023-09-01 PROCEDURE — 97112 NEUROMUSCULAR REEDUCATION: CPT

## 2023-09-01 PROCEDURE — 97140 MANUAL THERAPY 1/> REGIONS: CPT

## 2023-09-01 PROCEDURE — 97110 THERAPEUTIC EXERCISES: CPT

## 2023-09-07 ENCOUNTER — HOSPITAL ENCOUNTER (OUTPATIENT)
Dept: MRI IMAGING | Age: 68
Discharge: HOME | End: 2023-09-09
Payer: MEDICARE

## 2023-09-07 ENCOUNTER — TELEPHONE (OUTPATIENT)
Dept: GASTROENTEROLOGY | Age: 68
End: 2023-09-07

## 2023-09-07 DIAGNOSIS — M51.36 LUMBAR DEGENERATIVE DISC DISEASE: ICD-10-CM

## 2023-09-07 PROCEDURE — 72148 MRI LUMBAR SPINE W/O DYE: CPT

## 2023-09-07 NOTE — TELEPHONE ENCOUNTER
Writer spoke with patient regarding her upcoming appointment and her concerns of rib pain which she believes does not associate with Dr Percy Dai scope of practice and just feels like she just seeing different doctors for no reason if she don't need to. Patient wants Dr Percy Dai recommendation before coming into office.

## 2023-09-08 ENCOUNTER — HOSPITAL ENCOUNTER (OUTPATIENT)
Age: 68
Setting detail: THERAPIES SERIES
Discharge: HOME | End: 2023-09-08
Payer: MEDICARE

## 2023-09-08 NOTE — TELEPHONE ENCOUNTER
Writer left message for patient to keep scheduled appointment, and if she feels different specialist appointment  is needed she should discuss that with her primary care doctor.

## 2023-09-08 NOTE — FLOWSHEET NOTE
[] [facility] ([facility]) - [facility] &  Therapy  [address] [facility].    [phone]  F: [phone]   [] [address] [facility]  [address]   [address]  P: [phone]  F: [phone]  [] [address] &  Therapy  [address] [facility]  P: [phone]  F: [phone] [] [facility]  P: [phone]  F: [phone]  [] [address] [facility]  [address] [facility] [address]   [place]: [phone]  F: [phone]   [] 97 [facility]  [address]  [place]: [phone]   F: [phone]     Physical Therapy Cancel/No Show note    Date: 2023  Patient: Jen Gonzalez  : 1955  MRN: [id number]    Cancels/No Shows to date:     For today's appointment patient:    [x]  Cancelled    [] Rescheduled appointment    [] No-show     Reason given by patient:    []  Patient ill    []  Conflicting appointment    [] No transportation      [] Conflict with work    [] No reason given    [] Weather related    [] COVID-19    [] Other:      Comments:        [x] Next appointment was confirmed    Electronically signed by: Ephraim Butt PT

## 2023-09-11 ENCOUNTER — HOSPITAL ENCOUNTER (OUTPATIENT)
Age: 68
Setting detail: THERAPIES SERIES
Discharge: HOME | End: 2023-09-11
Payer: MEDICARE

## 2023-09-11 NOTE — FLOWSHEET NOTE
[] [facility] ([facility]) - [facility] &  Therapy  [address] [facility].    [phone]  F: [phone]   [] [address]  [address]  P: [phone]  F: [phone]  [] [address]  [address]  P: [phone]  F: [phone] [] [facility]  P: [phone]  F: [phone]  [] [address] [facility]  [address]   [address]   [place]: [phone]  F: [phone]   [] [facility]  [address]  [place]: [phone]   F: [phone]     Physical Therapy Cancel/No Show note    Date: 2023  Patient: Jacinta Oh  : 1955  MRN: [id number]    Cancels/No Shows to date: 3/0    For today's appointment patient:    [x]  Cancelled    [] Rescheduled appointment    [] No-show     Reason given by patient:    []  Patient ill    []  Conflicting appointment    [] No transportation      [] Conflict with work    [] No reason given    [] Weather related    [] COVID-19    [x] Other:   awaiting MRI results; she stated she would call after she talked over the results w/ her doctor   Comments:        [] Next appointment was confirmed    Electronically signed by: Catherine Deutsch PT

## 2023-09-22 ENCOUNTER — OFFICE VISIT (OUTPATIENT)
Dept: PRIMARY CARE CLINIC | Age: 68
End: 2023-09-22
Payer: MEDICARE

## 2023-09-22 ENCOUNTER — HOSPITAL ENCOUNTER (OUTPATIENT)
Age: 68
Setting detail: SPECIMEN
Discharge: HOME | End: 2023-09-22

## 2023-09-22 VITALS
WEIGHT: 162 LBS | OXYGEN SATURATION: 97 % | HEART RATE: 80 BPM | DIASTOLIC BLOOD PRESSURE: 86 MMHG | SYSTOLIC BLOOD PRESSURE: 122 MMHG | TEMPERATURE: 98 F | BODY MASS INDEX: 25.37 KG/M2

## 2023-09-22 DIAGNOSIS — N89.8 VAGINAL IRRITATION: ICD-10-CM

## 2023-09-22 DIAGNOSIS — N89.8 VAGINAL IRRITATION: Primary | ICD-10-CM

## 2023-09-22 DIAGNOSIS — Z87.42: ICD-10-CM

## 2023-09-22 LAB
BILIRUBIN, POC: NEGATIVE
BLOOD URINE, POC: NORMAL
CLARITY, POC: CLEAR
COLOR, POC: NORMAL
GLUCOSE URINE, POC: NEGATIVE
KETONES, POC: NEGATIVE
LEUKOCYTE EST, POC: NEGATIVE
NITRITE, POC: NEGATIVE
PH, POC: 5.5
PROTEIN, POC: NEGATIVE
SPECIFIC GRAVITY, POC: 1.01
UROBILINOGEN, POC: 0.2

## 2023-09-22 PROCEDURE — 3017F COLORECTAL CA SCREEN DOC REV: CPT

## 2023-09-22 PROCEDURE — G8400 PT W/DXA NO RESULTS DOC: HCPCS

## 2023-09-22 PROCEDURE — G8427 DOCREV CUR MEDS BY ELIG CLIN: HCPCS

## 2023-09-22 PROCEDURE — 1036F TOBACCO NON-USER: CPT

## 2023-09-22 PROCEDURE — G8417 CALC BMI ABV UP PARAM F/U: HCPCS

## 2023-09-22 PROCEDURE — 99213 OFFICE O/P EST LOW 20 MIN: CPT

## 2023-09-22 PROCEDURE — 81003 URINALYSIS AUTO W/O SCOPE: CPT

## 2023-09-22 PROCEDURE — 1090F PRES/ABSN URINE INCON ASSESS: CPT

## 2023-09-22 PROCEDURE — 1123F ACP DISCUSS/DSCN MKR DOCD: CPT

## 2023-09-22 ASSESSMENT — ENCOUNTER SYMPTOMS
RESPIRATORY NEGATIVE: 1
ABDOMINAL PAIN: 0
NAUSEA: 0
VOMITING: 0

## 2023-09-22 NOTE — PROGRESS NOTES
[facility] [place]  [facility] [address]    [facility] [place]  [address] [place] [address]  Dept: [phone]    Esdras Quevedo is a 76 y.o. female Established patient, who presents to the walk-in clinic today with conditions/complaints as noted below:    Chief Complaint   Patient presents with    Dysuria     Dysuria for a couple days. HPI:     Patient is a 72-year-old female that presents today for vaginal irritation. Describes an internal burning sensation that's been ongoing for the past few days. Denies vaginal discharge, bleeding, or pelvic pain. Pertinent PMH includes postmenopausal atrophic vaginitis. Notes that she saw her OB/GYN on 9/6 for her annual exam and had some burning at the time as well, so they talked about adjusting her Estrogen dose. She wants to make sure she doesn't have BV because that's happened before. Denies any fevers, chills, abdominal pain, nausea, vomiting, dysuria, frequency, urgency, hematuria, or flank pain. Past Medical History:   Diagnosis Date    Acid reflux     Agoraphobia     Asthma     Herpes     Irritable bowel syndrome     Mitral valve prolapse        Current Outpatient Medications   Medication Sig Dispense Refill    busPIRone (BUSPAR) 5 MG tablet take 1 to 2 tablets by mouth three times a day 180 tablet 1    clonazePAM (KLONOPIN) 0.5 MG tablet Take 1 tablet by mouth nightly as needed for Anxiety for up to 30 days.  Max Daily Amount: 0.5 mg 30 tablet 0    omeprazole (PRILOSEC) 20 MG delayed release capsule TAKE 1 CAPSULE DAILY 90 capsule 1    Azelastine HCl 137 MCG/SPRAY SOLN spray 1 to 2 sprays into each nostril twice a day      albuterol sulfate HFA (PROVENTIL;VENTOLIN;PROAIR) 108 (90 Base) MCG/ACT inhaler 2 puffs as needed Inhalation every 4 hrs for 16 18 g 0    diclofenac sodium (VOLTAREN) 1 % GEL

## 2023-09-23 LAB
CANDIDA SPECIES: NEGATIVE
GARDNERELLA VAGINALIS: NEGATIVE
MICROORGANISM SPEC CULT: NORMAL
SOURCE: NORMAL
SPECIMEN DESCRIPTION: NORMAL
TRICHOMONAS: NEGATIVE

## 2023-09-29 ENCOUNTER — OFFICE VISIT (OUTPATIENT)
Dept: FAMILY MEDICINE CLINIC | Age: 68
End: 2023-09-29
Payer: MEDICARE

## 2023-09-29 ENCOUNTER — HOSPITAL ENCOUNTER (OUTPATIENT)
Age: 68
Setting detail: SPECIMEN
Discharge: HOME | End: 2023-09-29

## 2023-09-29 VITALS
RESPIRATION RATE: 16 BRPM | HEART RATE: 75 BPM | SYSTOLIC BLOOD PRESSURE: 112 MMHG | BODY MASS INDEX: 25.53 KG/M2 | DIASTOLIC BLOOD PRESSURE: 80 MMHG | OXYGEN SATURATION: 97 % | TEMPERATURE: 97.3 F | WEIGHT: 163 LBS

## 2023-09-29 DIAGNOSIS — R82.90 ABNORMAL URINALYSIS: ICD-10-CM

## 2023-09-29 DIAGNOSIS — F41.9 ANXIETY: Primary | ICD-10-CM

## 2023-09-29 DIAGNOSIS — N89.8 VAGINAL DISCHARGE: ICD-10-CM

## 2023-09-29 LAB
BILIRUBIN, POC: NEGATIVE
BLOOD URINE, POC: ABNORMAL
CLARITY, POC: CLEAR
COLOR, POC: YELLOW
GLUCOSE URINE, POC: NEGATIVE
KETONES, POC: NEGATIVE
LEUKOCYTE EST, POC: NEGATIVE
NITRITE, POC: NEGATIVE
PH, POC: 6
PROTEIN, POC: NEGATIVE
SPECIFIC GRAVITY, POC: 1.01
UROBILINOGEN, POC: 0.2

## 2023-09-29 PROCEDURE — 1123F ACP DISCUSS/DSCN MKR DOCD: CPT | Performed by: NURSE PRACTITIONER

## 2023-09-29 PROCEDURE — G8427 DOCREV CUR MEDS BY ELIG CLIN: HCPCS | Performed by: NURSE PRACTITIONER

## 2023-09-29 PROCEDURE — G8400 PT W/DXA NO RESULTS DOC: HCPCS | Performed by: NURSE PRACTITIONER

## 2023-09-29 PROCEDURE — 1036F TOBACCO NON-USER: CPT | Performed by: NURSE PRACTITIONER

## 2023-09-29 PROCEDURE — 99214 OFFICE O/P EST MOD 30 MIN: CPT | Performed by: NURSE PRACTITIONER

## 2023-09-29 PROCEDURE — 81002 URINALYSIS NONAUTO W/O SCOPE: CPT | Performed by: NURSE PRACTITIONER

## 2023-09-29 PROCEDURE — 3017F COLORECTAL CA SCREEN DOC REV: CPT | Performed by: NURSE PRACTITIONER

## 2023-09-29 PROCEDURE — 1090F PRES/ABSN URINE INCON ASSESS: CPT | Performed by: NURSE PRACTITIONER

## 2023-09-29 PROCEDURE — G8417 CALC BMI ABV UP PARAM F/U: HCPCS | Performed by: NURSE PRACTITIONER

## 2023-09-29 RX ORDER — CLONAZEPAM 0.5 MG/1
0.5 TABLET ORAL NIGHTLY PRN
Qty: 30 TABLET | Refills: 0 | Status: SHIPPED | OUTPATIENT
Start: 2023-09-29 | End: 2023-10-29

## 2023-09-29 NOTE — PROGRESS NOTES
Speech: Speech normal.         Behavior: Behavior normal. Behavior is cooperative. Thought Content: Thought content normal. Thought content does not include suicidal ideation. Thought content does not include suicidal plan. Cognition and Memory: Cognition and memory normal.         Judgment: Judgment normal.           Electronically signed by MARIA DE JESUS Garcia CNP, APRN-CNP on 10/5/2023 at 10:16 PM    Please note that this chart was generated using voice recognition Dragon dictation software. Although every effort was made to ensure the accuracy of this automated transcription, some errors in transcription may have occurred.

## 2023-09-30 LAB
MICROORGANISM SPEC CULT: NORMAL
SPECIMEN DESCRIPTION: NORMAL

## 2023-10-03 DIAGNOSIS — F41.9 ANXIETY: ICD-10-CM

## 2023-10-03 RX ORDER — BUSPIRONE HYDROCHLORIDE 5 MG/1
TABLET ORAL
Qty: 180 TABLET | Refills: 1 | Status: SHIPPED | OUTPATIENT
Start: 2023-10-03

## 2023-10-03 NOTE — TELEPHONE ENCOUNTER
LOV 9/29/2023     RTO 11/3/2023  LRF 8/7/2023          Controlled Substance Monitoring:    Acute and Chronic Pain Monitoring:        No data to display

## 2023-10-05 ASSESSMENT — ENCOUNTER SYMPTOMS
COUGH: 0
CONSTIPATION: 0
BLOOD IN STOOL: 0
ABDOMINAL DISTENTION: 0
DIARRHEA: 0
CHEST TIGHTNESS: 0
BACK PAIN: 0
ABDOMINAL PAIN: 0
SHORTNESS OF BREATH: 0

## 2023-10-28 DIAGNOSIS — F41.9 ANXIETY: ICD-10-CM

## 2023-10-30 RX ORDER — CLONAZEPAM 0.5 MG/1
0.5 TABLET ORAL NIGHTLY PRN
Qty: 30 TABLET | Refills: 0 | Status: SHIPPED | OUTPATIENT
Start: 2023-10-30 | End: 2023-11-29

## 2023-10-30 NOTE — TELEPHONE ENCOUNTER
LOV 9/29/23  RTO If sx worsen/fail; F/U scheduled  LRF 9/29/23  CSM 11/3/21    Health Maintenance   Topic Date Due    Pneumococcal 65+ years Vaccine (1 - PCV) Never done    Colorectal Cancer Screen  Never done    Breast cancer screen  Never done    Shingles vaccine (1 of 2) Never done    DEXA (modify frequency per FRAX score)  Never done    Annual Wellness Visit (AWV)  Never done    COVID-19 Vaccine (3 - Moderna series) 06/30/2021    Flu vaccine (1) Never done    Depression Screen  05/15/2024    Diabetes screen  08/13/2024    Lipids  08/13/2026    DTaP/Tdap/Td vaccine (2 - Td or Tdap) 09/28/2030    Hepatitis A vaccine  Aged Out    Hepatitis B vaccine  Aged Out    Hib vaccine  Aged Out    Meningococcal (ACWY) vaccine  Aged Out    Hepatitis C screen  Discontinued    HIV screen  Discontinued             (applicable per patient's age: Cancer Screenings, Depression Screening, Fall Risk Screening, Immunizations)    Hemoglobin A1C (%)   Date Value   12/14/2019 4.8     LDL Cholesterol (mg/dL)   Date Value   08/13/2021 160 (H)     AST (U/L)   Date Value   04/07/2023 20     ALT (U/L)   Date Value   04/07/2023 20     BUN (mg/dL)   Date Value   06/17/2022 14      (goal A1C is < 7)   (goal LDL is <100) need 30-50% reduction from baseline     BP Readings from Last 3 Encounters:   09/29/23 112/80   09/22/23 122/86   08/15/23 102/84    (goal /80)      All Future Testing planned in [facility]:  Lab Frequency Next Occurrence   Cortisol Total Once 05/16/2023   Dexamethasone Once 05/16/2023   CARMELITA DIGITAL SCREEN W OR WO CAD BILATERAL Once 06/13/2023   Culture, Urine Every 4 Weeks 11/24/2023, 12/22/2023, 1/19/2024, 2/16/2024, 3/15/2024, 4/12/2024, 5/10/2024, 6/7/2024, 7/5/2024, 8/2/2024, 8/30/2024, 9/27/2024       Next Visit Date:  Future Appointments   Date Time Provider [facility] [address]   11/3/2023 11:30 AM Kyleigh Rump, APRN - CNP [facility]            Patient Active Problem List:     Anxiety     Gastroesophageal

## 2023-11-07 NOTE — TELEPHONE ENCOUNTER
LOV 9/29/23   RTO n/a  LRF 5/23/23          Controlled Substance Monitoring:    Acute and Chronic Pain Monitoring:        No data to display

## 2023-11-08 RX ORDER — OMEPRAZOLE 20 MG/1
20 CAPSULE, DELAYED RELEASE ORAL DAILY
Qty: 90 CAPSULE | Refills: 3 | Status: SHIPPED | OUTPATIENT
Start: 2023-11-08

## 2023-11-17 DIAGNOSIS — Z12.31 SCREENING MAMMOGRAM FOR BREAST CANCER: ICD-10-CM

## 2023-11-26 DIAGNOSIS — F41.9 ANXIETY: ICD-10-CM

## 2023-11-27 ENCOUNTER — PATIENT MESSAGE (OUTPATIENT)
Dept: FAMILY MEDICINE CLINIC | Age: 68
End: 2023-11-27

## 2023-11-27 DIAGNOSIS — J45.909 UNCOMPLICATED ASTHMA, UNSPECIFIED ASTHMA SEVERITY, UNSPECIFIED WHETHER PERSISTENT: Primary | ICD-10-CM

## 2023-11-27 NOTE — TELEPHONE ENCOUNTER
LOV 9/29/23  RTO If sx worsen  LRF 10/03/23    Health Maintenance   Topic Date Due    Pneumococcal 65+ years Vaccine (1 - PCV) Never done    Colorectal Cancer Screen  Never done    Shingles vaccine (1 of 2) Never done    DEXA (modify frequency per FRAX score)  Never done    Annual Wellness Visit (AWV)  Never done    Flu vaccine (1) Never done    COVID-19 Vaccine (3 - 2023-24 season) 09/01/2023    Depression Screen  05/15/2024    Diabetes screen  08/13/2024    Breast cancer screen  11/13/2025    Lipids  08/13/2026    DTaP/Tdap/Td vaccine (2 - Td or Tdap) 09/28/2030    Hepatitis A vaccine  Aged Out    Hepatitis B vaccine  Aged Out    Hib vaccine  Aged Out    Meningococcal (ACWY) vaccine  Aged Out    Hepatitis C screen  Discontinued    HIV screen  Discontinued             (applicable per patient's age: Cancer Screenings, Depression Screening, Fall Risk Screening, Immunizations)    Hemoglobin A1C (%)   Date Value   12/14/2019 4.8     LDL Cholesterol (mg/dL)   Date Value   08/13/2021 160 (H)     AST (U/L)   Date Value   04/07/2023 20     ALT (U/L)   Date Value   04/07/2023 20     BUN (mg/dL)   Date Value   06/17/2022 14      (goal A1C is < 7)   (goal LDL is <100) need 30-50% reduction from baseline     BP Readings from Last 3 Encounters:   09/29/23 112/80   09/22/23 122/86   08/15/23 102/84    (goal /80)      All Future Testing planned in [facility]:  Lab Frequency Next Occurrence   Cortisol Total Once 05/16/2023   Dexamethasone Once 05/16/2023   Culture, Urine Every 4 Weeks 12/22/2023, 1/19/2024, 2/16/2024, 3/15/2024, 4/12/2024, 5/10/2024, 6/7/2024, 7/5/2024, 8/2/2024, 8/30/2024, 9/27/2024, 10/25/2024       Next Visit Date:  No future appointments.          Patient Active Problem List:     Anxiety     Gastroesophageal reflux disease     Irritable bowel syndrome     Mitral valve prolapse     Asthma     Agoraphobia     Panic disorder

## 2023-11-28 ENCOUNTER — OFFICE VISIT (OUTPATIENT)
Dept: PRIMARY CARE CLINIC | Age: 68
End: 2023-11-28
Payer: MEDICARE

## 2023-11-28 VITALS
OXYGEN SATURATION: 98 % | HEART RATE: 83 BPM | SYSTOLIC BLOOD PRESSURE: 118 MMHG | DIASTOLIC BLOOD PRESSURE: 78 MMHG | BODY MASS INDEX: 25.69 KG/M2 | WEIGHT: 164 LBS | TEMPERATURE: 97.8 F

## 2023-11-28 DIAGNOSIS — Z86.19 HISTORY OF THRUSH: ICD-10-CM

## 2023-11-28 DIAGNOSIS — R05.3 PERSISTENT COUGH: Primary | ICD-10-CM

## 2023-11-28 PROCEDURE — 3017F COLORECTAL CA SCREEN DOC REV: CPT

## 2023-11-28 PROCEDURE — G8427 DOCREV CUR MEDS BY ELIG CLIN: HCPCS

## 2023-11-28 PROCEDURE — 1123F ACP DISCUSS/DSCN MKR DOCD: CPT

## 2023-11-28 PROCEDURE — G8400 PT W/DXA NO RESULTS DOC: HCPCS

## 2023-11-28 PROCEDURE — 99214 OFFICE O/P EST MOD 30 MIN: CPT

## 2023-11-28 PROCEDURE — 1036F TOBACCO NON-USER: CPT

## 2023-11-28 PROCEDURE — G8417 CALC BMI ABV UP PARAM F/U: HCPCS

## 2023-11-28 PROCEDURE — 1090F PRES/ABSN URINE INCON ASSESS: CPT

## 2023-11-28 PROCEDURE — G8484 FLU IMMUNIZE NO ADMIN: HCPCS

## 2023-11-28 RX ORDER — PREDNISONE 20 MG/1
20 TABLET ORAL DAILY
Qty: 5 TABLET | Refills: 0 | Status: SHIPPED | OUTPATIENT
Start: 2023-11-28 | End: 2023-12-03

## 2023-11-28 RX ORDER — BUSPIRONE HYDROCHLORIDE 5 MG/1
TABLET ORAL
Qty: 180 TABLET | Refills: 1 | Status: SHIPPED | OUTPATIENT
Start: 2023-11-28 | End: 2024-11-27

## 2023-11-28 RX ORDER — BENZONATATE 200 MG/1
200 CAPSULE ORAL 3 TIMES DAILY PRN
Qty: 21 CAPSULE | Refills: 0 | Status: SHIPPED | OUTPATIENT
Start: 2023-11-28 | End: 2023-12-05

## 2023-11-28 ASSESSMENT — ENCOUNTER SYMPTOMS
RHINORRHEA: 0
SHORTNESS OF BREATH: 0
WHEEZING: 1
SORE THROAT: 0
CHEST TIGHTNESS: 0
COUGH: 1

## 2023-11-28 NOTE — PROGRESS NOTES
[address] [facility] PRIMARY CARE  [address] WALK-IN  [address] [place] [address],  [address] [address]  Dept: [phone]    Jaxson Castellano is a 76 y.o. female Established patient, who presents to the walk-in clinic today with conditions/complaints as noted below:    Chief Complaint   Patient presents with    Clayborne Albino     Notice it last night. Thinks she might got thrush from the OTC cough medication    Cough     Started couple weeks ago. Has been taking OTC medication         HPI:     Patient is a 70-year-old female that presents today for a persistent cough. Pertinent PMH includes asthma and GERD. Reports that she normally has a cough in the morning this time of the year that lasts approximately an hour; although it's been more persistent recently. Describes thick clear phlegm. She notices wheezing at night before coughing. Denies dyspnea or chest tightness/pain. She has a rescue inhaler she uses infrequently. States that she started taking over-the-counter cough medication which helped significantly. Denies any fevers, runny nose/congestion, post-nasal drainage, or sore throat. Also reports concern for oral thrush. Notes frequent history. States that the roof of her mouth started burning last night. Notes that her mouth has been dry and her lip felt swollen this morning. Denies recent antibiotics, steroids, or white patches. Her PCP sent Nystatin to her pharmacy, but she doesn't feel like it's for a long enough duration.         Past Medical History:   Diagnosis Date    Acid reflux     Agoraphobia     Asthma     Herpes     Irritable bowel syndrome     Mitral valve prolapse        Current Outpatient Medications   Medication Sig Dispense Refill    busPIRone (BUSPAR) 5 MG tablet take 1 to 2 tablets by mouth three times a day 180 tablet 1    nystatin (MYCOSTATIN) 273524 UNIT/ML

## 2023-11-28 NOTE — TELEPHONE ENCOUNTER
From: Vonda   To: Kristine Toth  Sent: 11/27/2023 10:12 PM EST  Subject: Alex Shin,,, I had a cough for a couple weeks, took cold meds & now have oral thrush. I called in a refill you set up previously but not sure its enough for 10 days which is recommended. Can a larger amount be called in? It does have more refills but not sure how quick it could be refilled.

## 2023-11-28 NOTE — PATIENT INSTRUCTIONS
Complete short course of oral steroids. Nystatin refill sent to pharmacy. Avoid cough medication at this time. Follow-up with PCP.

## 2023-11-30 RX ORDER — ALBUTEROL SULFATE 90 UG/1
2 AEROSOL, METERED RESPIRATORY (INHALATION) EVERY 6 HOURS PRN
Qty: 18 G | Refills: 0 | Status: SHIPPED | OUTPATIENT
Start: 2023-11-30

## 2024-01-10 DIAGNOSIS — F41.9 ANXIETY: ICD-10-CM

## 2024-01-13 RX ORDER — CLONAZEPAM 0.5 MG/1
0.5 TABLET ORAL NIGHTLY PRN
Qty: 30 TABLET | Refills: 0 | Status: SHIPPED | OUTPATIENT
Start: 2024-01-13 | End: 2025-02-28

## 2024-01-19 NOTE — TELEPHONE ENCOUNTER
Due to patient taking this medication on a more frequent basis she must be seen every 3 months in office.  Therefore she is due for a follow-up appointment as soon as possible.  Also noted her last 2 scheduled office visits she did cancel?.  She is due to update her contract as well.    Please call patient directly to schedule a follow-up appointment.  
Left voice message for patient to call office.  
Scheduled and notified   
Mitral valve prolapse     Asthma     Agoraphobia     Panic disorder

## 2024-01-21 DIAGNOSIS — F41.9 ANXIETY: ICD-10-CM

## 2024-01-22 RX ORDER — BUSPIRONE HYDROCHLORIDE 5 MG/1
TABLET ORAL
Qty: 180 TABLET | Refills: 1 | Status: SHIPPED | OUTPATIENT
Start: 2024-01-22

## 2024-01-22 NOTE — TELEPHONE ENCOUNTER
LOV 9/29/23   RTO 1/30/24  LRF 11/28/23          Controlled Substance Monitoring:    Acute and Chronic Pain Monitoring:        No data to display

## 2024-02-29 ENCOUNTER — OFFICE VISIT (OUTPATIENT)
Dept: FAMILY MEDICINE CLINIC | Age: 69
End: 2024-02-29
Payer: MEDICARE

## 2024-02-29 VITALS
OXYGEN SATURATION: 98 % | WEIGHT: 165.6 LBS | DIASTOLIC BLOOD PRESSURE: 72 MMHG | BODY MASS INDEX: 25.94 KG/M2 | HEART RATE: 97 BPM | SYSTOLIC BLOOD PRESSURE: 110 MMHG | TEMPERATURE: 97.6 F

## 2024-02-29 DIAGNOSIS — K21.9 GASTROESOPHAGEAL REFLUX DISEASE WITHOUT ESOPHAGITIS: ICD-10-CM

## 2024-02-29 DIAGNOSIS — F40.00 AGORAPHOBIA: ICD-10-CM

## 2024-02-29 DIAGNOSIS — Z13.6 SCREENING FOR CARDIOVASCULAR CONDITION: ICD-10-CM

## 2024-02-29 DIAGNOSIS — F41.9 ANXIETY: Primary | ICD-10-CM

## 2024-02-29 DIAGNOSIS — F41.0 PANIC DISORDER: ICD-10-CM

## 2024-02-29 DIAGNOSIS — H69.91 EUSTACHIAN TUBE DISORDER, RIGHT: ICD-10-CM

## 2024-02-29 DIAGNOSIS — Z13.31 SCREENING FOR DEPRESSION: ICD-10-CM

## 2024-02-29 DIAGNOSIS — F51.04 PSYCHOPHYSIOLOGICAL INSOMNIA: ICD-10-CM

## 2024-02-29 DIAGNOSIS — Z13.1 SCREENING FOR DIABETES MELLITUS: ICD-10-CM

## 2024-02-29 PROCEDURE — G8427 DOCREV CUR MEDS BY ELIG CLIN: HCPCS | Performed by: NURSE PRACTITIONER

## 2024-02-29 PROCEDURE — 99214 OFFICE O/P EST MOD 30 MIN: CPT | Performed by: NURSE PRACTITIONER

## 2024-02-29 PROCEDURE — G8417 CALC BMI ABV UP PARAM F/U: HCPCS | Performed by: NURSE PRACTITIONER

## 2024-02-29 PROCEDURE — 1123F ACP DISCUSS/DSCN MKR DOCD: CPT | Performed by: NURSE PRACTITIONER

## 2024-02-29 PROCEDURE — G2211 COMPLEX E/M VISIT ADD ON: HCPCS | Performed by: NURSE PRACTITIONER

## 2024-02-29 PROCEDURE — 1036F TOBACCO NON-USER: CPT | Performed by: NURSE PRACTITIONER

## 2024-02-29 PROCEDURE — 1090F PRES/ABSN URINE INCON ASSESS: CPT | Performed by: NURSE PRACTITIONER

## 2024-02-29 PROCEDURE — G8400 PT W/DXA NO RESULTS DOC: HCPCS | Performed by: NURSE PRACTITIONER

## 2024-02-29 PROCEDURE — 3017F COLORECTAL CA SCREEN DOC REV: CPT | Performed by: NURSE PRACTITIONER

## 2024-02-29 PROCEDURE — G8484 FLU IMMUNIZE NO ADMIN: HCPCS | Performed by: NURSE PRACTITIONER

## 2024-02-29 RX ORDER — FEXOFENADINE HCL 180 MG/1
180 TABLET ORAL DAILY
Qty: 90 TABLET | Refills: 1 | Status: SHIPPED | OUTPATIENT
Start: 2024-02-29 | End: 2025-02-28

## 2024-02-29 RX ORDER — AZELASTINE HYDROCHLORIDE 137 UG/1
SPRAY, METERED NASAL
Qty: 1 EACH | Refills: 5 | Status: SHIPPED | OUTPATIENT
Start: 2024-02-29

## 2024-02-29 ASSESSMENT — PATIENT HEALTH QUESTIONNAIRE - PHQ9
SUM OF ALL RESPONSES TO PHQ9 QUESTIONS 1 & 2: 0
1. LITTLE INTEREST OR PLEASURE IN DOING THINGS: 0
SUM OF ALL RESPONSES TO PHQ QUESTIONS 1-9: 0
2. FEELING DOWN, DEPRESSED OR HOPELESS: 0
SUM OF ALL RESPONSES TO PHQ QUESTIONS 1-9: 0

## 2024-02-29 ASSESSMENT — ENCOUNTER SYMPTOMS
ABDOMINAL PAIN: 0
DIARRHEA: 0
SHORTNESS OF BREATH: 0
CHEST TIGHTNESS: 0
COUGH: 0
CONSTIPATION: 0
ABDOMINAL DISTENTION: 0
BLOOD IN STOOL: 0
BACK PAIN: 0

## 2024-02-29 NOTE — PROGRESS NOTES
[facility]   [address] [place] [address]  [phone]    2/29/24     Patient ID  Griselda Figueroa is a 68 y.o. female  Established patient    Chief Complaint  Griselda Figueroa presents today for No chief complaint on file.      ASSESSMENT/PLAN  1. Anxiety  -     TSH; Future  -     T4, Free; Future  2. Panic disorder  3. Eustachian tube disorder, right  -     fexofenadine (ALLEGRA) 180 MG tablet; Take 1 tablet by mouth daily, Disp-90 tablet, R-1Normal  -     Azelastine HCl 137 MCG/SPRAY SOLN; spray 1 to 2 sprays into each nostril twice a day, Disp-1 each, R-5Normal  4. Gastroesophageal reflux disease without esophagitis  5. Agoraphobia  6. Psychophysiological insomnia  7. Screening for cardiovascular condition  -     Comprehensive Metabolic Panel, Fasting; Future  -     CBC with Auto Differential; Future  -     Lipid Panel; Future  -     Magnesium; Future  8. Screening for diabetes mellitus  -     Comprehensive Metabolic Panel, Fasting; Future  -     Magnesium; Future  9. Screening for depression     Routine labs ordered  Medications refilled.  No changes in pharmaology.  Contract updated       Return in about 4 months (around 6/29/2024) for AWV to be scheduled with next OV - 2 appts.      Patient Care Team:  Kristine Ferguson APRN - CNP as PCP - General (Nurse Practitioner Family)  Kristine Ferguson APRN - CNP as PCP - Empaneled Provider    SUBJECTIVE/OBJECTIVE  History of Present illness / Visit Summary   Patient presents today for follow up   Reviewed health maintenance and any recent labs/imaging      New dentist -   Tooth with cap fell out when removed mouth guard         Review of Systems  Review of Systems   Constitutional:  Negative for activity change, appetite change, chills, fatigue and fever.   Respiratory:  Negative for cough, chest tightness and shortness of breath.    Cardiovascular:  Negative for chest pain, palpitations and leg swelling.   Gastrointestinal:  
06-Oct-2019

## 2024-04-18 ENCOUNTER — HOSPITAL ENCOUNTER (OUTPATIENT)
Age: 69
Setting detail: SPECIMEN
Discharge: HOME | End: 2024-04-18

## 2024-04-18 DIAGNOSIS — F41.9 ANXIETY: ICD-10-CM

## 2024-04-18 DIAGNOSIS — Z13.1 SCREENING FOR DIABETES MELLITUS: ICD-10-CM

## 2024-04-18 DIAGNOSIS — Z13.6 SCREENING FOR CARDIOVASCULAR CONDITION: ICD-10-CM

## 2024-04-18 LAB
ALBUMIN SERPL-MCNC: 4.3 G/DL (ref 3.5–5.2)
ALBUMIN/GLOB SERPL: 2 {RATIO} (ref 1–2.5)
ALP SERPL-CCNC: 79 U/L (ref 35–104)
ALT SERPL-CCNC: 17 U/L (ref 10–35)
ANION GAP SERPL CALCULATED.3IONS-SCNC: 10 MMOL/L (ref 9–16)
AST SERPL-CCNC: 22 U/L (ref 10–35)
BASOPHILS # BLD: 0.04 K/UL (ref 0–0.2)
BASOPHILS NFR BLD: 1 % (ref 0–2)
BILIRUB SERPL-MCNC: 0.5 MG/DL (ref 0–1.2)
BUN SERPL-MCNC: 19 MG/DL (ref 8–23)
CALCIUM SERPL-MCNC: 9.1 MG/DL (ref 8.6–10.4)
CHLORIDE SERPL-SCNC: 107 MMOL/L (ref 98–107)
CHOLEST SERPL-MCNC: 228 MG/DL (ref 0–199)
CHOLESTEROL/HDL RATIO: 3
CO2 SERPL-SCNC: 27 MMOL/L (ref 20–31)
CREAT SERPL-MCNC: 0.8 MG/DL (ref 0.5–0.9)
EOSINOPHIL # BLD: 0.4 K/UL (ref 0–0.44)
EOSINOPHILS RELATIVE PERCENT: 7 % (ref 1–4)
ERYTHROCYTE [DISTWIDTH] IN BLOOD BY AUTOMATED COUNT: 12.3 % (ref 11.8–14.4)
GFR SERPL CREATININE-BSD FRML MDRD: 76 ML/MIN/1.73M2
GLUCOSE P FAST SERPL-MCNC: 94 MG/DL (ref 74–99)
HCT VFR BLD AUTO: 44.5 % (ref 36.3–47.1)
HDLC SERPL-MCNC: 68 MG/DL
HGB BLD-MCNC: 14.5 G/DL (ref 11.9–15.1)
IMM GRANULOCYTES # BLD AUTO: <0.03 K/UL (ref 0–0.3)
IMM GRANULOCYTES NFR BLD: 0 %
LDLC SERPL CALC-MCNC: 143 MG/DL (ref 0–100)
LYMPHOCYTES NFR BLD: 2.67 K/UL (ref 1.1–3.7)
LYMPHOCYTES RELATIVE PERCENT: 42 % (ref 24–43)
MAGNESIUM SERPL-MCNC: 2.2 MG/DL (ref 1.6–2.4)
MCH RBC QN AUTO: 30.5 PG (ref 25.2–33.5)
MCHC RBC AUTO-ENTMCNC: 32.6 G/DL (ref 28.4–34.8)
MCV RBC AUTO: 93.5 FL (ref 82.6–102.9)
MONOCYTES NFR BLD: 0.41 K/UL (ref 0.1–1.2)
MONOCYTES NFR BLD: 7 % (ref 3–12)
NEUTROPHILS NFR BLD: 43 % (ref 36–65)
NEUTS SEG NFR BLD: 2.62 K/UL (ref 1.5–8.1)
NRBC BLD-RTO: 0 PER 100 WBC
PLATELET # BLD AUTO: 270 K/UL (ref 138–453)
PMV BLD AUTO: 11.5 FL (ref 8.1–13.5)
POTASSIUM SERPL-SCNC: 4.4 MMOL/L (ref 3.7–5.3)
PROT SERPL-MCNC: 7.1 G/DL (ref 6.6–8.7)
RBC # BLD AUTO: 4.76 M/UL (ref 3.95–5.11)
SODIUM SERPL-SCNC: 144 MMOL/L (ref 136–145)
T4 FREE SERPL-MCNC: 1.1 NG/DL (ref 0.92–1.68)
TRIGL SERPL-MCNC: 84 MG/DL
TSH SERPL DL<=0.05 MIU/L-ACNC: 1.6 UIU/ML (ref 0.27–4.2)
VLDLC SERPL CALC-MCNC: 17 MG/DL
WBC OTHER # BLD: 6.2 K/UL (ref 3.5–11.3)

## 2024-05-05 ENCOUNTER — PATIENT MESSAGE (OUTPATIENT)
Dept: FAMILY MEDICINE CLINIC | Age: 69
End: 2024-05-05

## 2024-05-05 DIAGNOSIS — N89.8 VAGINAL DISCHARGE: Primary | ICD-10-CM

## 2024-05-06 ENCOUNTER — HOSPITAL ENCOUNTER (OUTPATIENT)
Age: 69
Setting detail: SPECIMEN
Discharge: HOME | End: 2024-05-06

## 2024-05-06 ENCOUNTER — OFFICE VISIT (OUTPATIENT)
Dept: PRIMARY CARE CLINIC | Age: 69
End: 2024-05-06
Payer: MEDICARE

## 2024-05-06 VITALS
DIASTOLIC BLOOD PRESSURE: 78 MMHG | BODY MASS INDEX: 25.53 KG/M2 | OXYGEN SATURATION: 99 % | WEIGHT: 163 LBS | TEMPERATURE: 98.8 F | HEART RATE: 88 BPM | SYSTOLIC BLOOD PRESSURE: 124 MMHG

## 2024-05-06 DIAGNOSIS — N94.9 VAGINAL BURNING: ICD-10-CM

## 2024-05-06 DIAGNOSIS — R35.0 URINARY FREQUENCY: ICD-10-CM

## 2024-05-06 DIAGNOSIS — N89.8 VAGINAL IRRITATION: Primary | ICD-10-CM

## 2024-05-06 DIAGNOSIS — N89.8 VAGINAL IRRITATION: ICD-10-CM

## 2024-05-06 LAB
BILIRUBIN, POC: NEGATIVE
BLOOD URINE, POC: NORMAL
CANDIDA SPECIES: NEGATIVE
CLARITY, POC: CLEAR
COLOR, POC: NORMAL
GARDNERELLA VAGINALIS: NEGATIVE
GLUCOSE URINE, POC: NEGATIVE
KETONES, POC: NEGATIVE
LEUKOCYTE EST, POC: NEGATIVE
NITRITE, POC: NEGATIVE
PH, POC: 5.5
PROTEIN, POC: NEGATIVE
SOURCE: NORMAL
SPECIFIC GRAVITY, POC: 1.02
TRICHOMONAS: NEGATIVE
UROBILINOGEN, POC: 0.2

## 2024-05-06 PROCEDURE — G8417 CALC BMI ABV UP PARAM F/U: HCPCS | Performed by: PHYSICIAN ASSISTANT

## 2024-05-06 PROCEDURE — 1090F PRES/ABSN URINE INCON ASSESS: CPT | Performed by: PHYSICIAN ASSISTANT

## 2024-05-06 PROCEDURE — 99213 OFFICE O/P EST LOW 20 MIN: CPT | Performed by: PHYSICIAN ASSISTANT

## 2024-05-06 PROCEDURE — 81003 URINALYSIS AUTO W/O SCOPE: CPT | Performed by: PHYSICIAN ASSISTANT

## 2024-05-06 PROCEDURE — G8427 DOCREV CUR MEDS BY ELIG CLIN: HCPCS | Performed by: PHYSICIAN ASSISTANT

## 2024-05-06 PROCEDURE — 1123F ACP DISCUSS/DSCN MKR DOCD: CPT | Performed by: PHYSICIAN ASSISTANT

## 2024-05-06 PROCEDURE — G8400 PT W/DXA NO RESULTS DOC: HCPCS | Performed by: PHYSICIAN ASSISTANT

## 2024-05-06 PROCEDURE — 1036F TOBACCO NON-USER: CPT | Performed by: PHYSICIAN ASSISTANT

## 2024-05-06 PROCEDURE — 3017F COLORECTAL CA SCREEN DOC REV: CPT | Performed by: PHYSICIAN ASSISTANT

## 2024-05-06 RX ORDER — ESTRADIOL 0.1 MG/G
CREAM VAGINAL
COMMUNITY
Start: 2024-04-23

## 2024-05-06 RX ORDER — CIPROFLOXACIN 500 MG/1
500 TABLET, FILM COATED ORAL 2 TIMES DAILY
Qty: 14 TABLET | Refills: 0 | Status: SHIPPED | OUTPATIENT
Start: 2024-05-06 | End: 2024-05-13

## 2024-05-06 ASSESSMENT — ENCOUNTER SYMPTOMS
RESPIRATORY NEGATIVE: 1
NAUSEA: 0
EYES NEGATIVE: 1

## 2024-05-06 NOTE — TELEPHONE ENCOUNTER
From: Griselda Figueroa  To: Kristine Ferguson  Sent: 5/5/2024 5:50 PM EDT  Subject: Lab    Hi, I started feeling symptoms of a yeast infection / or uti,,, thinking its yeast I had a 150mg diflocan left from last time. In case I don't feel better, can you send to lab for swab & urine test? Thank you.

## 2024-05-06 NOTE — PROGRESS NOTES
[facility] Walk In  [address] [place] [address]  Phone: [phone]  Fax: [phone]       [facility] WALK - IN    Pt Name: Griselda Figueroa  MRN: [id number]  Birthdate 1955  Date of evaluation: 5/6/2024  Provider: Marga St PA-C     CHIEF COMPLAINT       Chief Complaint   Patient presents with    Vaginal Pain     Vaginal burning & urinary frequency for a couple days.            HISTORY OF PRESENT ILLNESS  (Location/Symptom, Timing/Onset, Context/Setting, Quality, Duration, Modifying Factors, Severity.)   Griselda Figueroa is a 69 y.o. White (non-) [1] female who presents to the office for evaluation of      Dysuria   This is a new problem. The current episode started in the past 7 days. The quality of the pain is described as burning. There has been no fever. Associated symptoms include frequency and urgency. Pertinent negatives include no chills, discharge, flank pain, hematuria, hesitancy or nausea.       Nursing Notes were reviewed.    REVIEW OF SYSTEMS    (2-9 systems for level 4, 10 or more for level 5)     Review of Systems   Constitutional:  Positive for fatigue. Negative for chills, diaphoresis and fever.   HENT: Negative.  Negative for congestion.    Eyes: Negative.    Respiratory: Negative.     Cardiovascular: Negative.    Gastrointestinal:  Negative for nausea.   Genitourinary:  Positive for dysuria, frequency and urgency. Negative for decreased urine volume, flank pain, genital sores, hematuria, hesitancy, menstrual problem, pelvic pain, vaginal bleeding, vaginal discharge and vaginal pain.   Musculoskeletal: Negative.    Skin: Negative.          Except as noted above the remainder of the review of systems was reviewed andnegative.       PAST MEDICAL HISTORY   History reviewed.    Past Medical History:   Diagnosis Date    Acid reflux     Agoraphobia     Asthma     Herpes     Irritable bowel syndrome     Mitral valve prolapse          SURGICAL HISTORY     History

## 2024-05-07 LAB
MICROORGANISM SPEC CULT: NORMAL
SPECIMEN DESCRIPTION: NORMAL

## 2024-05-08 ENCOUNTER — TELEPHONE (OUTPATIENT)
Dept: FAMILY MEDICINE CLINIC | Age: 69
End: 2024-05-08

## 2024-05-08 NOTE — TELEPHONE ENCOUNTER
Patient urine culture showed no significant growth & vaginitis swab was negative. Patient asked if she could discontinue the Cipro due to urine culture negative for bacterial infection and not helping with symptoms. Patient advised she could discontinue Cipro and writer will send message to [place] provider about thrush symptoms. Patient states she is experiencing tongue burning and swelling. Patient states these are the symptoms she normally gets with oral thrush. Patient took an old Diflucan on 4/5 and requesting a new script if possible. Patient has reached out to her OB/GYN on her current symptoms vaginal burning & frequent urination. They discussed increasing her estradiol 3 times a week instead of twice a week.

## 2024-05-08 NOTE — TELEPHONE ENCOUNTER
Patient was seen at the walk-in 5/6/24, she started diflucan on Sunday 5/5/24. She was told to call if still having symptoms. She is having burning, frequent urination. She states it subsided a little bit but it is still there. Should she take something over the counter or does she need a new script.     Pharmacy is [facility]

## 2024-05-09 RX ORDER — FLUCONAZOLE 150 MG/1
150 TABLET ORAL
Qty: 2 TABLET | Refills: 0 | Status: SHIPPED | OUTPATIENT
Start: 2024-05-09 | End: 2024-05-15

## 2024-05-09 NOTE — TELEPHONE ENCOUNTER
Patient called stating the OB/GYN stated the Diflucan could irritate things more & suggested patient try and get oral Nystatin for the thrush. OB/GYN couldn't prescribe medication due to not evaluated patient.

## 2024-05-10 ENCOUNTER — HOSPITAL ENCOUNTER (OUTPATIENT)
Age: 69
Setting detail: SPECIMEN
Discharge: HOME | End: 2024-05-10

## 2024-05-10 ENCOUNTER — OFFICE VISIT (OUTPATIENT)
Dept: PRIMARY CARE CLINIC | Age: 69
End: 2024-05-10
Payer: MEDICARE

## 2024-05-10 ENCOUNTER — TELEPHONE (OUTPATIENT)
Dept: FAMILY MEDICINE CLINIC | Age: 69
End: 2024-05-10

## 2024-05-10 VITALS
BODY MASS INDEX: 26 KG/M2 | DIASTOLIC BLOOD PRESSURE: 82 MMHG | TEMPERATURE: 98.2 F | WEIGHT: 166 LBS | SYSTOLIC BLOOD PRESSURE: 130 MMHG | HEART RATE: 75 BPM | OXYGEN SATURATION: 98 %

## 2024-05-10 DIAGNOSIS — N94.9 VAGINAL BURNING: ICD-10-CM

## 2024-05-10 DIAGNOSIS — R35.0 URINARY FREQUENCY: ICD-10-CM

## 2024-05-10 DIAGNOSIS — R35.0 URINARY FREQUENCY: Primary | ICD-10-CM

## 2024-05-10 LAB
BILIRUBIN, POC: NEGATIVE
BLOOD URINE, POC: NEGATIVE
CANDIDA SPECIES: NEGATIVE
CLARITY, POC: CLEAR
COLOR, POC: NORMAL
GARDNERELLA VAGINALIS: NEGATIVE
GLUCOSE URINE, POC: NEGATIVE
KETONES, POC: NEGATIVE
LEUKOCYTE EST, POC: NEGATIVE
NITRITE, POC: NEGATIVE
PH, POC: 5.5
PROTEIN, POC: NEGATIVE
SOURCE: NORMAL
SPECIFIC GRAVITY, POC: 1.01
TRICHOMONAS: NEGATIVE
UROBILINOGEN, POC: 0.2

## 2024-05-10 PROCEDURE — 3017F COLORECTAL CA SCREEN DOC REV: CPT | Performed by: PHYSICIAN ASSISTANT

## 2024-05-10 PROCEDURE — 99213 OFFICE O/P EST LOW 20 MIN: CPT | Performed by: PHYSICIAN ASSISTANT

## 2024-05-10 PROCEDURE — 1123F ACP DISCUSS/DSCN MKR DOCD: CPT | Performed by: PHYSICIAN ASSISTANT

## 2024-05-10 PROCEDURE — 1036F TOBACCO NON-USER: CPT | Performed by: PHYSICIAN ASSISTANT

## 2024-05-10 PROCEDURE — 81003 URINALYSIS AUTO W/O SCOPE: CPT | Performed by: PHYSICIAN ASSISTANT

## 2024-05-10 PROCEDURE — G8427 DOCREV CUR MEDS BY ELIG CLIN: HCPCS | Performed by: PHYSICIAN ASSISTANT

## 2024-05-10 PROCEDURE — G8417 CALC BMI ABV UP PARAM F/U: HCPCS | Performed by: PHYSICIAN ASSISTANT

## 2024-05-10 PROCEDURE — G8400 PT W/DXA NO RESULTS DOC: HCPCS | Performed by: PHYSICIAN ASSISTANT

## 2024-05-10 PROCEDURE — 1090F PRES/ABSN URINE INCON ASSESS: CPT | Performed by: PHYSICIAN ASSISTANT

## 2024-05-10 ASSESSMENT — ENCOUNTER SYMPTOMS
RESPIRATORY NEGATIVE: 1
GASTROINTESTINAL NEGATIVE: 1

## 2024-05-10 NOTE — TELEPHONE ENCOUNTER
Patient called in stating that she was by ranjana 5/6 and was evaluated for vaginal irritation. She states that she is still having burning sensation. She just started the diflucan this morning and has not relief. She stated last time she had this she had a yeast infection she is wondering if this maybe what it is.

## 2024-05-10 NOTE — PROGRESS NOTES
[facility] Walk In  [address] [place] [address]  Phone: [phone]  Fax: [phone]       [facility] WALK - IN    Pt Name: Griselda Figueroa  MRN: [id number]  Birthdate 1955  Date of evaluation: 5/10/2024  Provider: Marga St PA-C     CHIEF COMPLAINT       Chief Complaint   Patient presents with    Vaginal Pain           HISTORY OF PRESENT ILLNESS  (Location/Symptom, Timing/Onset, Context/Setting, Quality, Duration, Modifying Factors, Severity.)   Griselda Figueroa is a 69 y.o. White (non-) [1] female who presents to the office for evaluation of      Patient is currently being treated and managed by OB/GYN for Atrophic Vagnitis    Vaginal Pain  The patient's primary symptoms include genital itching. The patient's pertinent negatives include no genital lesions (- appointment scheduled with OC/GYN next week), genital odor, genital rash, missed menses, pelvic pain, vaginal bleeding or vaginal discharge. This is a recurrent problem. The problem has been waxing and waning. Pertinent negatives include no dysuria, flank pain, frequency, hematuria or urgency. There has been no bleeding. She has not been passing clots. She has not been passing tissue. The symptoms are aggravated by urinating. She has tried antifungals and antibiotics (estrogen) for the symptoms. The treatment provided no relief.       Nursing Notes were reviewed.    REVIEW OF SYSTEMS    (2-9 systems for level 4, 10 or more for level 5)     Review of Systems   Constitutional: Negative.    HENT: Negative.     Respiratory: Negative.     Cardiovascular: Negative.    Gastrointestinal: Negative.    Genitourinary:  Positive for vaginal pain. Negative for decreased urine volume, difficulty urinating, dyspareunia, dysuria, enuresis, flank pain, frequency, genital sores, hematuria, menstrual problem, missed menses, pelvic pain, urgency, vaginal bleeding and vaginal discharge.   Skin: Negative.          Except as noted above

## 2024-05-11 LAB
MICROORGANISM SPEC CULT: NORMAL
SPECIMEN DESCRIPTION: NORMAL

## 2024-06-06 PROBLEM — R33.9 INCOMPLETE EMPTYING OF BLADDER: Status: ACTIVE | Noted: 2024-06-06

## 2024-06-06 PROBLEM — R35.0 FREQUENT URINATION: Status: ACTIVE | Noted: 2024-06-06

## 2024-06-06 PROBLEM — R30.0 BURNING WITH URINATION: Status: ACTIVE | Noted: 2024-06-06

## 2024-06-06 PROBLEM — R61 NIGHT SWEATS: Status: ACTIVE | Noted: 2024-06-06

## 2024-06-06 PROBLEM — L29.2 VULVAR ITCHING: Status: ACTIVE | Noted: 2024-06-06

## 2024-06-06 PROBLEM — R23.2 HOT FLASHES: Status: ACTIVE | Noted: 2024-06-06

## 2024-06-12 ENCOUNTER — OFFICE VISIT (OUTPATIENT)
Age: 69
End: 2024-06-12

## 2024-06-12 VITALS
TEMPERATURE: 97.8 F | HEIGHT: 67 IN | DIASTOLIC BLOOD PRESSURE: 76 MMHG | BODY MASS INDEX: 25.87 KG/M2 | HEART RATE: 69 BPM | WEIGHT: 164.8 LBS | SYSTOLIC BLOOD PRESSURE: 124 MMHG | OXYGEN SATURATION: 99 %

## 2024-06-12 DIAGNOSIS — R31.21 ASYMPTOMATIC MICROSCOPIC HEMATURIA: ICD-10-CM

## 2024-06-12 DIAGNOSIS — L98.9 ABNORMAL SKIN OF VULVA: ICD-10-CM

## 2024-06-12 DIAGNOSIS — R35.0 URINARY FREQUENCY: ICD-10-CM

## 2024-06-12 DIAGNOSIS — R33.9 INCOMPLETE EMPTYING OF BLADDER: ICD-10-CM

## 2024-06-12 DIAGNOSIS — R39.15 URGENCY OF URINATION: ICD-10-CM

## 2024-06-12 DIAGNOSIS — L29.2 VULVAR ITCHING: Primary | ICD-10-CM

## 2024-06-12 DIAGNOSIS — R35.0 FREQUENT URINATION: ICD-10-CM

## 2024-06-12 DIAGNOSIS — R30.0 BURNING WITH URINATION: ICD-10-CM

## 2024-06-12 LAB
BILIRUBIN, POC: ABNORMAL
BLOOD URINE, POC: ABNORMAL
CLARITY, POC: ABNORMAL
COLOR, POC: ABNORMAL
GLUCOSE URINE, POC: ABNORMAL
KETONES, POC: ABNORMAL
LEUKOCYTE EST, POC: ABNORMAL
NITRITE, POC: ABNORMAL
PH, POC: 5
PROTEIN, POC: ABNORMAL
UROBILINOGEN, POC: ABNORMAL

## 2024-06-12 RX ORDER — CLOBETASOL PROPIONATE 0.5 MG/G
OINTMENT TOPICAL
COMMUNITY
Start: 2024-05-14

## 2024-06-12 RX ORDER — CLOBETASOL PROPIONATE 0.5 MG/G
CREAM TOPICAL
Qty: 30 G | Refills: 0 | Status: SHIPPED | OUTPATIENT
Start: 2024-06-12

## 2024-06-12 ASSESSMENT — ENCOUNTER SYMPTOMS
FACIAL SWELLING: 0
COUGH: 0
NAUSEA: 0
ABDOMINAL PAIN: 0
VOMITING: 0
RECTAL PAIN: 0
CONSTIPATION: 0
VOICE CHANGE: 0
DIARRHEA: 0
TROUBLE SWALLOWING: 0
BLOOD IN STOOL: 0
GASTROINTESTINAL NEGATIVE: 1
SHORTNESS OF BREATH: 0
CHEST TIGHTNESS: 0

## 2024-06-12 NOTE — PATIENT INSTRUCTIONS
Try psyllium husk for constipation     Interstitial Cystitis   (IC; Painful Bladder Syndrome) Pronounced: In-tur-STI-shul sis-TY-tis     Definition   Interstitial cystitis is chronic inflammation of the wall of the bladder. Inflammation can lead to scarring, pinpoint bleeding of the bladder wall, and decreased space to hold urine. Although the symptoms are similar to those of a bladder infection , there is usually no clear cause.     2011 [employer]     Causes   Because bacteria, fungi, or viruses are rarely found in the urine of people with interstitial cystitis, the cause is unclear. Possible causes include:   An autoimmune response that occurs following a bacterial infection of the bladder   Bacteria that cling too tightly to the wall of the bladder   A leaky inner lining of the bladder that allows irritating substances in the urine to come into contact with the bladder wall     Risk Factors   A risk factor is something that increases your chance of getting a disease or condition. Risk factors for interstitial cystitis include:   Sex: seen in women nine times more often than in men   Race: 90% of cases occur in Caucasians   Genetics: higher rate in first degree relatives   Stress   Associated pain disorders: fibromyalgia or chronic fatigue syndrome   History of childhood bladder problems   Irritable bowel syndrome     Symptoms   The symptoms of interstitial cystitis vary from person to person. They can also occur in cycles. Some people experience periods of intense symptoms followed by periods of remission. Pain can be severe enough to keep people from working or even walking.     Symptoms can include:   Discomfort, pain, or pressure in the bladder or pelvic area when the bladder is full and relief when the bladder is emptied   Urgent need to urinate   Frequent need to urinate (up to 60 times per day in severe cases)   Pain during and after intercourse or during orgasms   Blood and pus in the

## 2024-06-12 NOTE — PROGRESS NOTES
necessary.      ROS:  Review of Systems   Constitutional:  Negative for fatigue, fever and unexpected weight change.   HENT:  Negative for ear pain, facial swelling, trouble swallowing and voice change.    Respiratory:  Negative for cough, chest tightness and shortness of breath.    Cardiovascular:  Negative for chest pain, palpitations and leg swelling.   Gastrointestinal: Negative.  Negative for abdominal pain, blood in stool, constipation, diarrhea, nausea, rectal pain and vomiting.   Endocrine: Negative for cold intolerance and heat intolerance.   Genitourinary:  Positive for dysuria, frequency and urgency. Negative for pelvic pain, vaginal bleeding, vaginal discharge and vaginal pain.   Musculoskeletal:  Negative for gait problem and joint swelling.   Skin:  Negative for pallor and rash.   Neurological:  Negative for dizziness, weakness and headaches.       PHYSICAL EXAM:  /76 (Site: Left Upper Arm, Position: Sitting, Cuff Size: Small Adult)   Pulse 69   Temp 97.8 °F (36.6 °C)   Ht 1.702 m (5' 7\")   Wt 74.8 kg (164 lb 12.8 oz)   SpO2 99%   BMI 25.81 kg/m²     Physical Exam  Constitutional:       Appearance: Normal appearance. She is normal weight.   Genitourinary:      Bladder and urethral meatus normal.      No lesions in the vagina.      Genitourinary Comments: Pale tissue bilaterally on labia majora near perineum, area marked with box. Vulva slightly erythematous, no visible excoriations.       Right Labia: No rash or lesions.     Left Labia: No lesions or rash.           No vaginal tenderness.      No vaginal prolapse present.     Moderate vaginal atrophy present.       Right Adnexa: not tender and no mass present.     Left Adnexa: not tender and no mass present.     No cervical lesion.      Uterus is not enlarged or tender.      No uterine mass detected.     Urethral meatus caruncle not present.     No urethral tenderness or mass present.      Bladder is not tender, urgency on palpation not

## 2024-06-14 ENCOUNTER — TELEPHONE (OUTPATIENT)
Age: 69
End: 2024-06-14

## 2024-06-14 NOTE — TELEPHONE ENCOUNTER
Patient called to give you an update on her care. Patient applied the Estrogen cream and then the next day she has increased burning and pain in the vaginal area. Patient states that this happened to her last Sept. When she was using yuvafem with no trouble but then all of a sudden she started to have burning and pain only when using the yuvafem. Patient then switched to the Estradiol and was not having any trouble until now. Patient has been doing some research and found that Estrogen and histamines can have a reaction. Patient feels she has a lot of histamines in her system because when she was taking allegra she had no trouble. Patient is not going to use the Estrogen until she hears back from you. Patient also c/o of external burning from vaginal d/c after using the cream. Patient is using the clobetasol for the external and does notice improvement with that. Patient wanted to know if it possible to develop an allergy to the cream even though she has used it in the past with no issues.

## 2024-06-17 NOTE — TELEPHONE ENCOUNTER
Patient called to give you an update on her care. Patient applied the Estrogen cream and then the next day she has increased burning and pain in the vaginal area. Patient states that this happened to her last Sept. When she was using yuvafem with no trouble but then all of a sudden she started to have burning and pain only when using the yuvafem. Patient then switched to the Estradiol and was not having any trouble until now. Patient has been doing some research and found that Estrogen and histamines can have a reaction. Patient feels she has a lot of histamines in her system because when she was taking allegra she had no trouble. Patient is not going to use the Estrogen until she hears back from you. Patient also c/o of external burning from vaginal d/c after using the cream. Patient is using the clobetasol for the external and does notice improvement with that. Patient wanted to know if it possible to develop an allergy to the cream even though she has used it in the past with no issues.                   I would hold the vaginal estradiol until her symptoms improve. She may have increased histamine in her system and using estradiol products may increase histamine more. She can try to go back on an antihistamine if she felt that was previously helpful. Once her symptoms subside, she may try again to use in very small amounts on other areas of the body first. If she continues to have irritation, she may benefit from going to a compounding pharmacy to make a estradiol cream with a different base to it, a base that may be less irritating. IF she would like to avoid estradiol all together, I recommend non-hormonal options, e.g. Replens, vaginal hyaluronic acid or MLT.                 Patient called to RTN call to her about estrogen usage. Informed patient of Adry Beth CNP note stating she can try to take an histamine along with her estrogen to see if that helps with her symptoms. Patient stated she called her

## 2024-06-18 DIAGNOSIS — N95.2 POSTMENOPAUSAL ATROPHIC VAGINITIS: Primary | ICD-10-CM

## 2024-06-18 RX ORDER — ESTRADIOL 4 UG/1
1 INSERT VAGINAL
Qty: 8 EACH | Refills: 2 | Status: SHIPPED | OUTPATIENT
Start: 2024-06-18

## 2024-06-25 ENCOUNTER — TELEPHONE (OUTPATIENT)
Age: 69
End: 2024-06-25

## 2024-06-25 ENCOUNTER — OFFICE VISIT (OUTPATIENT)
Dept: FAMILY MEDICINE CLINIC | Age: 69
End: 2024-06-25
Payer: MEDICARE

## 2024-06-25 VITALS
HEART RATE: 108 BPM | DIASTOLIC BLOOD PRESSURE: 80 MMHG | TEMPERATURE: 96.9 F | WEIGHT: 166.8 LBS | RESPIRATION RATE: 14 BRPM | SYSTOLIC BLOOD PRESSURE: 110 MMHG | BODY MASS INDEX: 26.12 KG/M2 | OXYGEN SATURATION: 98 %

## 2024-06-25 DIAGNOSIS — I34.1 MITRAL VALVE PROLAPSE: ICD-10-CM

## 2024-06-25 DIAGNOSIS — K21.9 GASTROESOPHAGEAL REFLUX DISEASE WITHOUT ESOPHAGITIS: ICD-10-CM

## 2024-06-25 DIAGNOSIS — F41.9 ANXIETY: Primary | ICD-10-CM

## 2024-06-25 PROCEDURE — 1090F PRES/ABSN URINE INCON ASSESS: CPT | Performed by: NURSE PRACTITIONER

## 2024-06-25 PROCEDURE — 3017F COLORECTAL CA SCREEN DOC REV: CPT | Performed by: NURSE PRACTITIONER

## 2024-06-25 PROCEDURE — 1036F TOBACCO NON-USER: CPT | Performed by: NURSE PRACTITIONER

## 2024-06-25 PROCEDURE — G8417 CALC BMI ABV UP PARAM F/U: HCPCS | Performed by: NURSE PRACTITIONER

## 2024-06-25 PROCEDURE — G8427 DOCREV CUR MEDS BY ELIG CLIN: HCPCS | Performed by: NURSE PRACTITIONER

## 2024-06-25 PROCEDURE — G8400 PT W/DXA NO RESULTS DOC: HCPCS | Performed by: NURSE PRACTITIONER

## 2024-06-25 PROCEDURE — 1123F ACP DISCUSS/DSCN MKR DOCD: CPT | Performed by: NURSE PRACTITIONER

## 2024-06-25 PROCEDURE — 99213 OFFICE O/P EST LOW 20 MIN: CPT | Performed by: NURSE PRACTITIONER

## 2024-06-25 RX ORDER — ESTRADIOL 4 UG/1
4 INSERT VAGINAL
Qty: 8 EACH | Refills: 3 | Status: SHIPPED | OUTPATIENT
Start: 2024-06-27

## 2024-06-25 RX ORDER — FAMOTIDINE 20 MG/1
20 TABLET, FILM COATED ORAL 2 TIMES DAILY
Qty: 60 TABLET | Refills: 3 | COMMUNITY
Start: 2024-06-25

## 2024-06-25 SDOH — ECONOMIC STABILITY: FOOD INSECURITY: WITHIN THE PAST 12 MONTHS, THE FOOD YOU BOUGHT JUST DIDN'T LAST AND YOU DIDN'T HAVE MONEY TO GET MORE.: PATIENT DECLINED

## 2024-06-25 SDOH — ECONOMIC STABILITY: INCOME INSECURITY: HOW HARD IS IT FOR YOU TO PAY FOR THE VERY BASICS LIKE FOOD, HOUSING, MEDICAL CARE, AND HEATING?: PATIENT DECLINED

## 2024-06-25 SDOH — ECONOMIC STABILITY: HOUSING INSECURITY
IN THE LAST 12 MONTHS, WAS THERE A TIME WHEN YOU DID NOT HAVE A STEADY PLACE TO SLEEP OR SLEPT IN A SHELTER (INCLUDING NOW)?: PATIENT DECLINED

## 2024-06-25 SDOH — ECONOMIC STABILITY: FOOD INSECURITY: WITHIN THE PAST 12 MONTHS, YOU WORRIED THAT YOUR FOOD WOULD RUN OUT BEFORE YOU GOT MONEY TO BUY MORE.: PATIENT DECLINED

## 2024-06-25 ASSESSMENT — ENCOUNTER SYMPTOMS
ABDOMINAL PAIN: 0
BLOOD IN STOOL: 0
ABDOMINAL DISTENTION: 0
CHEST TIGHTNESS: 0
DIARRHEA: 0
BACK PAIN: 0
COUGH: 0
SHORTNESS OF BREATH: 0
CONSTIPATION: 0

## 2024-06-25 NOTE — TELEPHONE ENCOUNTER
Obtained prior auth approval through [email or web address] for Imvexxy 4mcg inserts, Qty 8 per 30 days to [facility] in [place]. Please send in new Rx

## 2024-06-25 NOTE — PROGRESS NOTES
[facility]   [address] [address]  [phone]    6/25/24     Patient ID  Griselda Figueroa is a 69 y.o. female  Established patient    Chief Complaint  Griselda Figueroa presents today for Anxiety, Insomnia, Bladder Problem (UTI/yeast infection sx's- patient is seeing urogynecology ), Otalgia (LT side. Onset 1 week. Humming in her ear. ), and Gastroesophageal Reflux    Have you seen any other physician or provider since your last visit? Yes - Records Obtained  Have you had any other diagnostic tests since your last visit? Yes - Records Obtained  Have you been seen in the emergency room and/or had an admission to a hospital since we last saw you? No     ASSESSMENT/PLAN  1. Anxiety  2. Gastroesophageal reflux disease without esophagitis  -     famotidine (PEPCID) 20 MG tablet; Take 1 tablet by mouth 2 times daily, Disp-60 tablet, R-3OTC  3. Mitral valve prolapse       No labs needed   Recommend OTC Pepcid to replace PPI   Take Flonase for ear and Allegra daily     Contract current     Return in about 6 months (around 12/25/2024) for AWV to be scheduled with next OV - 2 appts.      Patient Care Team:  Kristine Ferguson APRN - CNP as PCP - General (Nurse Practitioner Family)  Kristine Ferguson APRN - CNP as PCP - Empaneled Provider    SUBJECTIVE/OBJECTIVE  History of Present illness / Visit Summary   Patient presents today for follow up   Reviewed health maintenance and any recent labs/imaging    Since last OV has seen uro/gyn   Discussed potential for cystoscopy?   Bladder sxs relieved with Azo     Rarely using Klonopin   Doing well with Buspar BID       6/12/2024 uro/gyn-   Vulvar itching  Burning with urination  -     POCT Urinalysis No Micro (Auto)  Frequent urination  Incomplete emptying of bladder  -     US POST VOID RESIDUAL  Abnormal skin of vulva  -     clobetasol (TEMOVATE) 0.05 % cream; Apply twice daily externally to the affected area. 30days total., Disp-30 g, R-0,

## 2024-07-17 DIAGNOSIS — L98.9 ABNORMAL SKIN OF VULVA: ICD-10-CM

## 2024-07-17 RX ORDER — CLOBETASOL PROPIONATE 0.5 MG/G
CREAM TOPICAL
Qty: 30 G | Refills: 0 | Status: SHIPPED | OUTPATIENT
Start: 2024-07-17

## 2024-07-29 NOTE — PROGRESS NOTES
[facility], [facility] UROGYNECOLOGY AND PELVIC REHABILITATION   [address] [place] [address]  Dept: [phone]  Date: 7/30/2024  Patient Name: Griselda Figueroa    VISIT - FOLLOW UP VISIT     CC: had concerns including Follow-up (Azo is working well for the bladder , and the with increase with the VET has noticed improvement. Pt did not start the imvexxy d/t cost ).    Chaperone present: None Required    HPI: Patient last seen as a new patient 6/2024; was recommended to use clobetasol ointment for abnormal tissue. She is now currently using this 1-2x per day. She felt that the estradiol cream (previously) caused more irritation; we discuss that going to [facility] may prove benefit if there is something irritating in the cream. She tried to use the Imvexxy but was too expensive. However, since using the clobetasol ointment she has been able to go back to using the VET and is tolerating well. She is only using two creams, clobetasol ointment and VET on vagina/vulva. She was concerned that she has IC, as she was previously told this. However, now that she is feeling better, she does not feel that she has this, and feels her bladder is improved. She is only getting up once per night, she is not bothered by urgency/ frequency during the day; no urinary leakage. She has had a previous anterior repair in 2006. She denies any vaginal bleeding or pelvic pain. She feels that she is improving.     Overall state of well-being, dietary and nutritional habits, exercise routines of at least 30 minutes 3 times a week, bowel and bladder function, smoking history, HRT history, sexual activity and partner/s, dyspareunia, and immunizations all revisited if necessary by chart review or direct questioning.    Topics of Prolapse, Pain, Pressure were revisited including type, period of onset, level of severity, quality and quantity, associations, trends,

## 2024-07-30 ENCOUNTER — OFFICE VISIT (OUTPATIENT)
Age: 69
End: 2024-07-30
Payer: MEDICARE

## 2024-07-30 VITALS — DIASTOLIC BLOOD PRESSURE: 76 MMHG | SYSTOLIC BLOOD PRESSURE: 120 MMHG

## 2024-07-30 DIAGNOSIS — R35.0 URINARY FREQUENCY: ICD-10-CM

## 2024-07-30 DIAGNOSIS — R39.15 URGENCY OF URINATION: Primary | ICD-10-CM

## 2024-07-30 DIAGNOSIS — L98.9 ABNORMAL SKIN OF VULVA: ICD-10-CM

## 2024-07-30 DIAGNOSIS — L29.2 VULVAR ITCHING: ICD-10-CM

## 2024-07-30 PROCEDURE — 1036F TOBACCO NON-USER: CPT

## 2024-07-30 PROCEDURE — 3017F COLORECTAL CA SCREEN DOC REV: CPT

## 2024-07-30 PROCEDURE — G8427 DOCREV CUR MEDS BY ELIG CLIN: HCPCS

## 2024-07-30 PROCEDURE — 1123F ACP DISCUSS/DSCN MKR DOCD: CPT

## 2024-07-30 PROCEDURE — 1090F PRES/ABSN URINE INCON ASSESS: CPT

## 2024-07-30 PROCEDURE — G8400 PT W/DXA NO RESULTS DOC: HCPCS

## 2024-07-30 PROCEDURE — G8417 CALC BMI ABV UP PARAM F/U: HCPCS

## 2024-07-30 PROCEDURE — 99213 OFFICE O/P EST LOW 20 MIN: CPT

## 2024-08-27 ENCOUNTER — PATIENT MESSAGE (OUTPATIENT)
Dept: FAMILY MEDICINE CLINIC | Age: 69
End: 2024-08-27

## 2024-08-27 DIAGNOSIS — M54.50 ACUTE RIGHT-SIDED LOW BACK PAIN WITHOUT SCIATICA: ICD-10-CM

## 2024-08-27 DIAGNOSIS — M62.838 MUSCLE SPASM: Primary | ICD-10-CM

## 2024-08-28 NOTE — TELEPHONE ENCOUNTER
Pharmacy is requesting medication refill.  Please approve or deny this request.    Rx requested:  Requested Prescriptions     Pending Prescriptions Disp Refills    traMADol (ULTRAM) 50 MG tablet [Pharmacy Med Name: tramadol 50 mg tablet] 60 tablet 1     Sig: TAKE 1 TABLET BY MOUTH TWICE DAILY AS NEEDED FOR PAIN for up to 60 days         Last Office Visit:   2/26/2020      Next Visit Date:  Future Appointments   Date Time Provider Luc Fernández   8/26/2020  1:00 PM Kirk Morales MD [address] [place] [address] 7 Please advise- patient does see urogynecology, should she reach out to them?

## 2024-09-06 ENCOUNTER — OFFICE VISIT (OUTPATIENT)
Dept: FAMILY MEDICINE CLINIC | Age: 69
End: 2024-09-06
Payer: MEDICARE

## 2024-09-06 VITALS
WEIGHT: 168.6 LBS | SYSTOLIC BLOOD PRESSURE: 102 MMHG | TEMPERATURE: 97.3 F | OXYGEN SATURATION: 98 % | DIASTOLIC BLOOD PRESSURE: 72 MMHG | BODY MASS INDEX: 26.41 KG/M2 | HEART RATE: 78 BPM | RESPIRATION RATE: 16 BRPM

## 2024-09-06 DIAGNOSIS — G89.29 CHRONIC LEFT SHOULDER PAIN: Primary | ICD-10-CM

## 2024-09-06 DIAGNOSIS — M25.561 CHRONIC PAIN OF RIGHT KNEE: ICD-10-CM

## 2024-09-06 DIAGNOSIS — M25.512 CHRONIC LEFT SHOULDER PAIN: Primary | ICD-10-CM

## 2024-09-06 DIAGNOSIS — G89.29 CHRONIC PAIN OF RIGHT KNEE: ICD-10-CM

## 2024-09-06 PROCEDURE — G8427 DOCREV CUR MEDS BY ELIG CLIN: HCPCS | Performed by: NURSE PRACTITIONER

## 2024-09-06 PROCEDURE — G8417 CALC BMI ABV UP PARAM F/U: HCPCS | Performed by: NURSE PRACTITIONER

## 2024-09-06 PROCEDURE — 1036F TOBACCO NON-USER: CPT | Performed by: NURSE PRACTITIONER

## 2024-09-06 PROCEDURE — 3017F COLORECTAL CA SCREEN DOC REV: CPT | Performed by: NURSE PRACTITIONER

## 2024-09-06 PROCEDURE — G8400 PT W/DXA NO RESULTS DOC: HCPCS | Performed by: NURSE PRACTITIONER

## 2024-09-06 PROCEDURE — 1090F PRES/ABSN URINE INCON ASSESS: CPT | Performed by: NURSE PRACTITIONER

## 2024-09-06 PROCEDURE — 1123F ACP DISCUSS/DSCN MKR DOCD: CPT | Performed by: NURSE PRACTITIONER

## 2024-09-06 PROCEDURE — 99212 OFFICE O/P EST SF 10 MIN: CPT | Performed by: NURSE PRACTITIONER

## 2024-09-09 ENCOUNTER — HOSPITAL ENCOUNTER (OUTPATIENT)
Age: 69
Setting detail: THERAPIES SERIES
Discharge: HOME | End: 2024-09-09
Payer: MEDICARE

## 2024-09-09 PROCEDURE — 97110 THERAPEUTIC EXERCISES: CPT

## 2024-09-09 PROCEDURE — 97161 PT EVAL LOW COMPLEX 20 MIN: CPT

## 2024-09-11 ENCOUNTER — APPOINTMENT (OUTPATIENT)
Age: 69
End: 2024-09-11
Payer: MEDICARE

## 2024-09-13 ENCOUNTER — HOSPITAL ENCOUNTER (OUTPATIENT)
Age: 69
Setting detail: THERAPIES SERIES
Discharge: HOME | End: 2024-09-13
Payer: MEDICARE

## 2024-09-16 ENCOUNTER — APPOINTMENT (OUTPATIENT)
Age: 69
End: 2024-09-16
Payer: MEDICARE

## 2024-09-18 ENCOUNTER — APPOINTMENT (OUTPATIENT)
Age: 69
End: 2024-09-18
Payer: MEDICARE

## 2024-09-18 DIAGNOSIS — F41.9 ANXIETY: ICD-10-CM

## 2024-09-20 RX ORDER — CLONAZEPAM 0.5 MG/1
0.5 TABLET ORAL NIGHTLY PRN
Qty: 30 TABLET | Refills: 0 | Status: SHIPPED | OUTPATIENT
Start: 2024-09-20 | End: 2024-10-20

## 2024-09-25 ENCOUNTER — HOSPITAL ENCOUNTER (OUTPATIENT)
Age: 69
Setting detail: THERAPIES SERIES
Discharge: HOME | End: 2024-09-25
Payer: MEDICARE

## 2024-09-25 PROCEDURE — 97140 MANUAL THERAPY 1/> REGIONS: CPT

## 2024-09-25 PROCEDURE — G0283 ELEC STIM OTHER THAN WOUND: HCPCS

## 2024-09-25 PROCEDURE — 97110 THERAPEUTIC EXERCISES: CPT

## 2024-09-26 ASSESSMENT — ENCOUNTER SYMPTOMS: BACK PAIN: 1

## 2024-09-30 ENCOUNTER — HOSPITAL ENCOUNTER (OUTPATIENT)
Age: 69
Setting detail: THERAPIES SERIES
Discharge: HOME | End: 2024-09-30
Payer: MEDICARE

## 2024-09-30 PROCEDURE — 97110 THERAPEUTIC EXERCISES: CPT

## 2024-09-30 NOTE — FLOWSHEET NOTE
[x] [facility]   Outpatient Rehabilitation & Therapy  [address]  P: [phone]  F: [phone]    Physical Therapy Daily Treatment Note      Date:  2024  Patient Name:  Griselda Figueroa    :  1955  MRN: [id number]  Physician: MARIA DE JESUS Curtis-MICHAEL                               Insurance: [facility] Medicare Advantage - PRIOR AUTH AFTER EVAL THRU [facility]  Medical Diagnosis:   M62.838 (ICD-10-CM) - Muscle spasm   M54.50 (ICD-10-CM) - Acute right-sided low back pain without sciatica      M25.512, G89.29 (ICD-10-CM) - Chronic left shoulder pain   M25.561, G89.29 (ICD-10-CM) - Chronic pain of right kne                              Rehab Codes: M54.50, M25.561, M25.512,   Onset Date: 24               Next 's appt.: TBD  Visit# / total visits: 3/20  Cancels/No Shows: 1/0    Subjective:  24   Pain:  [] Yes  [x] No Location: R flank Pain Rating: (0-10 scale) 0/10  Pain altered Tx:  [x] No  [] Yes  Action:    Comments: pt states she is feeling better, less frequent sharp pain; she does get pain when twisting to get in and out of car, when twisting or plopping into a chair    Objective:  Modalities: Empi TENS, 2 pads mid and lower thoracic paraspinals R w/ hot pack, hooklying x 10 mins  Precautions: None  Exercises:  Exercise Reps/ Time Weight/ Level Comments   Standing lumbar ext at table 10 x        Wall gastroc stretch R 30\" x 3        Muscle energy for ant rib 11 R        Muscle energy for FRS L T10 2 rounds     Seated thoracic rot 10 x      Seated thoracic rot R 10 x            TB low shoulder ext 20 x  BLUE    TB mid rows 20 x  BLUE    TB shoulder ER  10 x 2  LIME     Horizontal abd shoulders  10 x 2 LIME     Std glut sets  10 x 5\"       Tandem glut sets         Seated trunk flex                             Other:           Specific Instructions for next treatment: assess symptoms response to HEP and adjust accordingly         Assessment: [x] Progressing toward

## 2024-10-02 ENCOUNTER — HOSPITAL ENCOUNTER (OUTPATIENT)
Age: 69
Setting detail: THERAPIES SERIES
Discharge: HOME | End: 2024-10-02
Payer: MEDICARE

## 2024-10-02 PROCEDURE — 97110 THERAPEUTIC EXERCISES: CPT

## 2024-10-02 NOTE — FLOWSHEET NOTE
10/02/24  pt continues to note subjective improvement in pain; she is going to take 2 weeks off PT for travel and we plan to see her 10/16/24 for f/u    [] No change.     [] Other:    [x] Patient would continue to benefit from skilled physical therapy services in order to: improve pain and transfers    GOALS:     STG: (to be met in 10  treatments)        1. Pt independent w/ proper performance of HEP in order to allow her to control her pain when she is outside the clinic        2. Pt to note greater ease w/ getting out of car on 's side        3. Pt to note a decrease in frequency of R low back pain     LTG: (to be met in 20 treatments)  Improve ANDIE score from 14% impaired to 0% impaired  Pt to deny R knee pain w/ car transfers  Pt to deny L shoulder girdle and chest pain and LBP to allow pt to sit, stand, and walking w/o pain limiting hr        Patient goals: pain relief and learn strengthening exercises    Pt. Education:  [x] Yes  [] No  [x] Reviewed Prior HEP/Ed  Method of Education: [x] Verbal  [x] Demo  [] Written  Comprehension of Education:  [x] Verbalizes understanding.  [x] Demonstrates understanding.  [] Needs review.  [] Demonstrates/verbalizes HEP/Ed previously given.     Plan: [x] Continue per plan of care.   [] Other:      Treatment Charges: Mins Units   []  Modalities UES (TENS w/ HP)     [x]  Ther Exercise 30 2   []  Manual Therapy     []  Ther Activities     []  Aquatics     []  Neuromuscular     [] Vasocompression     [] Gait Training     [] Dry needling        [] 1 or 2 muscles        [] 3 or more muscles     []  Other     Total BILLABLE  time 30 2     Time In:3:00            Time Out: 3:40    Electronically signed by:  Barak Duran, PT

## 2024-10-03 ENCOUNTER — TELEPHONE (OUTPATIENT)
Dept: FAMILY MEDICINE CLINIC | Age: 69
End: 2024-10-03

## 2024-10-03 DIAGNOSIS — R91.1 RIGHT LOWER LOBE PULMONARY NODULE: ICD-10-CM

## 2024-10-03 DIAGNOSIS — R91.1 LEFT LOWER LOBE PULMONARY NODULE: Primary | ICD-10-CM

## 2024-10-03 NOTE — TELEPHONE ENCOUNTER
Patient calling in stating she needs new pulmonary referral for [facility].  Other specialist is not in her insurance plan.    Please advise and route to clinical staff.

## 2024-10-09 ENCOUNTER — OFFICE VISIT (OUTPATIENT)
Dept: PULMONOLOGY | Age: 69
End: 2024-10-09
Payer: MEDICARE

## 2024-10-09 VITALS
DIASTOLIC BLOOD PRESSURE: 78 MMHG | SYSTOLIC BLOOD PRESSURE: 136 MMHG | OXYGEN SATURATION: 98 % | RESPIRATION RATE: 13 BRPM | BODY MASS INDEX: 25.11 KG/M2 | HEART RATE: 78 BPM | HEIGHT: 67 IN | TEMPERATURE: 97.6 F | WEIGHT: 160 LBS

## 2024-10-09 DIAGNOSIS — J45.20 MILD INTERMITTENT ASTHMA WITHOUT COMPLICATION: ICD-10-CM

## 2024-10-09 DIAGNOSIS — D72.10 EOSINOPHILIA, UNSPECIFIED TYPE: ICD-10-CM

## 2024-10-09 DIAGNOSIS — F41.9 ANXIETY: ICD-10-CM

## 2024-10-09 DIAGNOSIS — R91.8 MULTIPLE PULMONARY NODULES: Primary | ICD-10-CM

## 2024-10-09 PROCEDURE — 1090F PRES/ABSN URINE INCON ASSESS: CPT | Performed by: INTERNAL MEDICINE

## 2024-10-09 PROCEDURE — G8484 FLU IMMUNIZE NO ADMIN: HCPCS | Performed by: INTERNAL MEDICINE

## 2024-10-09 PROCEDURE — 3017F COLORECTAL CA SCREEN DOC REV: CPT | Performed by: INTERNAL MEDICINE

## 2024-10-09 PROCEDURE — G8400 PT W/DXA NO RESULTS DOC: HCPCS | Performed by: INTERNAL MEDICINE

## 2024-10-09 PROCEDURE — 99204 OFFICE O/P NEW MOD 45 MIN: CPT | Performed by: INTERNAL MEDICINE

## 2024-10-09 PROCEDURE — G8417 CALC BMI ABV UP PARAM F/U: HCPCS | Performed by: INTERNAL MEDICINE

## 2024-10-09 PROCEDURE — G8427 DOCREV CUR MEDS BY ELIG CLIN: HCPCS | Performed by: INTERNAL MEDICINE

## 2024-10-09 PROCEDURE — 1036F TOBACCO NON-USER: CPT | Performed by: INTERNAL MEDICINE

## 2024-10-09 PROCEDURE — 1123F ACP DISCUSS/DSCN MKR DOCD: CPT | Performed by: INTERNAL MEDICINE

## 2024-10-09 PROCEDURE — 1159F MED LIST DOCD IN RCRD: CPT | Performed by: INTERNAL MEDICINE

## 2024-10-09 NOTE — PROGRESS NOTES
care of your patient.  Please call us if you have any questions or concerns.    Nacho Rosen MD  Pulmonary and Critical Care Medicine     I, Shamika Yepez, am scribing for and in the presence of Nacho Rosen MD. 10/9/24/12:06 PM EDT           This note is created with the assistance of a speech recognition program.  While intent was to generate a document that actually reflects the content of the visit, the document can still have some errors including those of syntax and sound-alike substitutions which may escape proof reading.  It such instances, actual meaning can be extrapolated by contextual diversion.

## 2024-10-16 ENCOUNTER — HOSPITAL ENCOUNTER (OUTPATIENT)
Age: 69
Setting detail: THERAPIES SERIES
Discharge: HOME | End: 2024-10-16
Payer: MEDICARE

## 2024-10-16 NOTE — FLOWSHEET NOTE
[facility] Outpatient Physical Therapy              [address], [place] [address]              Phone: [phone]              Fax: [phone]    Physical Therapy Cancel/No Show note    Date: 10/16/2024  Patient: Griselda Figueroa  : 1955  MRN: [id number]      Cancels/No Shows to date:     For today's appointment patient:    [x]  Cancelled    [] Rescheduled appointment    [] No-show     Reason given by patient:    []  Patient ill    []  Conflicting appointment    [] No transportation      [] Conflict with work    [] No reason given    [] Weather related    [] COVID-19    [x] Other:   pt states she is feeling well and is going to continue w/ her HEP; if she feels she needs more PT she will call us to reschedule; if we do not hear from pt in 2 weeks we will DC her chart at that time   Comments:        [] Next appointment was confirmed    Electronically signed by: Barak Duran PT

## 2024-11-25 NOTE — TELEPHONE ENCOUNTER
Griselda Figueroa is requesting a refill on the following medication(s):  Requested Prescriptions     Pending Prescriptions Disp Refills    diclofenac sodium (VOLTAREN) 1 % GEL 50 g 0     Sig: Apply 2 g topically 4 times daily     Refused Prescriptions Disp Refills    diclofenac sodium (VOLTAREN) 1 % GEL 50 g 0     Sig: Apply 2 g topically 4 times daily     Refused By: ASIA CROOK     Reason for Refusal: Patient needs an appointment       Last Visit Date (If Applicable):  11/28/2023    Next Visit Date:    Visit date not found

## 2024-12-19 PROBLEM — R91.8 MULTIPLE PULMONARY NODULES: Status: ACTIVE | Noted: 2024-12-19

## 2024-12-19 PROBLEM — D72.10 EOSINOPHILIA: Status: ACTIVE | Noted: 2024-12-19

## 2024-12-19 SDOH — HEALTH STABILITY: PHYSICAL HEALTH: ON AVERAGE, HOW MANY MINUTES DO YOU ENGAGE IN EXERCISE AT THIS LEVEL?: 0 MIN

## 2024-12-20 ENCOUNTER — OFFICE VISIT (OUTPATIENT)
Age: 69
End: 2024-12-20
Payer: MEDICARE

## 2024-12-20 VITALS — BODY MASS INDEX: 25.11 KG/M2 | WEIGHT: 160 LBS | HEIGHT: 67 IN

## 2024-12-20 DIAGNOSIS — M25.561 RIGHT KNEE PAIN, UNSPECIFIED CHRONICITY: Primary | ICD-10-CM

## 2024-12-20 PROCEDURE — 1036F TOBACCO NON-USER: CPT | Performed by: NURSE PRACTITIONER

## 2024-12-20 PROCEDURE — G8400 PT W/DXA NO RESULTS DOC: HCPCS | Performed by: NURSE PRACTITIONER

## 2024-12-20 PROCEDURE — G8484 FLU IMMUNIZE NO ADMIN: HCPCS | Performed by: NURSE PRACTITIONER

## 2024-12-20 PROCEDURE — 99203 OFFICE O/P NEW LOW 30 MIN: CPT | Performed by: NURSE PRACTITIONER

## 2024-12-20 PROCEDURE — 1090F PRES/ABSN URINE INCON ASSESS: CPT | Performed by: NURSE PRACTITIONER

## 2024-12-20 PROCEDURE — 1123F ACP DISCUSS/DSCN MKR DOCD: CPT | Performed by: NURSE PRACTITIONER

## 2024-12-20 PROCEDURE — G8417 CALC BMI ABV UP PARAM F/U: HCPCS | Performed by: NURSE PRACTITIONER

## 2024-12-20 PROCEDURE — G8427 DOCREV CUR MEDS BY ELIG CLIN: HCPCS | Performed by: NURSE PRACTITIONER

## 2024-12-20 PROCEDURE — 1159F MED LIST DOCD IN RCRD: CPT | Performed by: NURSE PRACTITIONER

## 2024-12-20 PROCEDURE — 3017F COLORECTAL CA SCREEN DOC REV: CPT | Performed by: NURSE PRACTITIONER

## 2024-12-20 NOTE — PROGRESS NOTES
Irritable bowel syndrome     Mitral valve prolapse     Neuropathy 2022    Pin prick feeling in feet & toes     Past Surgical History:   Procedure Laterality Date    BLADDER REPAIR  2008     SECTION N/A     x2    CHOLECYSTECTOMY       Family History   Problem Relation Age of Onset    Heart Disease Son     Breast Cancer Neg Hx     Ovarian Cancer Neg Hx     Uterine Cancer Neg Hx     Endometrial Cancer Neg Hx     Cervical Cancer Neg Hx     Vaginal Cancer Neg Hx     Colon Cancer Neg Hx           Electronically signed by MARIA DE JESUS Mcnamara CNP on 2024 at 1:43 PM     Please note that this chart was generated using voice recognition Dragon dictation software.  Although every effort was made to ensure the accuracy of this automated transcription, some errors in transcription may have occurred.

## 2025-01-09 ENCOUNTER — HOSPITAL ENCOUNTER (OUTPATIENT)
Dept: CT IMAGING | Age: 70
Discharge: HOME | End: 2025-01-11
Attending: INTERNAL MEDICINE
Payer: MEDICARE

## 2025-01-09 DIAGNOSIS — R91.8 MULTIPLE PULMONARY NODULES: ICD-10-CM

## 2025-01-09 PROCEDURE — 71250 CT THORAX DX C-: CPT

## 2025-01-14 NOTE — PATIENT INSTRUCTIONS
[facility] [phone], to schedule any tests/ imaging that has been ordered for you.     Please call the office to schedule a f/u visit in July 2026.

## 2025-01-15 ENCOUNTER — OFFICE VISIT (OUTPATIENT)
Dept: PULMONOLOGY | Age: 70
End: 2025-01-15
Payer: MEDICARE

## 2025-01-15 VITALS
HEART RATE: 87 BPM | RESPIRATION RATE: 16 BRPM | HEIGHT: 67 IN | SYSTOLIC BLOOD PRESSURE: 117 MMHG | BODY MASS INDEX: 25.9 KG/M2 | OXYGEN SATURATION: 99 % | DIASTOLIC BLOOD PRESSURE: 79 MMHG | WEIGHT: 165 LBS

## 2025-01-15 DIAGNOSIS — D72.10 EOSINOPHILIA, UNSPECIFIED TYPE: ICD-10-CM

## 2025-01-15 DIAGNOSIS — J45.20 MILD INTERMITTENT ASTHMA WITHOUT COMPLICATION: ICD-10-CM

## 2025-01-15 DIAGNOSIS — R91.8 MULTIPLE PULMONARY NODULES: Primary | ICD-10-CM

## 2025-01-15 PROCEDURE — 1159F MED LIST DOCD IN RCRD: CPT | Performed by: INTERNAL MEDICINE

## 2025-01-15 PROCEDURE — 1123F ACP DISCUSS/DSCN MKR DOCD: CPT | Performed by: INTERNAL MEDICINE

## 2025-01-15 PROCEDURE — 99214 OFFICE O/P EST MOD 30 MIN: CPT | Performed by: INTERNAL MEDICINE

## 2025-01-15 PROCEDURE — 1036F TOBACCO NON-USER: CPT | Performed by: INTERNAL MEDICINE

## 2025-01-15 PROCEDURE — G8417 CALC BMI ABV UP PARAM F/U: HCPCS | Performed by: INTERNAL MEDICINE

## 2025-01-15 PROCEDURE — 3017F COLORECTAL CA SCREEN DOC REV: CPT | Performed by: INTERNAL MEDICINE

## 2025-01-15 PROCEDURE — G8427 DOCREV CUR MEDS BY ELIG CLIN: HCPCS | Performed by: INTERNAL MEDICINE

## 2025-01-15 PROCEDURE — G8400 PT W/DXA NO RESULTS DOC: HCPCS | Performed by: INTERNAL MEDICINE

## 2025-01-15 PROCEDURE — 1090F PRES/ABSN URINE INCON ASSESS: CPT | Performed by: INTERNAL MEDICINE

## 2025-01-15 NOTE — PROGRESS NOTES
[] [x]   [place] [] [x]   PETS [] [x]   PARAKEETS  []  [x]   TUBERCULOSIS [] [x]   HOT TUB [] [x]   DRUGS [] [x]   OTHER [] [x]     PULMONARY CO-MORBIDITIES/RISK FACTORS:     Yes No   NASO-BRONCHIAL/ENVIRONMENTAL ALLERGIES [] [x]   EOSINOPHILIA [x] []   BRONCHIAL ASTHMA [x] []   CHRONIC BRONCHITIS/EMPHYSEMA/COPD [] [x]   CHRONIC SINUSITIS/POSTNASAL DRIP [] [x]   ACID REFLUX/GERD [x] []   ACE/ARB USAGE [] [x]   ASPIRIN USAGE [] [x]   CORONARY ARTERY DISEASE  []  [x]   CONGESTIVE CARDIAC FAILURE [] [x]   ATRIAL FIBRILLATION [] [x]   AMIODARONE USAGE [] [x]   PULMONARY HYPERTENSION [] [x]   VENOUS THROMBOEMBOLISM [] [x]   END STAGE RENAL DISEASE [] [x]   CHRONIC LIVER DISEASE/CIRRHOSIS [] [x]   CONNECTIVE TISSUE DISORDER [] [x]   INTERSTITIAL LUNG DISEASE [] [x]   CHEST WALL RESTRICTION/DIAPHRAGMATIC DISORDER [] [x]   LUNG CANCER HISTORY [] [x]   RADIATION THERAPY HISTORY [] [x]   MORBID OBESITY [] [x]   AMBULATORY OXYGEN USE [] [x]   NOCTURNAL CPAP USE [] [x]   NOCTURNAL BIPAP/NIV USE [] [x]     PAST MEDICAL HISTORY:      Diagnosis Date    Acid reflux     Agoraphobia     Anxiety     social - panic attacks    Asthma     Herpes     Irritable bowel syndrome     Mitral valve prolapse     Neuropathy 2022    Pin prick feeling in feet & toes     PAST SURGICAL HISTORY:      Procedure Laterality Date    BLADDER REPAIR       SECTION N/A     x2    CHOLECYSTECTOMY         CURRENT MEDICATIONS:  Outpatient Medications Prior to Visit   Medication Sig Dispense Refill    diclofenac sodium (VOLTAREN) 1 % GEL Apply 2 g topically 4 times daily 50 g 0    clobetasol (TEMOVATE) 0.05 % cream Apply twice daily externally to the affected area. 30days total. 30 g 0    Pumpkin Seed-Soy Germ (AZO BLADDER CONTROL/GO-LESS PO) Take 1 tablet by mouth daily      famotidine (PEPCID) 20 MG tablet Take 1 tablet by mouth 2 times daily 60 tablet 3    Estradiol (IMVEXXY MAINTENANCE PACK) 4 MCG INST Place 4 mcg vaginally Twice a Week

## 2025-02-04 ENCOUNTER — TELEPHONE (OUTPATIENT)
Age: 70
End: 2025-02-04

## 2025-02-04 DIAGNOSIS — N95.2 VAGINAL ATROPHY: Primary | ICD-10-CM

## 2025-02-04 RX ORDER — ESTRADIOL 0.1 MG/G
1 CREAM VAGINAL
Qty: 42.5 G | Refills: 0 | Status: SHIPPED | OUTPATIENT
Start: 2025-02-04

## 2025-02-04 NOTE — TELEPHONE ENCOUNTER
Spoke with pt and advised rx sent, advised regarding scheduling recheck, pt had messaged already regarding symptoms have cleared and will wait to schedule annual when due

## 2025-02-04 NOTE — TELEPHONE ENCOUNTER
Sent [employer] message to patient per her request. I did ask her to call us to schedule a follow up appointment.

## 2025-02-04 NOTE — TELEPHONE ENCOUNTER
Pt called requesting a refill of estradiol cream, she had gotten it from [facility] in the past and so pharmacy cannot send refill request, can rx for 90-day supply please be sent to [facility]? Pt requests a [employer] message be sent to her once rx is complete

## 2025-02-04 NOTE — TELEPHONE ENCOUNTER
Ok sent rx. Please call patient and schedule follow up to recheck vulvar tissue - you may also let her know that I sent this to [facility].

## 2025-04-12 DIAGNOSIS — N95.2 VAGINAL ATROPHY: ICD-10-CM

## 2025-04-14 RX ORDER — ESTRADIOL 0.1 MG/G
CREAM VAGINAL
Qty: 42.5 G | Refills: 3 | Status: SHIPPED | OUTPATIENT
Start: 2025-04-14

## 2025-04-21 SDOH — HEALTH STABILITY: PHYSICAL HEALTH: ON AVERAGE, HOW MANY DAYS PER WEEK DO YOU ENGAGE IN MODERATE TO STRENUOUS EXERCISE (LIKE A BRISK WALK)?: 0 DAYS

## 2025-04-21 SDOH — HEALTH STABILITY: PHYSICAL HEALTH: ON AVERAGE, HOW MANY MINUTES DO YOU ENGAGE IN EXERCISE AT THIS LEVEL?: 30 MIN

## 2025-04-22 ENCOUNTER — OFFICE VISIT (OUTPATIENT)
Dept: FAMILY MEDICINE CLINIC | Age: 70
End: 2025-04-22
Payer: MEDICARE

## 2025-04-22 VITALS
SYSTOLIC BLOOD PRESSURE: 120 MMHG | TEMPERATURE: 98.3 F | HEIGHT: 67 IN | DIASTOLIC BLOOD PRESSURE: 78 MMHG | OXYGEN SATURATION: 99 % | HEART RATE: 85 BPM | WEIGHT: 174 LBS | BODY MASS INDEX: 27.31 KG/M2

## 2025-04-22 DIAGNOSIS — E55.9 VITAMIN D DEFICIENCY: ICD-10-CM

## 2025-04-22 DIAGNOSIS — K21.9 GASTROESOPHAGEAL REFLUX DISEASE WITHOUT ESOPHAGITIS: ICD-10-CM

## 2025-04-22 DIAGNOSIS — J45.909 UNCOMPLICATED ASTHMA, UNSPECIFIED ASTHMA SEVERITY, UNSPECIFIED WHETHER PERSISTENT: ICD-10-CM

## 2025-04-22 DIAGNOSIS — G47.9 DIFFICULTY SLEEPING: ICD-10-CM

## 2025-04-22 DIAGNOSIS — Z13.1 SCREENING FOR DIABETES MELLITUS: ICD-10-CM

## 2025-04-22 DIAGNOSIS — Z13.21 ENCOUNTER FOR VITAMIN DEFICIENCY SCREENING: ICD-10-CM

## 2025-04-22 DIAGNOSIS — Z13.29 SCREENING FOR THYROID DISORDER: ICD-10-CM

## 2025-04-22 DIAGNOSIS — Z78.0 POST-MENOPAUSAL: ICD-10-CM

## 2025-04-22 DIAGNOSIS — Z00.00 ENCOUNTER FOR MEDICAL EXAMINATION TO ESTABLISH CARE: Primary | ICD-10-CM

## 2025-04-22 DIAGNOSIS — Z13.0 SCREENING FOR DEFICIENCY ANEMIA: ICD-10-CM

## 2025-04-22 DIAGNOSIS — F41.9 ANXIETY: ICD-10-CM

## 2025-04-22 DIAGNOSIS — G62.9 NEUROPATHY: ICD-10-CM

## 2025-04-22 PROBLEM — R39.15 URINARY URGENCY: Status: RESOLVED | Noted: 2025-04-22 | Resolved: 2025-04-22

## 2025-04-22 PROBLEM — R35.0 FREQUENT URINATION: Status: RESOLVED | Noted: 2024-06-06 | Resolved: 2025-04-22

## 2025-04-22 PROBLEM — R30.0 BURNING WITH URINATION: Status: RESOLVED | Noted: 2024-06-06 | Resolved: 2025-04-22

## 2025-04-22 PROBLEM — N76.3 SUBACUTE VULVITIS: Status: RESOLVED | Noted: 2025-04-22 | Resolved: 2025-04-22

## 2025-04-22 PROBLEM — L29.2 VULVAR ITCHING: Status: RESOLVED | Noted: 2024-06-06 | Resolved: 2025-04-22

## 2025-04-22 PROBLEM — R33.9 INCOMPLETE EMPTYING OF BLADDER: Status: RESOLVED | Noted: 2024-06-06 | Resolved: 2025-04-22

## 2025-04-22 PROCEDURE — 1090F PRES/ABSN URINE INCON ASSESS: CPT | Performed by: REGISTERED NURSE

## 2025-04-22 PROCEDURE — 3017F COLORECTAL CA SCREEN DOC REV: CPT | Performed by: REGISTERED NURSE

## 2025-04-22 PROCEDURE — 99214 OFFICE O/P EST MOD 30 MIN: CPT | Performed by: REGISTERED NURSE

## 2025-04-22 PROCEDURE — 1159F MED LIST DOCD IN RCRD: CPT | Performed by: REGISTERED NURSE

## 2025-04-22 PROCEDURE — G8400 PT W/DXA NO RESULTS DOC: HCPCS | Performed by: REGISTERED NURSE

## 2025-04-22 PROCEDURE — 1123F ACP DISCUSS/DSCN MKR DOCD: CPT | Performed by: REGISTERED NURSE

## 2025-04-22 PROCEDURE — 1036F TOBACCO NON-USER: CPT | Performed by: REGISTERED NURSE

## 2025-04-22 PROCEDURE — G8427 DOCREV CUR MEDS BY ELIG CLIN: HCPCS | Performed by: REGISTERED NURSE

## 2025-04-22 PROCEDURE — G8417 CALC BMI ABV UP PARAM F/U: HCPCS | Performed by: REGISTERED NURSE

## 2025-04-22 PROCEDURE — 1160F RVW MEDS BY RX/DR IN RCRD: CPT | Performed by: REGISTERED NURSE

## 2025-04-22 RX ORDER — TRAZODONE HYDROCHLORIDE 50 MG/1
50 TABLET ORAL NIGHTLY
Qty: 90 TABLET | Refills: 1 | Status: SHIPPED | OUTPATIENT
Start: 2025-04-22

## 2025-04-22 RX ORDER — BUSPIRONE HYDROCHLORIDE 5 MG/1
TABLET ORAL
Qty: 180 TABLET | Refills: 1 | Status: SHIPPED | OUTPATIENT
Start: 2025-04-22

## 2025-04-22 RX ORDER — OMEPRAZOLE 20 MG/1
20 CAPSULE, DELAYED RELEASE ORAL DAILY
Qty: 90 CAPSULE | Refills: 3 | Status: SHIPPED | OUTPATIENT
Start: 2025-04-22

## 2025-04-22 RX ORDER — ASPIRIN 81 MG/1
81 TABLET, CHEWABLE ORAL DAILY
COMMUNITY

## 2025-04-22 RX ORDER — ALBUTEROL SULFATE 90 UG/1
2 INHALANT RESPIRATORY (INHALATION) EVERY 6 HOURS PRN
Qty: 18 G | Refills: 0 | Status: SHIPPED | OUTPATIENT
Start: 2025-04-22

## 2025-04-22 SDOH — ECONOMIC STABILITY: FOOD INSECURITY: WITHIN THE PAST 12 MONTHS, THE FOOD YOU BOUGHT JUST DIDN'T LAST AND YOU DIDN'T HAVE MONEY TO GET MORE.: NEVER TRUE

## 2025-04-22 SDOH — ECONOMIC STABILITY: FOOD INSECURITY: WITHIN THE PAST 12 MONTHS, YOU WORRIED THAT YOUR FOOD WOULD RUN OUT BEFORE YOU GOT MONEY TO BUY MORE.: NEVER TRUE

## 2025-04-22 ASSESSMENT — ENCOUNTER SYMPTOMS
CONSTIPATION: 0
NAUSEA: 0
ABDOMINAL PAIN: 0
VOMITING: 0
COUGH: 0
DIARRHEA: 0
SHORTNESS OF BREATH: 0
SINUS PRESSURE: 0
SORE THROAT: 0

## 2025-04-22 ASSESSMENT — PATIENT HEALTH QUESTIONNAIRE - PHQ9
SUM OF ALL RESPONSES TO PHQ QUESTIONS 1-9: 0
2. FEELING DOWN, DEPRESSED OR HOPELESS: NOT AT ALL
SUM OF ALL RESPONSES TO PHQ QUESTIONS 1-9: 0
SUM OF ALL RESPONSES TO PHQ QUESTIONS 1-9: 0
1. LITTLE INTEREST OR PLEASURE IN DOING THINGS: NOT AT ALL
SUM OF ALL RESPONSES TO PHQ QUESTIONS 1-9: 0

## 2025-04-22 ASSESSMENT — ANXIETY QUESTIONNAIRES
4. TROUBLE RELAXING: NOT AT ALL
2. NOT BEING ABLE TO STOP OR CONTROL WORRYING: NOT AT ALL
3. WORRYING TOO MUCH ABOUT DIFFERENT THINGS: NOT AT ALL
1. FEELING NERVOUS, ANXIOUS, OR ON EDGE: SEVERAL DAYS
IF YOU CHECKED OFF ANY PROBLEMS ON THIS QUESTIONNAIRE, HOW DIFFICULT HAVE THESE PROBLEMS MADE IT FOR YOU TO DO YOUR WORK, TAKE CARE OF THINGS AT HOME, OR GET ALONG WITH OTHER PEOPLE: NOT DIFFICULT AT ALL
GAD7 TOTAL SCORE: 1
6. BECOMING EASILY ANNOYED OR IRRITABLE: NOT AT ALL
7. FEELING AFRAID AS IF SOMETHING AWFUL MIGHT HAPPEN: NOT AT ALL
5. BEING SO RESTLESS THAT IT IS HARD TO SIT STILL: NOT AT ALL

## 2025-04-22 NOTE — PROGRESS NOTES
[facility]  [facility] [address] [facility] [address]  Dept: [phone]    CHIEF COMPLAINT:      Chief Complaint   Patient presents with    Established New Doctor     Been having neuropathy pain, would like to switch medication from KLONOPIN opiod to non opiod medication          ASSESSMENT & PLAN     Assessment & Plan  1. Anxiety.  - Continues current regimen of BuSpar.  - Reports BuSpar is effective.  - Discussed alternative sleep aids; prescription for trazodone issued with refills as needed.  - Klonopin prescribed by previous provider; patient prefers to discontinue once current supply is depleted.    2. Peripheral neuropathy.  - Experiences intermittent pain in elbow and other joints.  - Previously used CBD oil sublingually for sleep and CBD gummies for neuropathy; discontinued due to gastrointestinal side effects.  - Currently managing pain with ibuprofen.    3. Right knee pain.  - Sustained right knee injury last winter; managed with brace and cortisone injection.  - Reports muscle soreness and burning sensations upon exertion; gained weight due to reduced activity.  - Taking collagen supplements and vitamin D; discontinued due to leg pain.  - Uses smaller brace for support during increased activity.    4. Health maintenance.  - Due for annual laboratory tests in 04/2025.  - DEXA scan ordered to screen for osteoporosis.  - Laboratory tests ordered, including B12 level check; advised to fast for 8 hours prior to test, consuming only water and black coffee.  - Pulmonary function test deferred to pulmonologist.    Follow-up  - Scheduled for follow-up in 6 months.    1. Encounter for medical examination to establish care  2. Anxiety  -     busPIRone (BUSPAR) 5 MG tablet; take 1 to 2 tablets by mouth three times a day, Disp-180 tablet, R-1Normal  3. Difficulty sleeping  -     traZODone (DESYREL) 50 MG tablet; Take 1 tablet by mouth nightly, Disp-90 tablet,  Elidel Counseling: Patient may experience a mild burning sensation during topical application. Elidel is not approved in children less than 2 years of age. There have been case reports of hematologic and skin malignancies in patients using topical calcineurin inhibitors although causality is questionable.

## 2025-04-23 ENCOUNTER — HOSPITAL ENCOUNTER (OUTPATIENT)
Age: 70
Setting detail: SPECIMEN
Discharge: HOME | End: 2025-04-23

## 2025-04-23 DIAGNOSIS — Z13.1 SCREENING FOR DIABETES MELLITUS: ICD-10-CM

## 2025-04-23 DIAGNOSIS — G62.9 NEUROPATHY: ICD-10-CM

## 2025-04-23 DIAGNOSIS — Z13.0 SCREENING FOR DEFICIENCY ANEMIA: ICD-10-CM

## 2025-04-23 DIAGNOSIS — E55.9 VITAMIN D DEFICIENCY: ICD-10-CM

## 2025-04-23 DIAGNOSIS — Z13.29 SCREENING FOR THYROID DISORDER: ICD-10-CM

## 2025-04-23 LAB
25(OH)D3 SERPL-MCNC: 32.1 NG/ML (ref 30–100)
ALBUMIN SERPL-MCNC: 4.4 G/DL (ref 3.5–5.2)
ALBUMIN/GLOB SERPL: 1.5 {RATIO} (ref 1–2.5)
ALP SERPL-CCNC: 77 U/L (ref 35–104)
ALT SERPL-CCNC: 20 U/L (ref 10–35)
ANION GAP SERPL CALCULATED.3IONS-SCNC: 9 MMOL/L (ref 9–16)
AST SERPL-CCNC: 21 U/L (ref 10–35)
BASOPHILS # BLD: 0.05 K/UL (ref 0–0.2)
BASOPHILS NFR BLD: 1 % (ref 0–2)
BILIRUB SERPL-MCNC: 0.5 MG/DL (ref 0–1.2)
BUN SERPL-MCNC: 19 MG/DL (ref 8–23)
CALCIUM SERPL-MCNC: 9.3 MG/DL (ref 8.6–10.4)
CHLORIDE SERPL-SCNC: 104 MMOL/L (ref 98–107)
CO2 SERPL-SCNC: 27 MMOL/L (ref 20–31)
CREAT SERPL-MCNC: 0.9 MG/DL (ref 0.6–0.9)
CRP SERPL HS-MCNC: <3 MG/L (ref 0–5)
EOSINOPHIL # BLD: 0.22 K/UL (ref 0–0.44)
EOSINOPHILS RELATIVE PERCENT: 3 % (ref 1–4)
ERYTHROCYTE [DISTWIDTH] IN BLOOD BY AUTOMATED COUNT: 12.1 % (ref 11.8–14.4)
ERYTHROCYTE [SEDIMENTATION RATE] IN BLOOD BY PHOTOMETRIC METHOD: 4 MM/HR (ref 0–30)
EST. AVERAGE GLUCOSE BLD GHB EST-MCNC: 82 MG/DL
GFR, ESTIMATED: 69 ML/MIN/1.73M2
GLUCOSE P FAST SERPL-MCNC: 96 MG/DL (ref 74–99)
HBA1C MFR BLD: 4.5 % (ref 4–6)
HCT VFR BLD AUTO: 45.1 % (ref 36.3–47.1)
HGB BLD-MCNC: 14.6 G/DL (ref 11.9–15.1)
IMM GRANULOCYTES # BLD AUTO: <0.03 K/UL (ref 0–0.3)
IMM GRANULOCYTES NFR BLD: 0 %
LYMPHOCYTES NFR BLD: 2.47 K/UL (ref 1.1–3.7)
LYMPHOCYTES RELATIVE PERCENT: 38 % (ref 24–43)
MCH RBC QN AUTO: 30.4 PG (ref 25.2–33.5)
MCHC RBC AUTO-ENTMCNC: 32.4 G/DL (ref 28.4–34.8)
MCV RBC AUTO: 93.8 FL (ref 82.6–102.9)
MONOCYTES NFR BLD: 0.39 K/UL (ref 0.1–1.2)
MONOCYTES NFR BLD: 6 % (ref 3–12)
NEUTROPHILS NFR BLD: 52 % (ref 36–65)
NEUTS SEG NFR BLD: 3.34 K/UL (ref 1.5–8.1)
NRBC BLD-RTO: 0 PER 100 WBC
PLATELET # BLD AUTO: 261 K/UL (ref 138–453)
PMV BLD AUTO: 11.6 FL (ref 8.1–13.5)
POTASSIUM SERPL-SCNC: 5.2 MMOL/L (ref 3.7–5.3)
PROT SERPL-MCNC: 7.3 G/DL (ref 6.6–8.7)
RBC # BLD AUTO: 4.81 M/UL (ref 3.95–5.11)
SODIUM SERPL-SCNC: 140 MMOL/L (ref 136–145)
T4 FREE SERPL-MCNC: 1.1 NG/DL (ref 0.9–1.7)
TSH SERPL DL<=0.05 MIU/L-ACNC: 1.55 UIU/ML (ref 0.27–4.2)
VIT B12 SERPL-MCNC: 721 PG/ML (ref 232–1245)
WBC OTHER # BLD: 6.5 K/UL (ref 3.5–11.3)

## 2025-04-24 ENCOUNTER — RESULTS FOLLOW-UP (OUTPATIENT)
Dept: FAMILY MEDICINE CLINIC | Age: 70
End: 2025-04-24

## 2025-05-31 DIAGNOSIS — J45.909 UNCOMPLICATED ASTHMA, UNSPECIFIED ASTHMA SEVERITY, UNSPECIFIED WHETHER PERSISTENT: ICD-10-CM

## 2025-06-02 RX ORDER — ALBUTEROL SULFATE 90 UG/1
INHALANT RESPIRATORY (INHALATION)
Qty: 17 G | Refills: 6 | Status: SHIPPED | OUTPATIENT
Start: 2025-06-02

## 2025-06-02 NOTE — TELEPHONE ENCOUNTER
LOV: 4/22/2025    RTO:   Future Appointments   Date Time Provider Department Center   6/18/2025  2:30 PM Adry Beth APRN - CNP [facility]   10/22/2025  1:00 PM Osbaldo Lebron APRN - CNP [place]  [facility]     LRF: 4/22/25          Controlled Substance Monitoring:    Acute and Chronic Pain Monitoring:        No data to display

## 2025-06-04 DIAGNOSIS — H69.91 EUSTACHIAN TUBE DISORDER, RIGHT: ICD-10-CM

## 2025-06-04 RX ORDER — FLUTICASONE PROPIONATE 50 MCG
1 SPRAY, SUSPENSION (ML) NASAL DAILY
Qty: 3 EACH | Refills: 3 | Status: SHIPPED | OUTPATIENT
Start: 2025-06-04 | End: 2026-06-04

## 2025-06-04 NOTE — TELEPHONE ENCOUNTER
LOV  04/22/2025  RTO    10/22/2025  LRF      03/22/2022        Patient was seen at the [facility] for allergies -   would like to know if you could prescribe this for her so she doesn't have to pay out of pocket     Controlled Substance Monitoring:    Acute and Chronic Pain Monitoring:        No data to display

## 2025-06-12 DIAGNOSIS — F41.9 ANXIETY: ICD-10-CM

## 2025-06-12 RX ORDER — BUSPIRONE HYDROCHLORIDE 5 MG/1
TABLET ORAL
Qty: 180 TABLET | Refills: 1 | Status: SHIPPED | OUTPATIENT
Start: 2025-06-12

## 2025-06-12 NOTE — TELEPHONE ENCOUNTER
LOV: 4/22/2025    RTO:   Future Appointments   Date Time Provider Department Center   6/18/2025  2:30 PM Adry Beth APRN - CNP [facility]   10/22/2025  1:00 PM Osbaldo Lebron APRN - CNP [place]     LRF: 4/22/2025          Controlled Substance Monitoring:    Acute and Chronic Pain Monitoring:        No data to display

## 2025-06-18 ENCOUNTER — OFFICE VISIT (OUTPATIENT)
Age: 70
End: 2025-06-18
Payer: MEDICARE

## 2025-06-18 VITALS
DIASTOLIC BLOOD PRESSURE: 53 MMHG | OXYGEN SATURATION: 95 % | BODY MASS INDEX: 27.97 KG/M2 | TEMPERATURE: 97.9 F | HEART RATE: 80 BPM | HEIGHT: 66 IN | SYSTOLIC BLOOD PRESSURE: 112 MMHG | WEIGHT: 174 LBS

## 2025-06-18 DIAGNOSIS — Z12.31 ENCOUNTER FOR SCREENING MAMMOGRAM FOR MALIGNANT NEOPLASM OF BREAST: ICD-10-CM

## 2025-06-18 DIAGNOSIS — Z78.0 MENOPAUSE: ICD-10-CM

## 2025-06-18 DIAGNOSIS — Z12.11 SCREENING FOR COLON CANCER: ICD-10-CM

## 2025-06-18 DIAGNOSIS — N95.2 VAGINAL ATROPHY: ICD-10-CM

## 2025-06-18 DIAGNOSIS — Z01.419 WELL WOMAN EXAM WITH ROUTINE GYNECOLOGICAL EXAM: Primary | ICD-10-CM

## 2025-06-18 DIAGNOSIS — L98.9 ABNORMAL SKIN OF VULVA: ICD-10-CM

## 2025-06-18 PROCEDURE — G0101 CA SCREEN;PELVIC/BREAST EXAM: HCPCS

## 2025-06-18 PROCEDURE — G8427 DOCREV CUR MEDS BY ELIG CLIN: HCPCS

## 2025-06-18 PROCEDURE — G8417 CALC BMI ABV UP PARAM F/U: HCPCS

## 2025-06-18 RX ORDER — CLOBETASOL PROPIONATE 0.5 MG/G
CREAM TOPICAL
Qty: 30 G | Refills: 2 | Status: SHIPPED | OUTPATIENT
Start: 2025-06-18

## 2025-06-18 RX ORDER — ESTRADIOL 0.1 MG/G
1 CREAM VAGINAL
Qty: 42.5 G | Refills: 3 | Status: SHIPPED | OUTPATIENT
Start: 2025-06-19 | End: 2025-06-19

## 2025-06-18 ASSESSMENT — ENCOUNTER SYMPTOMS: GASTROINTESTINAL NEGATIVE: 1

## 2025-06-18 NOTE — PROGRESS NOTES
[facility], [facility] UROGYNECOLOGY AND PELVIC REHABILITATION   [address] [place] [address]  Dept: [phone]   Patient:  Griselda Figueroa   :  1955   Visit Date:  2025     VISIT - ANNUAL WELL EXAM  CC: This is an annual well patient visit.     HPI:   History of Present Illness  The patient presents for an annual exam.    She reports a general sense of well-being, with no specific complaints of pain. She has experienced weight gain and describes a sensation of her stomach pressing against her ribs, akin to a bruise. She is not experiencing symptoms of acid reflux. She plans to discuss this with her primary care physician during her upcoming visit in a few months. She has undergone endoscopy a couple of times. She is not interested in receiving injections for weight loss and prefers to manage her weight through diet and exercise.    She reports no vaginal bleeding, itching, burning, or abnormal discharge. She is not sexually active. She is currently using vaginal estrogen cream twice weekly. She used to apply it three times a week last year and then reduced it to two times a week. She also uses clobetasol intermittently as needed. She requests refills for both medications.    She has a history of herpes and experiences annual outbreaks, which she manages with acyclovir cream. The outbreaks are more frequent in hot weather and present as painful whiteheads. She has not tried oral medication for herpes.    Her sleep pattern varies. She has tried trazodone twice but does not take it regularly as she does not have daily sleep issues. She has also tried melatonin gummies but is unsure of their effectiveness. She has not tried Magnesium.     She has a long-standing diagnosis of IBS, which is well-managed unless she consumes fatty foods. She has not had a colonoscopy in several years and has not completed a Cologuard test that was

## 2025-06-19 DIAGNOSIS — B00.9 HERPES: Primary | ICD-10-CM

## 2025-06-19 DIAGNOSIS — N95.2 VAGINAL ATROPHY: ICD-10-CM

## 2025-06-19 RX ORDER — ESTRADIOL 10 UG/1
10 TABLET, FILM COATED VAGINAL
Qty: 8 TABLET | Refills: 3 | Status: SHIPPED | OUTPATIENT
Start: 2025-06-19

## 2025-06-19 RX ORDER — ACYCLOVIR 50 MG/G
OINTMENT TOPICAL
Qty: 30 G | Refills: 1 | Status: SHIPPED | OUTPATIENT
Start: 2025-06-19 | End: 2025-06-26

## 2025-06-25 ENCOUNTER — TELEMEDICINE (OUTPATIENT)
Dept: FAMILY MEDICINE CLINIC | Age: 70
End: 2025-06-25
Payer: MEDICARE

## 2025-06-25 DIAGNOSIS — R19.7 DIARRHEA OF PRESUMED INFECTIOUS ORIGIN: Primary | ICD-10-CM

## 2025-06-25 PROCEDURE — 99213 OFFICE O/P EST LOW 20 MIN: CPT | Performed by: REGISTERED NURSE

## 2025-06-25 PROCEDURE — 1090F PRES/ABSN URINE INCON ASSESS: CPT | Performed by: REGISTERED NURSE

## 2025-06-25 PROCEDURE — 3017F COLORECTAL CA SCREEN DOC REV: CPT | Performed by: REGISTERED NURSE

## 2025-06-25 PROCEDURE — 1159F MED LIST DOCD IN RCRD: CPT | Performed by: REGISTERED NURSE

## 2025-06-25 PROCEDURE — 1123F ACP DISCUSS/DSCN MKR DOCD: CPT | Performed by: REGISTERED NURSE

## 2025-06-25 PROCEDURE — 1036F TOBACCO NON-USER: CPT | Performed by: REGISTERED NURSE

## 2025-06-25 PROCEDURE — G8417 CALC BMI ABV UP PARAM F/U: HCPCS | Performed by: REGISTERED NURSE

## 2025-06-25 PROCEDURE — G8400 PT W/DXA NO RESULTS DOC: HCPCS | Performed by: REGISTERED NURSE

## 2025-06-25 PROCEDURE — G8427 DOCREV CUR MEDS BY ELIG CLIN: HCPCS | Performed by: REGISTERED NURSE

## 2025-06-25 ASSESSMENT — ENCOUNTER SYMPTOMS
DIARRHEA: 1
ABDOMINAL PAIN: 1

## 2025-06-25 NOTE — PROGRESS NOTES
negative item  -- DELETE ALL ITEMS NOT EXAMINED]    Constitutional: [x] Appears well-developed and well-nourished [x] No apparent distress      [] Abnormal -     Mental status: [x] Alert and awake  [x] Oriented to person/place/time [x] Able to follow commands    [] Abnormal -     Eyes:   EOM    [x]  Normal    [] Abnormal -   Sclera  [x]  Normal    [] Abnormal -          Discharge [x]  None visible   [] Abnormal -     HENT: [x] Normocephalic, atraumatic  [] Abnormal -   [x] Mouth/Throat: Mucous membranes are moist    External Ears [x] Normal  [] Abnormal -    Neck: [x] No visualized mass [] Abnormal -     Pulmonary/Chest: [x] Respiratory effort normal   [x] No visualized signs of difficulty breathing or respiratory distress        [] Abnormal -      Musculoskeletal:   [x] Normal gait with no signs of ataxia         [x] Normal range of motion of neck        [] Abnormal -     Neurological:        [x] No Facial Asymmetry (Cranial nerve 7 motor function) (limited exam due to video visit)          [x] No gaze palsy        [] Abnormal -          Skin:        [x] No significant exanthematous lesions or discoloration noted on facial skin         [] Abnormal -            Psychiatric:       [x] Normal Affect [] Abnormal -        [x] No Hallucinations    Other pertinent observable physical exam findings:-             --MARIA DE JESUS Cortez - CNP

## 2025-06-26 ENCOUNTER — HOSPITAL ENCOUNTER (OUTPATIENT)
Age: 70
Setting detail: SPECIMEN
Discharge: HOME | End: 2025-06-26

## 2025-06-26 DIAGNOSIS — R19.7 DIARRHEA OF PRESUMED INFECTIOUS ORIGIN: ICD-10-CM

## 2025-06-27 LAB
C DIFF GDH + TOXINS A+B STL QL IA.RAPID: NEGATIVE
SPECIMEN DESCRIPTION: NORMAL

## 2025-06-28 ENCOUNTER — RESULTS FOLLOW-UP (OUTPATIENT)
Dept: FAMILY MEDICINE CLINIC | Age: 70
End: 2025-06-28

## 2025-07-03 NOTE — TELEPHONE ENCOUNTER
Pt was informed that she needs to stop the Diflucan and start the metrogel that was sent to her pharm by Dr. Yashira Roman. Pt verbalized her understanding, and is aware that we have not gotten her urine culture back. I will call her when we do. SSKI Counseling:  I discussed with the patient the risks of SSKI including but not limited to thyroid abnormalities, metallic taste, GI upset, fever, headache, acne, arthralgias, paraesthesias, lymphadenopathy, easy bleeding, arrhythmias, and allergic reaction.

## 2025-08-17 ASSESSMENT — LIFESTYLE VARIABLES
HOW OFTEN DO YOU HAVE A DRINK CONTAINING ALCOHOL: NEVER
HOW OFTEN DO YOU HAVE A DRINK CONTAINING ALCOHOL: 1
HOW MANY STANDARD DRINKS CONTAINING ALCOHOL DO YOU HAVE ON A TYPICAL DAY: PATIENT DOES NOT DRINK
HOW OFTEN DO YOU HAVE SIX OR MORE DRINKS ON ONE OCCASION: 1
HOW MANY STANDARD DRINKS CONTAINING ALCOHOL DO YOU HAVE ON A TYPICAL DAY: 0

## 2025-08-17 ASSESSMENT — PATIENT HEALTH QUESTIONNAIRE - PHQ9
SUM OF ALL RESPONSES TO PHQ QUESTIONS 1-9: 0
1. LITTLE INTEREST OR PLEASURE IN DOING THINGS: NOT AT ALL
SUM OF ALL RESPONSES TO PHQ QUESTIONS 1-9: 0
SUM OF ALL RESPONSES TO PHQ QUESTIONS 1-9: 0
2. FEELING DOWN, DEPRESSED OR HOPELESS: NOT AT ALL
SUM OF ALL RESPONSES TO PHQ QUESTIONS 1-9: 0

## 2025-08-19 ENCOUNTER — OFFICE VISIT (OUTPATIENT)
Dept: FAMILY MEDICINE CLINIC | Age: 70
End: 2025-08-19
Payer: MEDICARE

## 2025-08-19 VITALS
OXYGEN SATURATION: 98 % | HEIGHT: 66 IN | WEIGHT: 174 LBS | SYSTOLIC BLOOD PRESSURE: 136 MMHG | HEART RATE: 92 BPM | DIASTOLIC BLOOD PRESSURE: 78 MMHG | TEMPERATURE: 98.8 F | BODY MASS INDEX: 27.97 KG/M2

## 2025-08-19 DIAGNOSIS — Z00.00 INITIAL MEDICARE ANNUAL WELLNESS VISIT: Primary | ICD-10-CM

## 2025-08-19 DIAGNOSIS — F51.01 PRIMARY INSOMNIA: ICD-10-CM

## 2025-08-19 DIAGNOSIS — F41.9 ANXIETY: ICD-10-CM

## 2025-08-19 DIAGNOSIS — Z02.83 ENCOUNTER FOR DRUG SCREENING: ICD-10-CM

## 2025-08-19 DIAGNOSIS — B37.2 SKIN YEAST INFECTION: ICD-10-CM

## 2025-08-19 PROBLEM — J30.2 SEASONAL ALLERGIES: Status: ACTIVE | Noted: 2025-08-19

## 2025-08-19 PROBLEM — Z86.19 HISTORY OF INFECTIOUS DISEASE: Status: ACTIVE | Noted: 2025-08-19

## 2025-08-19 PROBLEM — K14.6 GLOSSOPYROSIS: Status: ACTIVE | Noted: 2025-08-19

## 2025-08-19 PROBLEM — R10.9 FLANK PAIN: Status: ACTIVE | Noted: 2024-09-14

## 2025-08-19 PROBLEM — R03.0 ELEVATED BLOOD PRESSURE READING WITHOUT DIAGNOSIS OF HYPERTENSION: Status: ACTIVE | Noted: 2025-08-19

## 2025-08-19 PROCEDURE — 3017F COLORECTAL CA SCREEN DOC REV: CPT | Performed by: REGISTERED NURSE

## 2025-08-19 PROCEDURE — 1036F TOBACCO NON-USER: CPT | Performed by: REGISTERED NURSE

## 2025-08-19 PROCEDURE — 1123F ACP DISCUSS/DSCN MKR DOCD: CPT | Performed by: REGISTERED NURSE

## 2025-08-19 PROCEDURE — G8400 PT W/DXA NO RESULTS DOC: HCPCS | Performed by: REGISTERED NURSE

## 2025-08-19 PROCEDURE — 99214 OFFICE O/P EST MOD 30 MIN: CPT | Performed by: REGISTERED NURSE

## 2025-08-19 PROCEDURE — G0438 PPPS, INITIAL VISIT: HCPCS | Performed by: REGISTERED NURSE

## 2025-08-19 PROCEDURE — G8427 DOCREV CUR MEDS BY ELIG CLIN: HCPCS | Performed by: REGISTERED NURSE

## 2025-08-19 PROCEDURE — 1159F MED LIST DOCD IN RCRD: CPT | Performed by: REGISTERED NURSE

## 2025-08-19 PROCEDURE — 1090F PRES/ABSN URINE INCON ASSESS: CPT | Performed by: REGISTERED NURSE

## 2025-08-19 PROCEDURE — G8417 CALC BMI ABV UP PARAM F/U: HCPCS | Performed by: REGISTERED NURSE

## 2025-08-19 PROCEDURE — 1160F RVW MEDS BY RX/DR IN RCRD: CPT | Performed by: REGISTERED NURSE

## 2025-08-19 RX ORDER — NYSTATIN 100000 [USP'U]/G
POWDER TOPICAL
Qty: 30 G | Refills: 3 | Status: SHIPPED | OUTPATIENT
Start: 2025-08-19

## 2025-08-19 RX ORDER — TEMAZEPAM 15 MG/1
15 CAPSULE ORAL NIGHTLY PRN
Qty: 30 CAPSULE | Refills: 0 | Status: SHIPPED | OUTPATIENT
Start: 2025-08-19 | End: 2026-08-19

## 2025-08-19 ASSESSMENT — ENCOUNTER SYMPTOMS
SINUS PRESSURE: 0
SHORTNESS OF BREATH: 0
ABDOMINAL PAIN: 0
SORE THROAT: 0
COUGH: 0
DIARRHEA: 0
NAUSEA: 0
VOMITING: 0
CONSTIPATION: 0

## 2025-08-29 DIAGNOSIS — F41.9 ANXIETY: ICD-10-CM

## 2025-09-01 RX ORDER — BUSPIRONE HYDROCHLORIDE 5 MG/1
TABLET ORAL
Qty: 180 TABLET | Refills: 1 | Status: SHIPPED | OUTPATIENT
Start: 2025-09-01